# Patient Record
Sex: MALE | Race: WHITE | NOT HISPANIC OR LATINO | Employment: FULL TIME | URBAN - METROPOLITAN AREA
[De-identification: names, ages, dates, MRNs, and addresses within clinical notes are randomized per-mention and may not be internally consistent; named-entity substitution may affect disease eponyms.]

---

## 2018-04-10 ENCOUNTER — OFFICE VISIT (OUTPATIENT)
Dept: FAMILY MEDICINE CLINIC | Facility: CLINIC | Age: 28
End: 2018-04-10
Payer: COMMERCIAL

## 2018-04-10 VITALS
BODY MASS INDEX: 42.66 KG/M2 | WEIGHT: 315 LBS | TEMPERATURE: 98.4 F | HEART RATE: 72 BPM | HEIGHT: 72 IN | SYSTOLIC BLOOD PRESSURE: 112 MMHG | DIASTOLIC BLOOD PRESSURE: 50 MMHG

## 2018-04-10 DIAGNOSIS — M54.42 ACUTE BILATERAL LOW BACK PAIN WITH BILATERAL SCIATICA: Primary | ICD-10-CM

## 2018-04-10 DIAGNOSIS — M54.41 ACUTE BILATERAL LOW BACK PAIN WITH BILATERAL SCIATICA: Primary | ICD-10-CM

## 2018-04-10 PROCEDURE — 99202 OFFICE O/P NEW SF 15 MIN: CPT | Performed by: FAMILY MEDICINE

## 2018-04-10 PROCEDURE — 3008F BODY MASS INDEX DOCD: CPT | Performed by: FAMILY MEDICINE

## 2018-04-10 RX ORDER — NAPROXEN SODIUM 220 MG
440 TABLET ORAL 2 TIMES DAILY WITH MEALS
Qty: 120 TABLET | Refills: 0
Start: 2018-04-10 | End: 2020-09-02 | Stop reason: ALTCHOICE

## 2018-04-10 NOTE — PATIENT INSTRUCTIONS
Problem List Items Addressed This Visit     None      Visit Diagnoses     Acute bilateral low back pain with bilateral sciatica    -  Primary    Aleve 2 twice a day, ice, heat,  range-of-motion exercises      Relevant Medications    naproxen sodium (ALEVE) 220 MG tablet

## 2018-04-10 NOTE — PROGRESS NOTES
Assessment/Plan:    No problem-specific Assessment & Plan notes found for this encounter  Diagnoses and all orders for this visit:    Acute bilateral low back pain with bilateral sciatica  Comments:  Aleve 2 twice a day, ice, heat,  range-of-motion exercises  Orders:  -     naproxen sodium (ALEVE) 220 MG tablet; Take 2 tablets (440 mg total) by mouth 2 (two) times a day with meals for 30 days          Subjective:      Patient ID: Therese Aguilar is a 24 y o  male  CC:  Low back pain, onset over the past weekend  Denies radiation at present but states he did have some tingling sensation in (r) arm and both legs yesterday  Used Advil and Lidocaine cream for short, temporary relief  Patient had significant onset of back pain  He was trying to rest through this, use some Advil  That gave him minimal amount of relief  Overall, however, it continues to be a problem  Patient is moving in the near future, so he was packing boxes, as well as moving furniture around  After that, he did happen to sleep on the couch as well  Denies any specific trauma or injury that he can recall  He has been doing stretching for this since the beginning of the pain  Patient was having significant amount of radiating pain from the back into the hips  This was after he had been sitting for a little bit  When he went to walk around he was noticing that increased amount of pain  The following portions of the patient's history were reviewed and updated as appropriate: allergies, current medications, past family history, past medical history, past social history, past surgical history and problem list     Review of Systems   Constitutional: Negative  HENT: Negative  Eyes: Negative  Respiratory: Negative  Cardiovascular: Negative  Gastrointestinal: Negative  Endocrine: Negative  Genitourinary: Negative  Musculoskeletal:        Per HPI   Skin: Negative  Allergic/Immunologic: Negative  Neurological: Negative  Hematological: Negative  Psychiatric/Behavioral: Negative  Objective:  Vitals:    04/10/18 0850   BP: 112/50   BP Location: Left arm   Patient Position: Sitting   Cuff Size: Large   Pulse: 72   Temp: 98 4 °F (36 9 °C)   TempSrc: Tympanic   Weight: (!) 151 kg (333 lb 12 8 oz)   Height: 6' (1 829 m)       /50 (BP Location: Left arm, Patient Position: Sitting, Cuff Size: Large)   Pulse 72   Temp 98 4 °F (36 9 °C) (Tympanic)   Ht 6' (1 829 m)   Wt (!) 151 kg (333 lb 12 8 oz)   BMI 45 27 kg/m²          Physical Exam   Constitutional: He appears well-developed and well-nourished  HENT:   Head: Normocephalic and atraumatic  Neck: Normal range of motion  Neck supple  Cardiovascular: Normal rate, regular rhythm, normal heart sounds and intact distal pulses  Exam reveals no gallop and no friction rub  No murmur heard  Pulmonary/Chest: Effort normal and breath sounds normal  He has no wheezes  He has no rales  Musculoskeletal:        Lumbar back: He exhibits decreased range of motion (Decreased flexion, extension is normal, side bending normal )  He exhibits no tenderness, no bony tenderness, no swelling, no edema and no deformity

## 2019-04-02 ENCOUNTER — OFFICE VISIT (OUTPATIENT)
Dept: URGENT CARE | Age: 29
End: 2019-04-02
Payer: COMMERCIAL

## 2019-04-02 VITALS
TEMPERATURE: 98.1 F | RESPIRATION RATE: 17 BRPM | BODY MASS INDEX: 41.99 KG/M2 | WEIGHT: 310 LBS | HEIGHT: 72 IN | HEART RATE: 74 BPM | SYSTOLIC BLOOD PRESSURE: 140 MMHG | DIASTOLIC BLOOD PRESSURE: 65 MMHG | OXYGEN SATURATION: 98 %

## 2019-04-02 DIAGNOSIS — T78.40XA ALLERGIC REACTION, INITIAL ENCOUNTER: Primary | ICD-10-CM

## 2019-04-02 PROCEDURE — 99213 OFFICE O/P EST LOW 20 MIN: CPT | Performed by: FAMILY MEDICINE

## 2019-04-02 RX ORDER — METHYLPREDNISOLONE 4 MG/1
TABLET ORAL
Qty: 21 TABLET | Refills: 0 | Status: SHIPPED | OUTPATIENT
Start: 2019-04-02 | End: 2020-09-02 | Stop reason: ALTCHOICE

## 2020-09-02 ENCOUNTER — OFFICE VISIT (OUTPATIENT)
Dept: INTERNAL MEDICINE CLINIC | Age: 30
End: 2020-09-02
Payer: COMMERCIAL

## 2020-09-02 VITALS
HEIGHT: 71 IN | BODY MASS INDEX: 44.1 KG/M2 | WEIGHT: 315 LBS | SYSTOLIC BLOOD PRESSURE: 114 MMHG | DIASTOLIC BLOOD PRESSURE: 60 MMHG | HEART RATE: 63 BPM | OXYGEN SATURATION: 97 % | TEMPERATURE: 98.3 F

## 2020-09-02 DIAGNOSIS — E66.01 MORBID OBESITY (HCC): ICD-10-CM

## 2020-09-02 DIAGNOSIS — G89.29 CHRONIC MIDLINE LOW BACK PAIN WITH BILATERAL SCIATICA: ICD-10-CM

## 2020-09-02 DIAGNOSIS — M54.42 CHRONIC MIDLINE LOW BACK PAIN WITH BILATERAL SCIATICA: ICD-10-CM

## 2020-09-02 DIAGNOSIS — F41.1 GENERALIZED ANXIETY DISORDER: ICD-10-CM

## 2020-09-02 DIAGNOSIS — F17.290 OTHER TOBACCO PRODUCT NICOTINE DEPENDENCE, UNCOMPLICATED: ICD-10-CM

## 2020-09-02 DIAGNOSIS — Z76.89 ENCOUNTER TO ESTABLISH CARE: Primary | ICD-10-CM

## 2020-09-02 DIAGNOSIS — M54.41 CHRONIC MIDLINE LOW BACK PAIN WITH BILATERAL SCIATICA: ICD-10-CM

## 2020-09-02 DIAGNOSIS — Z00.00 ANNUAL PHYSICAL EXAM: ICD-10-CM

## 2020-09-02 DIAGNOSIS — F41.0 PANIC ATTACKS: ICD-10-CM

## 2020-09-02 DIAGNOSIS — Z82.69 FAMILY HISTORY OF ANKYLOSING SPONDYLITIS: ICD-10-CM

## 2020-09-02 DIAGNOSIS — M62.830 MUSCLE SPASM OF BACK: ICD-10-CM

## 2020-09-02 PROBLEM — F17.200 NICOTINE DEPENDENCE: Status: ACTIVE | Noted: 2020-09-02

## 2020-09-02 PROBLEM — Z82.49 FAMILY HISTORY OF PREMATURE CORONARY ARTERY DISEASE: Status: ACTIVE | Noted: 2020-09-02

## 2020-09-02 PROCEDURE — 3725F SCREEN DEPRESSION PERFORMED: CPT | Performed by: INTERNAL MEDICINE

## 2020-09-02 PROCEDURE — 99385 PREV VISIT NEW AGE 18-39: CPT | Performed by: INTERNAL MEDICINE

## 2020-09-02 PROCEDURE — 99406 BEHAV CHNG SMOKING 3-10 MIN: CPT | Performed by: INTERNAL MEDICINE

## 2020-09-02 PROCEDURE — 99203 OFFICE O/P NEW LOW 30 MIN: CPT | Performed by: INTERNAL MEDICINE

## 2020-09-02 RX ORDER — CYCLOBENZAPRINE HCL 5 MG
5 TABLET ORAL
Qty: 20 TABLET | Refills: 0 | Status: SHIPPED | OUTPATIENT
Start: 2020-09-02 | End: 2020-09-23 | Stop reason: SDUPTHER

## 2020-09-02 RX ORDER — NAPROXEN 500 MG/1
500 TABLET ORAL 2 TIMES DAILY WITH MEALS
Qty: 30 TABLET | Refills: 1 | Status: SHIPPED | OUTPATIENT
Start: 2020-09-02 | End: 2020-09-23 | Stop reason: SDUPTHER
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
## 2020-09-02 NOTE — PATIENT INSTRUCTIONS
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
## 2020-09-05 ENCOUNTER — APPOINTMENT (OUTPATIENT)
Dept: RADIOLOGY | Age: 30
End: 2020-09-05
Payer: COMMERCIAL

## 2020-09-05 DIAGNOSIS — G89.29 CHRONIC MIDLINE LOW BACK PAIN WITH BILATERAL SCIATICA: ICD-10-CM

## 2020-09-05 DIAGNOSIS — Z82.69 FAMILY HISTORY OF ANKYLOSING SPONDYLITIS: ICD-10-CM

## 2020-09-05 DIAGNOSIS — M54.41 CHRONIC MIDLINE LOW BACK PAIN WITH BILATERAL SCIATICA: ICD-10-CM

## 2020-09-05 DIAGNOSIS — M54.42 CHRONIC MIDLINE LOW BACK PAIN WITH BILATERAL SCIATICA: ICD-10-CM

## 2020-09-05 PROCEDURE — 72110 X-RAY EXAM L-2 SPINE 4/>VWS: CPT

## 2020-09-05 PROCEDURE — 72220 X-RAY EXAM SACRUM TAILBONE: CPT

## 2020-09-16 ENCOUNTER — APPOINTMENT (OUTPATIENT)
Dept: LAB | Age: 30
End: 2020-09-16
Payer: COMMERCIAL

## 2020-09-16 DIAGNOSIS — M54.42 CHRONIC MIDLINE LOW BACK PAIN WITH BILATERAL SCIATICA: ICD-10-CM

## 2020-09-16 DIAGNOSIS — G89.29 CHRONIC MIDLINE LOW BACK PAIN WITH BILATERAL SCIATICA: ICD-10-CM

## 2020-09-16 DIAGNOSIS — Z82.69 FAMILY HISTORY OF ANKYLOSING SPONDYLITIS: ICD-10-CM

## 2020-09-16 DIAGNOSIS — Z00.00 ANNUAL PHYSICAL EXAM: ICD-10-CM

## 2020-09-16 DIAGNOSIS — M54.41 CHRONIC MIDLINE LOW BACK PAIN WITH BILATERAL SCIATICA: ICD-10-CM

## 2020-09-16 LAB
ALBUMIN SERPL BCP-MCNC: 4.3 G/DL (ref 3.5–5)
ALP SERPL-CCNC: 57 U/L (ref 46–116)
ALT SERPL W P-5'-P-CCNC: 38 U/L (ref 12–78)
ANION GAP SERPL CALCULATED.3IONS-SCNC: 6 MMOL/L (ref 4–13)
AST SERPL W P-5'-P-CCNC: 23 U/L (ref 5–45)
BASOPHILS # BLD AUTO: 0.02 THOUSANDS/ΜL (ref 0–0.1)
BASOPHILS NFR BLD AUTO: 0 % (ref 0–1)
BILIRUB SERPL-MCNC: 0.95 MG/DL (ref 0.2–1)
BUN SERPL-MCNC: 18 MG/DL (ref 5–25)
CALCIUM SERPL-MCNC: 9 MG/DL (ref 8.3–10.1)
CHLORIDE SERPL-SCNC: 108 MMOL/L (ref 100–108)
CHOLEST SERPL-MCNC: 137 MG/DL (ref 50–200)
CO2 SERPL-SCNC: 26 MMOL/L (ref 21–32)
CREAT SERPL-MCNC: 0.82 MG/DL (ref 0.6–1.3)
EOSINOPHIL # BLD AUTO: 0.09 THOUSAND/ΜL (ref 0–0.61)
EOSINOPHIL NFR BLD AUTO: 1 % (ref 0–6)
ERYTHROCYTE [DISTWIDTH] IN BLOOD BY AUTOMATED COUNT: 12.5 % (ref 11.6–15.1)
GFR SERPL CREATININE-BSD FRML MDRD: 121 ML/MIN/1.73SQ M
GLUCOSE P FAST SERPL-MCNC: 95 MG/DL (ref 65–99)
HCT VFR BLD AUTO: 45.8 % (ref 36.5–49.3)
HDLC SERPL-MCNC: 29 MG/DL
HGB BLD-MCNC: 15.3 G/DL (ref 12–17)
IMM GRANULOCYTES # BLD AUTO: 0.02 THOUSAND/UL (ref 0–0.2)
IMM GRANULOCYTES NFR BLD AUTO: 0 % (ref 0–2)
LDLC SERPL CALC-MCNC: 72 MG/DL (ref 0–100)
LYMPHOCYTES # BLD AUTO: 2.21 THOUSANDS/ΜL (ref 0.6–4.47)
LYMPHOCYTES NFR BLD AUTO: 25 % (ref 14–44)
MCH RBC QN AUTO: 30.1 PG (ref 26.8–34.3)
MCHC RBC AUTO-ENTMCNC: 33.4 G/DL (ref 31.4–37.4)
MCV RBC AUTO: 90 FL (ref 82–98)
MONOCYTES # BLD AUTO: 0.7 THOUSAND/ΜL (ref 0.17–1.22)
MONOCYTES NFR BLD AUTO: 8 % (ref 4–12)
NEUTROPHILS # BLD AUTO: 5.7 THOUSANDS/ΜL (ref 1.85–7.62)
NEUTS SEG NFR BLD AUTO: 66 % (ref 43–75)
NONHDLC SERPL-MCNC: 108 MG/DL
NRBC BLD AUTO-RTO: 0 /100 WBCS
PLATELET # BLD AUTO: 248 THOUSANDS/UL (ref 149–390)
PMV BLD AUTO: 11.4 FL (ref 8.9–12.7)
POTASSIUM SERPL-SCNC: 4.2 MMOL/L (ref 3.5–5.3)
PROT SERPL-MCNC: 7.3 G/DL (ref 6.4–8.2)
RBC # BLD AUTO: 5.08 MILLION/UL (ref 3.88–5.62)
SODIUM SERPL-SCNC: 140 MMOL/L (ref 136–145)
TRIGL SERPL-MCNC: 181 MG/DL
TSH SERPL DL<=0.05 MIU/L-ACNC: 1.42 UIU/ML (ref 0.36–3.74)
WBC # BLD AUTO: 8.74 THOUSAND/UL (ref 4.31–10.16)

## 2020-09-16 PROCEDURE — 81374 HLA I TYPING 1 ANTIGEN LR: CPT

## 2020-09-16 PROCEDURE — 36415 COLL VENOUS BLD VENIPUNCTURE: CPT

## 2020-09-16 PROCEDURE — 84443 ASSAY THYROID STIM HORMONE: CPT

## 2020-09-16 PROCEDURE — 85025 COMPLETE CBC W/AUTO DIFF WBC: CPT

## 2020-09-16 PROCEDURE — 80053 COMPREHEN METABOLIC PANEL: CPT

## 2020-09-16 PROCEDURE — 80061 LIPID PANEL: CPT

## 2020-09-22 LAB — HLA-B27 QL NAA+PROBE: POSITIVE

## 2020-09-23 ENCOUNTER — OFFICE VISIT (OUTPATIENT)
Dept: INTERNAL MEDICINE CLINIC | Age: 30
End: 2020-09-23
Payer: COMMERCIAL

## 2020-09-23 VITALS
DIASTOLIC BLOOD PRESSURE: 70 MMHG | BODY MASS INDEX: 43.2 KG/M2 | OXYGEN SATURATION: 98 % | HEIGHT: 71 IN | SYSTOLIC BLOOD PRESSURE: 118 MMHG | HEART RATE: 74 BPM | TEMPERATURE: 98.5 F | WEIGHT: 308.6 LBS

## 2020-09-23 DIAGNOSIS — F41.1 GENERALIZED ANXIETY DISORDER: ICD-10-CM

## 2020-09-23 DIAGNOSIS — F17.290 OTHER TOBACCO PRODUCT NICOTINE DEPENDENCE, UNCOMPLICATED: ICD-10-CM

## 2020-09-23 DIAGNOSIS — Z23 NEED FOR IMMUNIZATION AGAINST INFLUENZA: ICD-10-CM

## 2020-09-23 DIAGNOSIS — M45.9 ANKYLOSING SPONDYLITIS, UNSPECIFIED SITE OF SPINE (HCC): Primary | ICD-10-CM

## 2020-09-23 DIAGNOSIS — E78.5 DYSLIPIDEMIA: ICD-10-CM

## 2020-09-23 DIAGNOSIS — M54.42 CHRONIC MIDLINE LOW BACK PAIN WITH BILATERAL SCIATICA: ICD-10-CM

## 2020-09-23 DIAGNOSIS — G89.29 CHRONIC MIDLINE LOW BACK PAIN WITH BILATERAL SCIATICA: ICD-10-CM

## 2020-09-23 DIAGNOSIS — M62.830 MUSCLE SPASM OF BACK: ICD-10-CM

## 2020-09-23 DIAGNOSIS — M54.41 CHRONIC MIDLINE LOW BACK PAIN WITH BILATERAL SCIATICA: ICD-10-CM

## 2020-09-23 PROBLEM — Z76.89 ENCOUNTER TO ESTABLISH CARE: Status: RESOLVED | Noted: 2020-09-02 | Resolved: 2020-09-23

## 2020-09-23 PROBLEM — Z00.00 ANNUAL PHYSICAL EXAM: Status: RESOLVED | Noted: 2020-09-02 | Resolved: 2020-09-23

## 2020-09-23 PROCEDURE — 4004F PT TOBACCO SCREEN RCVD TLK: CPT | Performed by: INTERNAL MEDICINE

## 2020-09-23 PROCEDURE — 99214 OFFICE O/P EST MOD 30 MIN: CPT | Performed by: INTERNAL MEDICINE

## 2020-09-23 PROCEDURE — 90686 IIV4 VACC NO PRSV 0.5 ML IM: CPT

## 2020-09-23 PROCEDURE — 90471 IMMUNIZATION ADMIN: CPT

## 2020-09-23 RX ORDER — NAPROXEN 500 MG/1
500 TABLET ORAL 2 TIMES DAILY WITH MEALS
Qty: 60 TABLET | Refills: 1 | Status: SHIPPED | OUTPATIENT
Start: 2020-09-23 | End: 2020-11-23 | Stop reason: SDUPTHER

## 2020-09-23 RX ORDER — CYCLOBENZAPRINE HCL 5 MG
5 TABLET ORAL 3 TIMES DAILY
Qty: 90 TABLET | Refills: 1 | Status: SHIPPED | OUTPATIENT
Start: 2020-09-23 | End: 2020-11-23 | Stop reason: SDUPTHER

## 2020-09-23 NOTE — PROGRESS NOTES
Assessment/Plan:    Ankylosing spondylitis, none radiographic  -patient has positive HLA B27 antigen, positive family history for ankylosing spondylitis, good response to NSAIDs, asymmetric arthritis and chronic low back pain  -x-ray does not show sacroiliitis  -we will order C-reactive protein and ESR  -we will refer him to Rheumatology    Chronic low back pain  -improved on naproxen and Flexeril with positive gelling phenomenon  -will refill naproxen and Flexeril  -patient has been counseled to take the Flexeril 3 times a day since it does not cause him to have dizziness  He was counseled to start at 5 mg 3 times a day and then increase to 10 mg 3 times a day as tolerated  Nicotine dependence  -I have counseled patient on the importance of smoking cessation  -he said that he has tried to cut down but that it will be difficult to quit because his job entails him trying out cigars  Dyslipidemia  -recent labs done on September 16th, 2020 show low HDL of 29 and elevated triglycerides at 181 with normal total cholesterol of 137 and normal LDL of 72  -patient has been counseled to cut down on his carbohydrates and fats and to avoid eating late at night  -he was counseled to increase his exercise regimen    Generalized anxiety disorder  -patient is still not interested in pharmacotherapy at this time  -our office will try and call the psychology office to get him an earlier appointment  -he was counseled to call the office if his symptoms become overwhelming  -follow-up in 4 months or sooner as needed    All lab results were reviewed with patient  Patient wanted advice on the possibility of using medical marijuana for his pain  I explained to him that with regards to medical marijuana, there have not been enough studies to delineate all the long-term side effects especially in a young person    I acknowledged that medical marijuana does help with pain but we do not know much about interactions with other medications and we do not know about save doses also  However, if he wants a referral to a palliative care specialist or someone else with a license to prescribe marijuana, I will give him the referral but he declined at this time  He wants to think more about it  Diagnoses and all orders for this visit:    Ankylosing spondylitis, unspecified site of spine [facility])  -     Ambulatory referral to Rheumatology; Future  -     Sedimentation rate, automated; Future  -     C-reactive protein; Future    Other tobacco product nicotine dependence, uncomplicated    Dyslipidemia    Generalized anxiety disorder    Chronic midline low back pain with bilateral sciatica  -     cyclobenzaprine (FLEXERIL) 5 mg tablet; Take 1 tablet (5 mg total) by mouth 3 (three) times a day  -     naproxen (NAPROSYN) 500 mg tablet; Take 1 tablet (500 mg total) by mouth 2 (two) times a day with meals    Muscle spasm of back  -     cyclobenzaprine (FLEXERIL) 5 mg tablet; Take 1 tablet (5 mg total) by mouth 3 (three) times a day          Subjective:      Patient ID: Vikki Almanza is a 34 y o  male  HPI  Patient presents for follow-up visit regarding his chronic low back pain  He was started on naproxen and Flexeril at his last visit and states that the low back pain is well controlled but he now has thoracic back pain radiating to the back of his neck with muscle spasms  He states that his symptoms are much improved when he wakes up in the morning and of note he was taking the Flexeril at nighttime  He denies any dizziness with the Flexeril, even with 10 mg  He states that his symptoms are much worse when he sits at work for about 9 hours and gets up  He denies any more sciatica and denies heel pain  He denies any eye pain or change in vision but states that he recently saw his ophthalmologist who told him that he his astigmatism shifted a little bit  He still smokes cigars but states that he has cut down since his last visit    He currently smokes twice a week  He states that it is difficult for him to quit because he works in a [employer]  He admits that he eats late at night and does not exercise much but states that he has started exercising more and admits that he feels much better when he exercises  He has lost a few lb since the last visit  With regards to his anxiety, he states that he has not been able to get an appointment with Psychology at all  He has been calling for 2 weeks and nobody has returned his call  He admits to anxiety but denies panic attacks, feelings of depression, fever, chills, night sweats, chest pain, shortness of breath, palpitations, cough, nausea, vomiting abdominal pain, diarrhea, constipation, dark tarry stools, heartburn, rectal bleeding, dysuria, urinary frequency  The following portions of the patient's history were reviewed and updated as appropriate:   He  has a past medical history of Acute torn meniscus of knee, unspecified laterality, initial encounter and Panic attack  He   Patient Active Problem List    Diagnosis Date Noted    Ankylosing spondylitis ([place] [address] ) 09/23/2020    Dyslipidemia 09/23/2020    Morbid obesity ([place] [address] ) 09/02/2020    Family history of ankylosing spondylitis 09/02/2020    Chronic low back pain with bilateral sciatica 09/02/2020    Generalized anxiety disorder 09/02/2020    Panic attacks 09/02/2020    Nicotine dependence-cigars 09/02/2020    Family history of premature coronary artery disease 09/02/2020     He  has a past surgical history that includes Adenoidectomy  His family history includes Alcohol abuse in his father; Ankylosing spondylitis in his father;  Anxiety disorder in his mother; Bipolar disorder in his father; Cancer in his paternal grandfather; Coronary artery disease in his maternal grandfather and maternal grandmother; Depression in his father, mother, and sister; Diabetes in his maternal grandmother; Drug abuse in his father; Heart attack in his maternal grandfather and maternal grandmother; Hypertension in his maternal grandfather and maternal grandmother; Substance Abuse in his father and mother  He  reports that he has been smoking cigars  He has smoked for the past 7 00 years  He has never used smokeless tobacco  He reports current alcohol use  He reports current drug use  Drug: Marijuana  Current Outpatient Medications   Medication Sig Dispense Refill    cyclobenzaprine (FLEXERIL) 5 mg tablet Take 1 tablet (5 mg total) by mouth 3 (three) times a day 90 tablet 1    naproxen (NAPROSYN) 500 mg tablet Take 1 tablet (500 mg total) by mouth 2 (two) times a day with meals 60 tablet 1     No current facility-administered medications for this visit  Current Outpatient Medications on File Prior to Visit   Medication Sig    [DISCONTINUED] cyclobenzaprine (FLEXERIL) 5 mg tablet Take 1 tablet (5 mg total) by mouth daily at bedtime    [DISCONTINUED] naproxen (NAPROSYN) 500 mg tablet Take 1 tablet (500 mg total) by mouth 2 (two) times a day with meals     No current facility-administered medications on file prior to visit  He is allergic to codeine and penicillins       Review of Systems   Constitutional: Negative for activity change, chills, fatigue, fever and unexpected weight change  HENT: Negative for ear pain, postnasal drip, rhinorrhea, sinus pressure and sore throat  Eyes: Negative for pain and visual disturbance  Respiratory: Negative for cough, choking, chest tightness, shortness of breath and wheezing  Cardiovascular: Negative for chest pain, palpitations and leg swelling  Gastrointestinal: Negative for abdominal pain, constipation, diarrhea, nausea and vomiting  Genitourinary: Negative for dysuria and hematuria  Musculoskeletal: Positive for back pain (better when he wakes up in the morning - he takes the flexeril at night time ), neck pain and neck stiffness   Negative for arthralgias, gait problem, joint swelling and myalgias  Skin: Negative for pallor and rash  Neurological: Negative for dizziness, tremors, seizures, syncope, light-headedness and headaches  Hematological: Negative for adenopathy  Psychiatric/Behavioral: Negative for behavioral problems  The patient is nervous/anxious  Objective:      /70 (BP Location: Left arm, Patient Position: Sitting, Cuff Size: Standard)   Pulse 74   Temp 98 5 °F (36 9 °C) (Temporal)   Ht 5' 11 46" (1 815 m)   Wt (!) 140 kg (308 lb 9 6 oz)   SpO2 98%   BMI 42 49 kg/m²          Physical Exam  Constitutional:       General: He is not in acute distress  Appearance: He is well-developed  He is not diaphoretic  HENT:      Head: Normocephalic and atraumatic  Right Ear: External ear normal       Left Ear: External ear normal       Nose: Nose normal       Mouth/Throat:      Mouth: Mucous membranes are dry  Pharynx: No oropharyngeal exudate  Comments: Dry mucous membranes  Eyes:      General: No scleral icterus  Right eye: No discharge  Left eye: No discharge  Conjunctiva/sclera: Conjunctivae normal       Pupils: Pupils are equal, round, and reactive to light  Neck:      Musculoskeletal: Normal range of motion and neck supple  Muscular tenderness (Posterior neck tenderness on deep palpation with mild paraspinal muscle hypertonicity) present  Thyroid: No thyromegaly  Vascular: No JVD  Trachea: No tracheal deviation  Cardiovascular:      Rate and Rhythm: Normal rate and regular rhythm  Heart sounds: Normal heart sounds  No murmur  No friction rub  No gallop  Pulmonary:      Effort: Pulmonary effort is normal  No respiratory distress  Breath sounds: Normal breath sounds  No wheezing or rales  Chest:      Chest wall: No tenderness  Abdominal:      General: Bowel sounds are normal  There is no distension  Palpations: Abdomen is soft  There is no mass  Tenderness:  There is no abdominal tenderness  There is no guarding or rebound  Musculoskeletal: Normal range of motion  General: No tenderness or deformity  Lumbar back: He exhibits no tenderness (No tenderness and lumbar and sacral region palpation)  Lymphadenopathy:      Cervical: No cervical adenopathy  Skin:     General: Skin is warm and dry  Coloration: Skin is not pale  Findings: No erythema or rash  Neurological:      Mental Status: He is alert and oriented to person, place, and time  Cranial Nerves: No cranial nerve deficit  Motor: No abnormal muscle tone  Coordination: Coordination normal       Deep Tendon Reflexes: Reflexes are normal and symmetric  Psychiatric:         Behavior: Behavior normal            Appointment on 09/16/2020   Component Date Value Ref Range Status    HLA B27 09/16/2020 Positive   Final    HLA-B*27 Positive  This patient is positive for HLA-B*27  This procedure rules  out the B*27:06 and 27:09 alleles, which the literature  suggests are not associated with spondyloarthropathies  B27 allele interpretation for all loci based on IMGT/HLA  database version 3 38  This test was developed and its performance characteristics  determined by [facility]   It has not been cleared or approved  by the Food and Drug Administration  [facility] CLIA ID Number [id number]  This test was performed using PCR (Polymerase Chain Reaction)/SSOP  (Sequence Specific Oligonucleotide Probes) technique  SBT (Sequence  Based Typing) and/or SSP (Sequence Specific Primers) may be used as  supplemental methods when necessary  Please contact [facility] Customer  Service at [phone] if you have any questions     Director of [facility]   Dr Marito Tariq, PhD    WBC 09/16/2020 8 74  4 31 - 10 16 Thousand/uL Final    RBC 09/16/2020 5 08  3 88 - 5 62 Million/uL Final    Hemoglobin 09/16/2020 15 3  12 0 - 17 0 g/dL Final    Hematocrit 09/16/2020 45 8  36 5 - 49 3 % Final    MCV 09/16/2020 90 82 - 98 fL Final    MCH 09/16/2020 30 1  26 8 - 34 3 pg Final    MCHC 09/16/2020 33 4  31 4 - 37 4 g/dL Final    RDW 09/16/2020 12 5  11 6 - 15 1 % Final    MPV 09/16/2020 11 4  8 9 - 12 7 fL Final    Platelets 44/21/0588 248  149 - 390 Thousands/uL Final    nRBC 09/16/2020 0  /100 WBCs Final    Neutrophils Relative 09/16/2020 66  43 - 75 % Final    Immat GRANS % 09/16/2020 0  0 - 2 % Final    Lymphocytes Relative 09/16/2020 25  14 - 44 % Final    Monocytes Relative 09/16/2020 8  4 - 12 % Final    Eosinophils Relative 09/16/2020 1  0 - 6 % Final    Basophils Relative 09/16/2020 0  0 - 1 % Final    Neutrophils Absolute 09/16/2020 5 70  1 85 - 7 62 Thousands/µL Final    Immature Grans Absolute 09/16/2020 0 02  0 00 - 0 20 Thousand/uL Final    Lymphocytes Absolute 09/16/2020 2 21  0 60 - 4 47 Thousands/µL Final    Monocytes Absolute 09/16/2020 0 70  0 17 - 1 22 Thousand/µL Final    Eosinophils Absolute 09/16/2020 0 09  0 00 - 0 61 Thousand/µL Final    Basophils Absolute 09/16/2020 0 02  0 00 - 0 10 Thousands/µL Final    Sodium 09/16/2020 140  136 - 145 mmol/L Final    Potassium 09/16/2020 4 2  3 5 - 5 3 mmol/L Final    Chloride 09/16/2020 108  100 - 108 mmol/L Final    CO2 09/16/2020 26  21 - 32 mmol/L Final    ANION GAP 09/16/2020 6  4 - 13 mmol/L Final    BUN 09/16/2020 18  5 - 25 mg/dL Final    Creatinine 09/16/2020 0 82  0 60 - 1 30 mg/dL Final    Standardized to IDMS reference method    Glucose, Fasting 09/16/2020 95  65 - 99 mg/dL Final    Specimen collection should occur prior to Sulfasalazine administration due to the potential for falsely depressed results  Specimen collection should occur prior to Sulfapyridine administration due to the potential for falsely elevated results      Calcium 09/16/2020 9 0  8 3 - 10 1 mg/dL Final    AST 09/16/2020 23  5 - 45 U/L Final    Specimen collection should occur prior to Sulfasalazine administration due to the potential for falsely depressed results   ALT 09/16/2020 38  12 - 78 U/L Final    Specimen collection should occur prior to Sulfasalazine and/or Sulfapyridine administration due to the potential for falsely depressed results   Alkaline Phosphatase 09/16/2020 57  46 - 116 U/L Final    Total Protein 09/16/2020 7 3  6 4 - 8 2 g/dL Final    Albumin 09/16/2020 4 3  3 5 - 5 0 g/dL Final    Total Bilirubin 09/16/2020 0 95  0 20 - 1 00 mg/dL Final    Use of this assay is not recommended for patients undergoing treatment with eltrombopag due to the potential for falsely elevated results   eGFR 09/16/2020 121  ml/min/1 73sq m Final    Cholesterol 09/16/2020 137  50 - 200 mg/dL Final    Cholesterol:       Desirable         <200 mg/dl       Borderline         200-239 mg/dl       High              >239           Triglycerides 09/16/2020 181* <=150 mg/dL Final    Triglyceride:     Normal          <150 mg/dl     Borderline High 150-199 mg/dl     High            200-499 mg/dl        Very High       >499 mg/dl    Specimen collection should occur prior to N-Acetylcysteine or Metamizole administration due to the potential for falsely depressed results   HDL, Direct 09/16/2020 29* >=40 mg/dL Final    HDL Cholesterol:       Low     <41 mg/dL  Specimen collection should occur prior to Metamizole administration due to the potential for falsley depressed results   LDL Calculated 09/16/2020 72  0 - 100 mg/dL Final    LDL Cholesterol:     Optimal           <100 mg/dl     Near Optimal      100-129 mg/dl     Above Optimal       Borderline High 130-159 mg/dl       High            160-189 mg/dl       Very High       >189 mg/dl         This screening LDL is a calculated result  It does not have the accuracy of the Direct Measured LDL in the monitoring of patients with hyperlipidemia and/or statin therapy  Direct Measure LDL (AZW935) must be ordered separately in these patients      Non-HDL-Chol (CHOL-HDL) 09/16/2020 108  mg/dl Final    TSH 3RD FLEX 09/16/2020 1 420  0 358 - 3 740 uIU/mL Final    Using supplements with high doses of biotin 20 to more than 300 times greater than the adequate daily intake for adults of 30 mcg/day as established by the [place] [employer], can cause falsely depress results

## 2020-10-22 ENCOUNTER — TELEPHONE (OUTPATIENT)
Dept: PSYCHIATRY | Facility: CLINIC | Age: 30
End: 2020-10-22

## 2020-11-20 DIAGNOSIS — M54.42 CHRONIC MIDLINE LOW BACK PAIN WITH BILATERAL SCIATICA: ICD-10-CM

## 2020-11-20 DIAGNOSIS — M54.41 CHRONIC MIDLINE LOW BACK PAIN WITH BILATERAL SCIATICA: ICD-10-CM

## 2020-11-20 DIAGNOSIS — M62.830 MUSCLE SPASM OF BACK: ICD-10-CM

## 2020-11-20 DIAGNOSIS — G89.29 CHRONIC MIDLINE LOW BACK PAIN WITH BILATERAL SCIATICA: ICD-10-CM

## 2020-11-23 RX ORDER — NAPROXEN 500 MG/1
500 TABLET ORAL 2 TIMES DAILY WITH MEALS
Qty: 60 TABLET | Refills: 1 | Status: SHIPPED | OUTPATIENT
Start: 2020-11-23 | End: 2021-01-04 | Stop reason: SDUPTHER

## 2020-11-23 RX ORDER — CYCLOBENZAPRINE HCL 5 MG
5 TABLET ORAL 3 TIMES DAILY
Qty: 90 TABLET | Refills: 1 | Status: SHIPPED | OUTPATIENT
Start: 2020-11-23 | End: 2021-01-04 | Stop reason: SDUPTHER

## 2021-01-04 DIAGNOSIS — M54.41 CHRONIC MIDLINE LOW BACK PAIN WITH BILATERAL SCIATICA: ICD-10-CM

## 2021-01-04 DIAGNOSIS — G89.29 CHRONIC MIDLINE LOW BACK PAIN WITH BILATERAL SCIATICA: ICD-10-CM

## 2021-01-04 DIAGNOSIS — M62.830 MUSCLE SPASM OF BACK: ICD-10-CM

## 2021-01-04 DIAGNOSIS — M54.42 CHRONIC MIDLINE LOW BACK PAIN WITH BILATERAL SCIATICA: ICD-10-CM

## 2021-01-04 RX ORDER — NAPROXEN 500 MG/1
500 TABLET ORAL 2 TIMES DAILY WITH MEALS
Qty: 60 TABLET | Refills: 1 | Status: SHIPPED | OUTPATIENT
Start: 2021-01-04 | End: 2021-05-05 | Stop reason: SDUPTHER

## 2021-01-04 RX ORDER — CYCLOBENZAPRINE HCL 5 MG
5 TABLET ORAL 3 TIMES DAILY
Qty: 90 TABLET | Refills: 1 | Status: SHIPPED | OUTPATIENT
Start: 2021-01-04 | End: 2021-03-24 | Stop reason: ALTCHOICE

## 2021-01-12 ENCOUNTER — TELEPHONE (OUTPATIENT)
Dept: OBGYN CLINIC | Facility: HOSPITAL | Age: 31
End: 2021-01-12

## 2021-01-12 NOTE — TELEPHONE ENCOUNTER
Patient is calling to find out if Dr Matheus Zheng would like him to complete and labs prior to his appointment on 01-26-21  Please advise

## 2021-01-24 NOTE — PROGRESS NOTES
Assessment and Plan:   Mr Loretta Kocher is a 33-year-old male with no significant past medical history, who presents for further evaluation of chronic low back pain and a positive HLA B27 antigen  He is referred by Dr Eleanor Rodgers for a rheumatology consult  Elizabeth Few presents today for further evaluation of chronic low back pain which appears representative of inflammatory back pain and responsive to NSAID use with additional work up showing a positive HLA B27 antigen  His diagnosis is likely consistent with ankylosing spondylitis and I advised him that NSAIDs may be the first-line option for treatment so he should continue with the naproxen at 500 mg twice a day for now  He can minimize the muscle relaxant use  I would also like to obtain an MRI of his sacroiliac joints to assess for any inflammatory changes  - In view of the thoracic spinal discomfort/stiffness this may be representative of an inflammatory process as well and to further evaluate I will obtain an x-ray of his thoracic spine  Of note this region has not responded to NSAID use  - I would like to see him back in the office once the labs, x-rays and MRI are complete  At that time we will discuss if he continues with NSAID use or if we should consider stepping up to TNF inhibitor therapy  Plan:  Diagnoses and all orders for this visit:    Chronic low back pain, unspecified back pain laterality, unspecified whether sciatica present    Ankylosing spondylitis, unspecified site of spine ([address] )  -     MRI pelvis sacrum,coccyx, si jts wo contrast; Future  -     Chronic Hepatitis Panel; Future  -     C-reactive protein; Future  -     Sedimentation rate, automated; Future  -     RF Screen w/ Reflex to Titer; Future  -     Cyclic citrul peptide antibody, IgG;  Future  -     Ambulatory referral to Rheumatology  -     XR spine thoracic 3 vw; Future    HLA B27 (HLA B27 positive)  -     MRI pelvis sacrum,coccyx, si jts wo contrast; Future    Family history of ankylosing spondylitis    Screening-pulmonary TB  -     Quantiferon TB Gold Plus; Future      I have personally reviewed pertinent films in PACS of the SI joint XR which is normal        Activities as tolerated  Exercise: try to maintain a low impact exercise regimen as much as possible  Walk for 30 minutes a day for at least 3 days a week  Continue other medications as prescribed by PCP and other specialists  RTC in 8 weeks  HPI  Mr Hailey Oscar is a 82-year-old male with no significant past medical history, who presents for further evaluation of chronic low back pain and a positive HLA B27 antigen  He is referred by Dr Ciaar Brantley for a rheumatology consult  Patient reports he has experienced intermittent low back pain for a few years, but noticed progressive worsening of his symptoms in August 2020  He was seen by his primary care physician and due to a family history of ankylosing spondylitis in his father had workup done which showed the positive HLA B27 antigen  A CBC, CMP and TSH were normal   An x-ray of his lumbar spine and sacroiliac joints was essentially unrevealing except for minimal lumbar degenerative changes noted  He was started on naproxen 500 mg twice a day and Flexeril 5 mg 3 times a day in September 2020 which has significantly helped with the low back pain and he has not been experiencing it since then  He then started to experience a stiffness/tightening (as opposed to pain) in his midback region which has been constant and does not improve with the naproxen or Flexeril  He does perform daily stretching exercises which relieves his symptoms to some degree  He reports occasional pain on the outer aspect of his right hip just below his belt line  He denies any joint pains of his hands, wrists, elbows, shoulders, neck, currently of his low back/buttocks region, left hip, knees, ankles or feet  He denies swollen joints    He does experience morning stiffness which affects his entire back and takes about 1 hour to improve but states before starting the NSAIDs the morning stiffness was longer  The low back pain does not wake him up from sleep at night and he reports symptoms improving with activities and worsening with rest   Prior to starting the naproxen and Flexeril he was not taking any over-the-counter pain medications and has not been on any other NSAIDs  He denies fevers, unintentional weight loss, dry eyes, dry mouth, inflammatory eye disease, skin rash, psoriasis or inflammatory bowel disease  Other than the ankylosing spondylitis in his father he denies a family history of autoimmune conditions  The following portions of the patient's history were reviewed and updated as appropriate: allergies, current medications, past family history, past medical history, past social history, past surgical history and problem list       Review of Systems  Constitutional: Negative for weight change, fevers, chills, night sweats, fatigue  ENT/Mouth: Negative for hearing changes, ear pain, nasal congestion, sinus pain, hoarseness, sore throat, rhinorrhea, swallowing difficulty  Eyes: Negative for pain, redness, discharge, vision changes  Cardiovascular: Negative for chest pain, SOB, palpitations  Respiratory: Negative for cough, sputum, wheezing, dyspnea  Gastrointestinal: Negative for nausea, vomiting, diarrhea, constipation, pain, heartburn  Genitourinary: Negative for dysuria, urinary frequency, hematuria  Musculoskeletal: As per HPI  Skin: Negative for skin rash, color changes  Neuro: Negative for weakness, numbness, tingling, loss of consciousness  Psych: Negative for anxiety, depression  Heme/Lymph: Negative for easy bruising, bleeding, lymphadenopathy          Past Medical History:   Diagnosis Date    Acute torn meniscus of knee, unspecified laterality, initial encounter     Panic attack        Past Surgical History:   Procedure Laterality Date    ADENOIDECTOMY Social History     Socioeconomic History    Marital status: Single     Spouse name: Not on file    Number of children: Not on file    Years of education: Not on file    Highest education level: Not on file   Occupational History    Not on file   Social Needs    Financial resource strain: Not on file    Food insecurity     Worry: Not on file     Inability: Not on file    Transportation needs     Medical: Not on file     Non-medical: Not on file   Tobacco Use    Smoking status: Current Some Day Smoker     Years: 7 00     Types: Cigars    Smokeless tobacco: Never Used    Tobacco comment: smokes cigars--about 5 per week/Never smokere   Substance and Sexual Activity    Alcohol use: Yes     Frequency: 2-3 times a week     Drinks per session: 1 or 2     Binge frequency: Never     Comment: 2-3 drinks per week    Drug use: Yes     Types: Marijuana    Sexual activity: Not on file   Lifestyle    Physical activity     Days per week: Not on file     Minutes per session: Not on file    Stress: Not on file   Relationships    Social connections     Talks on phone: Not on file     Gets together: Not on file     Attends Hinduism service: Not on file     Active member of club or organization: Not on file     Attends meetings of clubs or organizations: Not on file     Relationship status: Not on file    Intimate partner violence     Fear of current or ex partner: Not on file     Emotionally abused: Not on file     Physically abused: Not on file     Forced sexual activity: Not on file   Other Topics Concern    Not on file   Social History Narrative    Not on file       Family History   Problem Relation Age of Onset    Substance Abuse Mother     Depression Mother     Anxiety disorder Mother     Substance Abuse Father     Ankylosing spondylitis Father     Bipolar disorder Father     Alcohol abuse Father     Depression Father     Drug abuse Father     Cancer Paternal Grandfather     Depression Sister  Heart attack Maternal Grandmother     Coronary artery disease Maternal Grandmother     Diabetes Maternal Grandmother     Hypertension Maternal Grandmother     Heart attack Maternal Grandfather     Coronary artery disease Maternal Grandfather     Hypertension Maternal Grandfather        Allergies   Allergen Reactions    Codeine Other (See Comments)     Unknown    Penicillins Other (See Comments)     Unknown       Current Outpatient Medications:     cyclobenzaprine (FLEXERIL) 5 mg tablet, Take 1 tablet (5 mg total) by mouth 3 (three) times a day, Disp: 90 tablet, Rfl: 1    naproxen (NAPROSYN) 500 mg tablet, Take 1 tablet (500 mg total) by mouth 2 (two) times a day with meals, Disp: 60 tablet, Rfl: 1      Objective:    Vitals:    01/26/21 0907   BP: 142/88   Pulse: 87   Weight: (!) 140 kg (308 lb)       Physical Exam  General: Well appearing, well nourished, in no distress  Oriented x 3, normal mood and affect  Ambulating without difficulty  Overweight  Skin: Good turgor, no rash, unusual bruising or prominent lesions  Hair: Normal texture and distribution  Nails: Normal color, no deformities  He does appear to have a few digital pits noted at the right hand 2nd digit and left hand 4th digit  HEENT:  Head: Normocephalic, atraumatic  Eyes: Conjunctiva clear, sclera non-icteric, EOM intact  Extremities: No amputations or deformities, cyanosis, edema  Musculoskeletal:   There is no peripheral joint soft tissue swelling or tenderness noted today  There is no enthesitis or dactylitis  There is no specific spinal tenderness or sacroiliac joint tenderness noted  Normal Schober test   Neurologic: Alert and oriented  No focal neurological deficits appreciated  Psychiatric: Normal mood and affect  NBA Massey    Rheumatology

## 2021-01-26 ENCOUNTER — CONSULT (OUTPATIENT)
Dept: RHEUMATOLOGY | Facility: CLINIC | Age: 31
End: 2021-01-26
Payer: COMMERCIAL

## 2021-01-26 VITALS
DIASTOLIC BLOOD PRESSURE: 88 MMHG | WEIGHT: 308 LBS | SYSTOLIC BLOOD PRESSURE: 142 MMHG | HEART RATE: 87 BPM | BODY MASS INDEX: 42.41 KG/M2

## 2021-01-26 DIAGNOSIS — Z82.69 FAMILY HISTORY OF ANKYLOSING SPONDYLITIS: ICD-10-CM

## 2021-01-26 DIAGNOSIS — G89.29 CHRONIC LOW BACK PAIN, UNSPECIFIED BACK PAIN LATERALITY, UNSPECIFIED WHETHER SCIATICA PRESENT: Primary | ICD-10-CM

## 2021-01-26 DIAGNOSIS — M45.9 ANKYLOSING SPONDYLITIS, UNSPECIFIED SITE OF SPINE (HCC): ICD-10-CM

## 2021-01-26 DIAGNOSIS — Z15.89 HLA B27 (HLA B27 POSITIVE): ICD-10-CM

## 2021-01-26 DIAGNOSIS — M54.50 CHRONIC LOW BACK PAIN, UNSPECIFIED BACK PAIN LATERALITY, UNSPECIFIED WHETHER SCIATICA PRESENT: Primary | ICD-10-CM

## 2021-01-26 DIAGNOSIS — Z11.1 SCREENING-PULMONARY TB: ICD-10-CM

## 2021-01-26 PROCEDURE — 4004F PT TOBACCO SCREEN RCVD TLK: CPT | Performed by: INTERNAL MEDICINE

## 2021-01-26 PROCEDURE — 99205 OFFICE O/P NEW HI 60 MIN: CPT | Performed by: INTERNAL MEDICINE

## 2021-01-27 ENCOUNTER — TELEPHONE (OUTPATIENT)
Dept: OBGYN CLINIC | Facility: HOSPITAL | Age: 31
End: 2021-01-27

## 2021-01-27 NOTE — TELEPHONE ENCOUNTER
Please keep the scheduled appointment for now and once I receive all the results we will contact him, thanks

## 2021-02-03 ENCOUNTER — TELEPHONE (OUTPATIENT)
Dept: INTERNAL MEDICINE CLINIC | Age: 31
End: 2021-02-03

## 2021-02-03 NOTE — TELEPHONE ENCOUNTER
Patient sees Dr Micheline Carrasquillo    Patient called to schedule his 8 week follow up  Patient was able to schedule his xray for this upcoming Friday, 1/29  Patient would like to know if he should be seen sooner or if the doctor would still prefer to wait the full 8 weeks  Patient is currently schedule for GARTH Reveles's soonest appointment, 5/5      Call back # [phone] [facility]: I discussed with the patient the risks of risankizumab-rzaa including but not limited to immunosuppression, and serious infections.  The patient understands that monitoring is required including a PPD at baseline and must alert us or the primary physician if symptoms of infection or other concerning signs are noted.

## 2021-02-05 ENCOUNTER — HOSPITAL ENCOUNTER (OUTPATIENT)
Dept: RADIOLOGY | Age: 31
Discharge: HOME | End: 2021-02-05
Payer: COMMERCIAL

## 2021-02-05 DIAGNOSIS — M45.9 ANKYLOSING SPONDYLITIS, UNSPECIFIED SITE OF SPINE (HCC): ICD-10-CM

## 2021-02-05 DIAGNOSIS — Z15.89 HLA B27 (HLA B27 POSITIVE): ICD-10-CM

## 2021-02-05 PROCEDURE — G1004 CDSM NDSC: HCPCS

## 2021-02-05 PROCEDURE — 72195 MRI PELVIS W/O DYE: CPT

## 2021-02-15 ENCOUNTER — APPOINTMENT (OUTPATIENT)
Dept: RADIOLOGY | Age: 31
End: 2021-02-15
Payer: COMMERCIAL

## 2021-02-15 ENCOUNTER — LAB (OUTPATIENT)
Dept: LAB | Age: 31
End: 2021-02-15
Payer: COMMERCIAL

## 2021-02-15 DIAGNOSIS — Z11.1 SCREENING-PULMONARY TB: ICD-10-CM

## 2021-02-15 DIAGNOSIS — M45.9 ANKYLOSING SPONDYLITIS, UNSPECIFIED SITE OF SPINE (HCC): ICD-10-CM

## 2021-02-15 LAB
CRP SERPL QL: <3 MG/L
ERYTHROCYTE [SEDIMENTATION RATE] IN BLOOD: 10 MM/HOUR (ref 0–14)

## 2021-02-15 PROCEDURE — 86140 C-REACTIVE PROTEIN: CPT

## 2021-02-15 PROCEDURE — 86704 HEP B CORE ANTIBODY TOTAL: CPT

## 2021-02-15 PROCEDURE — 86480 TB TEST CELL IMMUN MEASURE: CPT

## 2021-02-15 PROCEDURE — 85652 RBC SED RATE AUTOMATED: CPT

## 2021-02-15 PROCEDURE — 72072 X-RAY EXAM THORAC SPINE 3VWS: CPT

## 2021-02-15 PROCEDURE — 86803 HEPATITIS C AB TEST: CPT

## 2021-02-15 PROCEDURE — 87340 HEPATITIS B SURFACE AG IA: CPT

## 2021-02-15 PROCEDURE — 36415 COLL VENOUS BLD VENIPUNCTURE: CPT

## 2021-02-15 PROCEDURE — 86430 RHEUMATOID FACTOR TEST QUAL: CPT

## 2021-02-15 PROCEDURE — 86200 CCP ANTIBODY: CPT

## 2021-02-15 PROCEDURE — 86705 HEP B CORE ANTIBODY IGM: CPT

## 2021-02-16 LAB
HBV CORE AB SER QL: NORMAL
HBV CORE IGM SER QL: NORMAL
HBV SURFACE AG SER QL: NORMAL
HCV AB SER QL: NORMAL
RHEUMATOID FACT SER QL LA: NEGATIVE

## 2021-02-16 NOTE — PROGRESS NOTES
Assessment and Plan:   Mr Stacey Whelan is a 29-year-old male with no significant past medical history, who presents for follow up of chronic low back pain and a positive HLA B27 antigen  # Ankylosing spondylitis  - Tucker Matias presents today for follow up of inflammatory back pain with significant response to NSAIDs and a positive HLA B27 antigen which overall appears to be consistent with a diagnosis of ankylosing spondylitis  It is reassuring to note his inflammatory markers were normal and there are no inflammatory changes seen yet on the MRI of his sacroiliac joints  As the low back pain has improved with NSAID use he will continue with the naproxen 500 mg twice daily as needed, but I discussed with him the concerns related to long term NSAID use and to try and minimize the naproxen dose and use if possible  He is aware if his symptoms do not respond to NSAIDs the next step in treatment would be consideration of a TNF inhibitor  - In terms of the upper and mid back pain I suspect this is more so related to a muscular etiology and not secondary to the ankylosing spondylitis diagnosis as he is not responding to NSAIDs  I recommend a conservative approach for now with physical therapy, chiropractic manipulation or acupuncture  He would like to start off with PT at this time  If this is ineffective we can consider an alternate NSAID  Plan:  Diagnoses and all orders for this visit:    Ankylosing spondylitis, unspecified site of spine ([facility] [address] )    Chronic midline thoracic back pain  -     Ambulatory referral to Physical Therapy; Future    HLA B27 (HLA B27 positive)    NSAID long-term use      Activities as tolerated  Exercise: try to maintain a low impact exercise regimen as much as possible  Walk for 30 minutes a day for at least 3 days a week  Continue other medications as prescribed by PCP and other specialists  RTC in 6 months          HPI    INITIAL VISIT NOTE (1/2021):  Mr Stacey Whelan is a 29-year-old male with no significant past medical history, who presents for further evaluation of chronic low back pain and a positive HLA B27 antigen  He is referred by Dr Sandor David for a rheumatology consult      Patient reports he has experienced intermittent low back pain for a few years, but noticed progressive worsening of his symptoms in August 2020  He was seen by his primary care physician and due to a family history of ankylosing spondylitis in his father had workup done which showed the positive HLA B27 antigen  A CBC, CMP and TSH were normal   An x-ray of his lumbar spine and sacroiliac joints was essentially unrevealing except for minimal lumbar degenerative changes noted  He was started on naproxen 500 mg twice a day and Flexeril 5 mg 3 times a day in September 2020 which has significantly helped with the low back pain and he has not been experiencing it since then  He then started to experience a stiffness/tightening (as opposed to pain) in his midback region which has been constant and does not improve with the naproxen or Flexeril  He does perform daily stretching exercises which relieves his symptoms to some degree  He reports occasional pain on the outer aspect of his right hip just below his belt line  He denies any joint pains of his hands, wrists, elbows, shoulders, neck, currently of his low back/buttocks region, left hip, knees, ankles or feet  He denies swollen joints  He does experience morning stiffness which affects his entire back and takes about 1 hour to improve but states before starting the NSAIDs the morning stiffness was longer    The low back pain does not wake him up from sleep at night and he reports symptoms improving with activities and worsening with rest   Prior to starting the naproxen and Flexeril he was not taking any over-the-counter pain medications and has not been on any other NSAIDs      He denies fevers, unintentional weight loss, dry eyes, dry mouth, inflammatory eye disease, skin rash, psoriasis or inflammatory bowel disease  Other than the ankylosing spondylitis in his father he denies a family history of autoimmune conditions        2/17/2021:  Patient presents for a follow-up today  We reviewed the x-ray of his thoracic spine and MRI of the sacroiliac joints which were both normal   An ESR, CRP, RF and hepatitis panel were normal   A CCP antibody and Quantiferon are still pending  Patient reports the upper and mid back pain is still present and quite severe  He is not getting any relief with the naproxen 500 mg twice daily for Flexeril as needed  The low back pain has not recurred  He does not report any other new complaints today  The following portions of the patient's history were reviewed and updated as appropriate: allergies, current medications, past family history, past medical history, past social history, past surgical history and problem list       Review of Systems  Constitutional: Negative for weight change, fevers, chills, night sweats, fatigue  ENT/Mouth: Negative for hearing changes, ear pain, nasal congestion, sinus pain, hoarseness, sore throat, rhinorrhea, swallowing difficulty  Eyes: Negative for pain, redness, discharge, vision changes  Cardiovascular: Negative for chest pain, SOB, palpitations  Respiratory: Negative for cough, sputum, wheezing, dyspnea  Gastrointestinal: Negative for nausea, vomiting, diarrhea, constipation, pain, heartburn  Genitourinary: Negative for dysuria, urinary frequency, hematuria  Musculoskeletal: As per HPI  Skin: Negative for skin rash, color changes  Neuro: Negative for weakness, numbness, tingling, loss of consciousness  Psych: Negative for anxiety, depression  Heme/Lymph: Negative for easy bruising, bleeding, lymphadenopathy          Past Medical History:   Diagnosis Date    Acute torn meniscus of knee, unspecified laterality, initial encounter     Panic attack        Past Surgical History: Procedure Laterality Date    ADENOIDECTOMY         Social History     Socioeconomic History    Marital status: Single     Spouse name: Not on file    Number of children: Not on file    Years of education: Not on file    Highest education level: Not on file   Occupational History    Not on file   Social Needs    Financial resource strain: Not on file    Food insecurity     Worry: Not on file     Inability: Not on file    Transportation needs     Medical: Not on file     Non-medical: Not on file   Tobacco Use    Smoking status: Current Some Day Smoker     Years: 7 00     Types: Cigars    Smokeless tobacco: Never Used    Tobacco comment: smokes cigars--about 5 per week/Never smokere   Substance and Sexual Activity    Alcohol use: Yes     Frequency: 2-3 times a week     Drinks per session: 1 or 2     Binge frequency: Never     Comment: 2-3 drinks per week    Drug use: Yes     Types: Marijuana    Sexual activity: Not on file   Lifestyle    Physical activity     Days per week: Not on file     Minutes per session: Not on file    Stress: Not on file   Relationships    Social connections     Talks on phone: Not on file     Gets together: Not on file     Attends Confucianism service: Not on file     Active member of club or organization: Not on file     Attends meetings of clubs or organizations: Not on file     Relationship status: Not on file    Intimate partner violence     Fear of current or ex partner: Not on file     Emotionally abused: Not on file     Physically abused: Not on file     Forced sexual activity: Not on file   Other Topics Concern    Not on file   Social History Narrative    Not on file       Family History   Problem Relation Age of Onset    Substance Abuse Mother     Depression Mother     Anxiety disorder Mother     Substance Abuse Father     Ankylosing spondylitis Father     Bipolar disorder Father     Alcohol abuse Father     Depression Father     Drug abuse Father    Lujan Cancer Paternal Grandfather     Depression Sister     Heart attack Maternal Grandmother     Coronary artery disease Maternal Grandmother     Diabetes Maternal Grandmother     Hypertension Maternal Grandmother     Heart attack Maternal Grandfather     Coronary artery disease Maternal Grandfather     Hypertension Maternal Grandfather        Allergies   Allergen Reactions    Codeine Other (See Comments)     Unknown    Penicillins Other (See Comments)     Unknown       Current Outpatient Medications:     cyclobenzaprine (FLEXERIL) 5 mg tablet, Take 1 tablet (5 mg total) by mouth 3 (three) times a day, Disp: 90 tablet, Rfl: 1    naproxen (NAPROSYN) 500 mg tablet, Take 1 tablet (500 mg total) by mouth 2 (two) times a day with meals, Disp: 60 tablet, Rfl: 1      Objective:    Vitals:    02/17/21 0837   BP: 142/84   Pulse: (!) 109       Physical Exam  General: Well appearing, well nourished, in no distress  Oriented x 3, normal mood and affect  Ambulating without difficulty  Skin: Good turgor, no rash, unusual bruising or prominent lesions  Hair: Normal texture and distribution  Nails: Normal color, no deformities  HEENT:  Head: Normocephalic, atraumatic  Eyes: Conjunctiva clear, sclera non-icteric, EOM intact  Extremities: No amputations or deformities, cyanosis, edema  Neurologic: Alert and oriented  No focal neurological deficits appreciated  Psychiatric: Normal mood and affect  Romayne Dunker, M D    Rheumatology

## 2021-02-17 ENCOUNTER — OFFICE VISIT (OUTPATIENT)
Dept: RHEUMATOLOGY | Facility: CLINIC | Age: 31
End: 2021-02-17
Payer: COMMERCIAL

## 2021-02-17 VITALS — HEART RATE: 109 BPM | SYSTOLIC BLOOD PRESSURE: 142 MMHG | DIASTOLIC BLOOD PRESSURE: 84 MMHG

## 2021-02-17 DIAGNOSIS — Z15.89 HLA B27 (HLA B27 POSITIVE): ICD-10-CM

## 2021-02-17 DIAGNOSIS — M54.6 CHRONIC MIDLINE THORACIC BACK PAIN: ICD-10-CM

## 2021-02-17 DIAGNOSIS — M45.9 ANKYLOSING SPONDYLITIS, UNSPECIFIED SITE OF SPINE (HCC): Primary | ICD-10-CM

## 2021-02-17 DIAGNOSIS — G89.29 CHRONIC MIDLINE THORACIC BACK PAIN: ICD-10-CM

## 2021-02-17 DIAGNOSIS — Z79.1 NSAID LONG-TERM USE: ICD-10-CM

## 2021-02-17 LAB
CCP IGA+IGG SERPL IA-ACNC: 6 UNITS (ref 0–19)
GAMMA INTERFERON BACKGROUND BLD IA-ACNC: 0.02 IU/ML
M TB IFN-G BLD-IMP: NEGATIVE
M TB IFN-G CD4+ BCKGRND COR BLD-ACNC: -0.01 IU/ML
M TB IFN-G CD4+ BCKGRND COR BLD-ACNC: 0 IU/ML
MITOGEN IGNF BCKGRD COR BLD-ACNC: >10 IU/ML

## 2021-02-17 PROCEDURE — 99214 OFFICE O/P EST MOD 30 MIN: CPT | Performed by: INTERNAL MEDICINE

## 2021-02-23 ENCOUNTER — EVALUATION (OUTPATIENT)
Dept: PHYSICAL THERAPY | Age: 31
End: 2021-02-23
Payer: COMMERCIAL

## 2021-02-23 DIAGNOSIS — M45.9 ANKYLOSING SPONDYLITIS, UNSPECIFIED SITE OF SPINE (HCC): Primary | ICD-10-CM

## 2021-02-23 DIAGNOSIS — G89.29 CHRONIC MIDLINE THORACIC BACK PAIN: ICD-10-CM

## 2021-02-23 DIAGNOSIS — M54.6 CHRONIC MIDLINE THORACIC BACK PAIN: ICD-10-CM

## 2021-02-23 PROCEDURE — 97163 PT EVAL HIGH COMPLEX 45 MIN: CPT | Performed by: PHYSICAL THERAPIST

## 2021-02-23 NOTE — PROGRESS NOTES
PT Evaluation     Today's date: 2021  Patient name: Julian Maharaj IV  : 1990  MRN: [id number]  Referring provider: Mary Zapien MD  Dx:   Encounter Diagnosis     ICD-10-CM    1  Ankylosing spondylitis, unspecified site of spine ([facility] [address] )  M45 9    2  Chronic midline thoracic back pain  M54 6 Ambulatory referral to Physical Therapy    G89 29                   Assessment  Assessment details: Patient seen for PT evaluation for AS  Patient presents with shoulder pain at rest and previous low back pain which has improved since IE  Patient presents with decreased LE strength, decreased UE strength, tightness and pain in cervical spine and lumbar spine  Patient presents with moderate postural deficits, constantly re-adjusting, using arms with elbows locked out to support upper body  Feels less shoulder and back pain with corrective upright posture, finds himself constantly shifting at 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 to address his pain  Patient is active, concerned for recent dx of progressive disorder  PT established HEP for gentle stretches, initiated light strengthening program and will progress during therapy sessions  Impairments: abnormal or restricted ROM, activity intolerance, impaired physical strength, lacks appropriate home exercise program, pain with function, scapular dyskinesis, poor posture  and poor body mechanics  Functional limitations: Patient reports shoulder and back pain with - prolonged sitting, with work duties, with IADLs, with lifting, with reaching and with recreational activities  Understanding of Dx/Px/POC: good   Prognosis: good    Goals  Impairment Goals to be met within 4 weeks    - Decrease pain to 0/10 at rest and 2/10 at worst  - Improve cervical ROM by 5-10 degrees lateral flexion  - Improve lumbar ROM by 5-10 degrees lateral flexion  - Increase strength to 4/5 throughout  - Improve core strength and stability  - Patient to be able to sustain seated posture without support x 1 min     Functional Goals to be met within 4-6 weeks  - Return to Prior Level of Function  - Increase Functional Status Measure to: expected  - Patient will be independent with HEP  - Patient to be able to tolerate work duties without increase in shoulder pain         Plan  Patient would benefit from: skilled physical therapy  Planned modality interventions: cryotherapy and thermotherapy: hydrocollator packs  Planned therapy interventions: abdominal trunk stabilization, manual therapy, neuromuscular re-education, patient education, postural training, strengthening, stretching, therapeutic activities, therapeutic exercise, home exercise program, gait training, body mechanics training and balance  Frequency: 2x week  Duration in weeks: 6  Treatment plan discussed with: patient        Subjective Evaluation    History of Present Illness  Mechanism of injury: Patient recently diagnosed with ankylosing spondylitis  Patient's father has ankylosing spondylitis  Patient has always had back and leg pain off and on for years  Patient reports that his pain started to worsen and started to have tingling in his hands and feet  Patient followed up with his PCP and was recommended to Rheumatology  Patient started taking OTC NSAIDs for his pain  Patient has since followed up with rheumatology, MRIs taken revealed AS, but well controlled right now  Patient's primary pain is the R scapula area, and feels his pain radiates outward when its severe  Patient reports that his low back is feeling better, is taking the muscle relaxor regularly and the Naproxen  Reports the Naproxen is helping with his inflammation with the AS       Pain  Current pain rating: 3  At best pain ratin  At worst pain ratin  Quality: tight, sharp and knife-like  Aggravating factors: sitting, standing, walking, stair climbing and lifting  Progression: no change    Social Support  Steps to enter house: yes  Stairs in house: yes   Lives in: multiple-level home    Employment status: working        Objective     Concurrent Complaints  Positive for disturbed sleep   Negative for night pain, dizziness, faints, headaches, nausea/motion sickness, tinnitus, trouble swallowing, difficulty breathing and shortness of breath    Postural Observations  Seated posture: fair  Standing posture: fair  Correction of posture: has no consistent effect        Neurological Testing     Sensation   Cervical/Thoracic   Left   Intact: light touch    Right   Intact: light touch    Active Range of Motion   Cervical/Thoracic Spine       Cervical    Flexion: 50 degrees   Extension: 48 degrees      Left lateral flexion: 35 degrees     with pain  Right lateral flexion: 30 degrees      Left rotation: 60 degrees  Right rotation: 64 degrees         Left Shoulder   Normal active range of motion    Right Shoulder   Normal active range of motion    Lumbar   Flexion: 75 degrees   Extension: 38 degrees   Left lateral flexion: 40 degrees       Right lateral flexion: 28 degrees     Strength/Myotome Testing     Left Shoulder     Planes of Motion   Flexion: 3+   Extension: 3+   Abduction: 3+   External rotation at 0°: 3+   Internal rotation at 0°: 3+     Right Shoulder     Planes of Motion   Flexion: 3+   Extension: 3+   Abduction: 3+   External rotation at 0°: 3+   Internal rotation at 0°: 3+     Left Hip   Planes of Motion   Flexion: 3+  Abduction: 3+  Adduction: 3+    Right Hip   Planes of Motion   Flexion: 3+  Abduction: 3+  Adduction: 3+    Left Knee   Flexion: 3+  Extension: 3+    Right Knee   Flexion: 3+  Extension: 3+    Left Ankle/Foot   Dorsiflexion: 4  Plantar flexion: 4    Right Ankle/Foot   Dorsiflexion: 4  Plantar flexion: 4    Ambulation     Observational Gait   Gait: within functional limits   Neuro Exam:     Headaches   Patient reports headaches: No        Flowsheet Rows      Most Recent Value   PT/OT G-Codes   Current Score  57   Projected Score  72             Precautions: Ankylosing spondylitis      Manuals                                        Neuro Re-Ed                Side step with TB around ankles                                                Ther Ex                UBE                 Corner pec stretch        Wall push ups                TB rows and extension        TB IR/ER                bridges        SLR flex        SLR abd        SAQ                Leg press        knee extension        Hip abd        Ham curls                TA ball press                kristi core strengthening                                                                        Ther Activity                        Gait Training                        Modalities                        Progress as able

## 2021-02-25 ENCOUNTER — OFFICE VISIT (OUTPATIENT)
Dept: PHYSICAL THERAPY | Age: 31
End: 2021-02-25
Payer: COMMERCIAL

## 2021-02-25 DIAGNOSIS — M45.9 ANKYLOSING SPONDYLITIS, UNSPECIFIED SITE OF SPINE (HCC): ICD-10-CM

## 2021-02-25 DIAGNOSIS — M54.6 CHRONIC MIDLINE THORACIC BACK PAIN: Primary | ICD-10-CM

## 2021-02-25 DIAGNOSIS — G89.29 CHRONIC MIDLINE THORACIC BACK PAIN: Primary | ICD-10-CM

## 2021-02-25 PROCEDURE — 97110 THERAPEUTIC EXERCISES: CPT

## 2021-02-25 NOTE — PROGRESS NOTES
Daily Note     Today's date: 2021  Patient name: Jason Noriega IV  : 1990  MRN: [id number]  Referring provider: Anoop Ha MD  Dx:   Encounter Diagnosis     ICD-10-CM    1  Chronic midline thoracic back pain  M54 6     G89 29    2  Ankylosing spondylitis, unspecified site of spine ([facility] [address] )  M45 9                   Subjective: Pt reports he has been doing his home exercises  Notes main problem areas are L upper back and L shoulder      Objective: See treatment diary below      Assessment: Tolerated treatment well  Spent session implementing home ex with handouts issued  Noted pt did have fatigue especially with proximal hip strengthening  Pt also feels that he wants to focus more ex on upper back so prone progression may be added at some point  Plan: Cont PT     Precautions:  Ankylosing spondylitis      Manuals                                        Neuro Re-Ed                Side step with TB around ankles Red x2 10ft                                               Ther Ex                UBE  8 m 4/4               Corner pec stretch [id number]       Wall push ups 2x10               TB rows and extension Green 2x10       TB IR/ER Green 2x10               bridges 2x10       SLR flex 1 5# 2x10       SLR abd 1 5# 2x10       SAQ 3# 3x10               Leg press # 80 3x10       knee extension        Hip abd        Ham curls # 60 2x10               TA ball press 5sec x20               kristi core strengthening # 10 x10 reps ea side                                                                       Ther Activity                        Gait Training                        Modalities                        Progress as able

## 2021-02-26 DIAGNOSIS — M62.830 MUSCLE SPASM OF BACK: ICD-10-CM

## 2021-02-26 DIAGNOSIS — M54.42 CHRONIC MIDLINE LOW BACK PAIN WITH BILATERAL SCIATICA: ICD-10-CM

## 2021-02-26 DIAGNOSIS — G89.29 CHRONIC MIDLINE LOW BACK PAIN WITH BILATERAL SCIATICA: ICD-10-CM

## 2021-02-26 DIAGNOSIS — M54.41 CHRONIC MIDLINE LOW BACK PAIN WITH BILATERAL SCIATICA: ICD-10-CM

## 2021-02-26 RX ORDER — CYCLOBENZAPRINE HCL 5 MG
5 TABLET ORAL 3 TIMES DAILY
Qty: 90 TABLET | Refills: 0 | Status: CANCELLED | OUTPATIENT
Start: 2021-02-26

## 2021-02-26 NOTE — TELEPHONE ENCOUNTER
LMOM for pt  To call back to schedule follow up  Once appt   Scheduled will send to provider pool to sign off on

## 2021-03-01 ENCOUNTER — OFFICE VISIT (OUTPATIENT)
Dept: PHYSICAL THERAPY | Age: 31
End: 2021-03-01
Payer: COMMERCIAL

## 2021-03-01 DIAGNOSIS — G89.29 CHRONIC MIDLINE THORACIC BACK PAIN: Primary | ICD-10-CM

## 2021-03-01 DIAGNOSIS — M54.6 CHRONIC MIDLINE THORACIC BACK PAIN: Primary | ICD-10-CM

## 2021-03-01 DIAGNOSIS — M45.9 ANKYLOSING SPONDYLITIS, UNSPECIFIED SITE OF SPINE (HCC): ICD-10-CM

## 2021-03-01 PROCEDURE — 97110 THERAPEUTIC EXERCISES: CPT

## 2021-03-01 NOTE — PROGRESS NOTES
Daily Note     Today's date: 3/1/2021  Patient name: Humphrey Shirley IV  : 1990  MRN: [id number]  Referring provider: Yenifer Redd MD  Dx:   Encounter Diagnosis     ICD-10-CM    1  Chronic midline thoracic back pain  M54 6     G89 29    2  Ankylosing spondylitis, unspecified site of spine ([address] )  M45 9                   Subjective:  Notes main problem areas are L upper back and L shoulder and pt not feeling much change thus far      Objective: See treatment diary below      Assessment: Tolerated treatment well  Noted pt did have fatigue especially with proximal hip strengthening and with advances of bridges  Challenged with current  Ex regimen  Pt also feels that he wants to focus more ex on upper back so prone progression may be added at some point  Plan: Cont PT     Precautions:  Ankylosing spondylitis      Manuals 2/25 3/1                                      Neuro Re-Ed                Side step with TB around ankles Red x2 10ft Red x2 10ft x2                                              Ther Ex                UBE  8 m 4/4 8m 4/4              Corner pec stretch [id number]      Wall push ups 2x10 2x10              TB rows and extension Green 2x10 Green 2x10      TB IR/ER Green 2x10 Green 2x10      Bridges with marches  x10      bridges 2x10 2x10      SLR flex 1 5# 2x10 1 5# 2x10      SLR abd 1 5# 2x10 1 5# 2x10      SAQ 3# 3x10 4# 3x10              Leg press # 80 3x10 85# 3x10      knee extension        Hip abd  # 40 3x10      Ham curls # 60 2x10 # 85 3x10              TA ball press 5sec x20 5sec x30              kristi core strengthening # 10 x10 reps ea side # 10 x10 reps ea side                                                                      Ther Activity                        Gait Training                        Modalities                        Progress as able

## 2021-03-04 ENCOUNTER — OFFICE VISIT (OUTPATIENT)
Dept: PHYSICAL THERAPY | Age: 31
End: 2021-03-04
Payer: COMMERCIAL

## 2021-03-04 DIAGNOSIS — M54.6 CHRONIC MIDLINE THORACIC BACK PAIN: Primary | ICD-10-CM

## 2021-03-04 DIAGNOSIS — M45.9 ANKYLOSING SPONDYLITIS, UNSPECIFIED SITE OF SPINE (HCC): ICD-10-CM

## 2021-03-04 DIAGNOSIS — G89.29 CHRONIC MIDLINE THORACIC BACK PAIN: Primary | ICD-10-CM

## 2021-03-04 PROCEDURE — 97110 THERAPEUTIC EXERCISES: CPT

## 2021-03-04 NOTE — PROGRESS NOTES
Daily Note     Today's date: 3/4/2021  Patient name: Mekhi Johnson IV  : 1990  MRN: [id number]  Referring provider: Maury Gosselin, MD  Dx:   Encounter Diagnosis     ICD-10-CM    1  Chronic midline thoracic back pain  M54 6     G89 29    2  Ankylosing spondylitis, unspecified site of spine ([facility] [address] )  M45 9                   Subjective:  Notes main problem areas are L upper back and L shoulder   Pt does note doing some moving and he did not hurt and that suprised him  Objective: See treatment diary below      Assessment: Tolerated treatment well  Noted pt did have fatigue especially with proximal hip strengthening and with advances of bridges  Challenged with new mid back additions as well  Challenged with current  Ex regimen  Pt also feels that he wants to focus more ex on upper back so prone ex was given at home  Plan: Cont PT     Precautions:  Ankylosing spondylitis      Manuals 2/25 3/1 3/4                                     Neuro Re-Ed                Side step with TB around ankles Red x2 10ft Red x2 10ft x2 Red 10ft x8                                             Ther Ex                UBE  8 m  8m  10 m 5/5             Corner pec stretch [id number] [id number] [id number]     Wall push ups 2x10 2x10 2x10             TB rows and extension Green 2x10 Green 2x10 Green 3x10     TB IR/ER Green 2x10 Green 2x10 Green 3x10     Bridges with marches  x10 2x10     bridges 2x10 2x10 2x10     SLR flex 1 5# 2x10 1 5# 2x10 1 5# 3x10     SLR abd 1 5# 2x10 1 5# 2x10 1 5# 2x10     SAQ 3# 3x10 4# 3x10              Leg press # 80 3x10 85# 3x10 100# 3x10     knee extension   # 35 3x10     Hip abd  # 40 3x10 45# 3x10     Ham curls # 60 2x10 # 85 3x10 85# 3x10             TA ball press 5sec x20 5sec x30 5sec x30             kristi core strengthening # 10 x10 reps ea side # 10 x10 reps ea side # 12 x10 reps ea side     Prone @ 45,90,120   1#2x10     Prone rows   2# 2x10 Ther Activity                        Gait Training                        Modalities                        Progress as able

## 2021-03-04 NOTE — TELEPHONE ENCOUNTER
Last O/V: 9/23/20  Next O/V: NONE- lm to call back and make appointment     Medication requested:   cyclobenzaprine (FLEXERIL) 5 mg tablet    Pharmacy:    [facility] [address]da Conn, [address] [facility]

## 2021-03-08 ENCOUNTER — OFFICE VISIT (OUTPATIENT)
Dept: PHYSICAL THERAPY | Age: 31
End: 2021-03-08
Payer: COMMERCIAL

## 2021-03-08 DIAGNOSIS — G89.29 CHRONIC MIDLINE THORACIC BACK PAIN: Primary | ICD-10-CM

## 2021-03-08 DIAGNOSIS — M45.9 ANKYLOSING SPONDYLITIS, UNSPECIFIED SITE OF SPINE (HCC): ICD-10-CM

## 2021-03-08 DIAGNOSIS — M54.6 CHRONIC MIDLINE THORACIC BACK PAIN: Primary | ICD-10-CM

## 2021-03-08 PROCEDURE — 97110 THERAPEUTIC EXERCISES: CPT

## 2021-03-08 NOTE — PROGRESS NOTES
Daily Note     Today's date: 3/8/2021  Patient name: Isrrael King IV  : 1990  MRN: [id number]  Referring provider: Shana Bennett MD  Dx:   Encounter Diagnosis     ICD-10-CM    1  Chronic midline thoracic back pain  M54 6     G89 29    2  Ankylosing spondylitis, unspecified site of spine ([address] )  M45 9                   Subjective:  Pt spent whole weekend working on moving  Pt feels he feels suprisingly good in his back considering all the lifting he did  Pt was pleased  Pt did request a lighter session due to lack of sleep and fatigue      Objective: See treatment diary below      Assessment: Tolerated treatment well  Challenged with current  Ex regimen  Decreased some of his ex intensity today due to moving  Pt with no pain, just fatigue after session  Will resume normal ex intensity as able  Plan: Cont PT     Precautions:  Ankylosing spondylitis      Manuals 2/25 3/1 3/4 3/8                                    Neuro Re-Ed                Side step with TB around ankles Red x2 10ft Red x2 10ft x2 Red 10ft x8 NT                                            Ther Ex                UBE  8 m /4 8m /4 10 m 5/5 10m 5/5            Corner pec stretch [id number] 73imvi2 24sxer0 74csja8    Wall push ups 2x10 2x10 2x10 2x10            TB rows and extension Green 2x10 Green 2x10 Green 3x10 Green 3x10    TB IR/ER Green 2x10 Green 2x10 Green 3x10 Green 3x10    Bridges with marches  x10 2x10 2x10    bridges 2x10 2x10 2x10 2x10    SLR flex 1 5# 2x10 1 5# 2x10 1 5# 3x10 NT    SLR abd 1 5# 2x10 1 5# 2x10 1 5# 2x10 NT    SAQ 3# 3x10 4# 3x10              Leg press # 80 3x10 85# 3x10 100# 3x10 100# 3x10    knee extension   # 35 3x10 35# 3x10    Hip abd  # 40 3x10 45# 3x10 50# 3x10    Ham curls # 60 2x10 # 85 3x10 85# 3x10 85# 3x10            TA ball press 5sec x20 5sec x30 5sec x30 NT            kristi core strengthening # 10 x10 reps ea side # 10 x10 reps ea side # 12 x10 reps ea side 12# x10 ea side    Prone @ 05,19,836 1#2x10 1# 2x10    Prone rows   2# 2x10 2# 2x10                                                    Ther Activity                        Gait Training                        Modalities                        Progress as able

## 2021-03-15 ENCOUNTER — OFFICE VISIT (OUTPATIENT)
Dept: PHYSICAL THERAPY | Age: 31
End: 2021-03-15
Payer: COMMERCIAL

## 2021-03-15 DIAGNOSIS — M45.9 ANKYLOSING SPONDYLITIS, UNSPECIFIED SITE OF SPINE (HCC): ICD-10-CM

## 2021-03-15 DIAGNOSIS — M54.6 CHRONIC MIDLINE THORACIC BACK PAIN: Primary | ICD-10-CM

## 2021-03-15 DIAGNOSIS — G89.29 CHRONIC MIDLINE THORACIC BACK PAIN: Primary | ICD-10-CM

## 2021-03-15 PROCEDURE — 97110 THERAPEUTIC EXERCISES: CPT

## 2021-03-15 NOTE — PROGRESS NOTES
Daily Note     Today's date: 3/15/2021  Patient name: Yolanda Carbajal IV  : 1990  MRN: [id number]  Referring provider: Sherri Garber MD  Dx:   Encounter Diagnosis     ICD-10-CM    1  Chronic midline thoracic back pain  M54 6     G89 29    2  Ankylosing spondylitis, unspecified site of spine ([address] )  M45 9                   Subjective:  Pt reports pain has become less diffuse  " I don't have LBP anymore"  Pt reports pain is concentrated more in R UT and R lat areas      Objective: See treatment diary below  FOTO taken 3/15      Assessment: Tolerated treatment well  Challenged with current  Ex regimen  Pt with no pain, just fatigue after session  Will add UT and possibly lat stretch next visit if pain persists in R side  Plan: Cont PT     Precautions:  Ankylosing spondylitis      Manuals 2/25 3/1 3/4 3/8 3/15                                   Neuro Re-Ed                Side step with TB around ankles Red x2 10ft Red x2 10ft x2 Red 10ft x8 NT Green x5                                           Ther Ex                UBE  8 m 4/4 8m /4 10 m 5/5 10m 5/5 10m 5/5           Corner pec stretch [id number] 43eimk9 23noyt4 31aoxx5 12himo0   Wall push ups 2x10 2x10 2x10 2x10 2x10           TB rows and extension Green 2x10 Green 2x10 Green 3x10 Green 3x10 Green 3x10   TB IR/ER Green 2x10 Green 2x10 Green 3x10 Green 3x10 Green 3x10   Bridges with marches & leg ext  x10 2x10 2x10 ea    bridges 2x10 2x10 2x10 2x10 2x10   SLR flex 1 5# 2x10 1 5# 2x10 1 5# 3x10 NT 2# 3x10   SLR abd 1 5# 2x10 1 5# 2x10 1 5# 2x10 NT 2# 3x10   SAQ 3# 3x10 4# 3x10              Leg press # 80 3x10 85# 3x10 100# 3x10 100# 3x10 110# 30   knee extension   # 35 3x10 35# 3x10 35# 3x10   Hip abd  # 40 3x10 45# 3x10 50# 3x10 55 # 3x10   Ham curls # 60 2x10 # 85 3x10 85# 3x10 85# 3x10 85# 3x10           TA ball press 5sec x20 5sec x30 5sec x30 NT [id number]           kristi core strengthening # 10 x10 reps ea side # 10 x10 reps ea side # 12 x10 reps ea side 12# x10 ea side 12# x10 ea side   Prone @ 48,50,046   1#2x10 1# 2x10 1# 2x10   Prone rows   2# 2x10 2# 2x10 2# 2x10                                                   Ther Activity                        Gait Training                        Modalities                        Progress as able

## 2021-03-18 ENCOUNTER — OFFICE VISIT (OUTPATIENT)
Dept: PHYSICAL THERAPY | Age: 31
End: 2021-03-18
Payer: COMMERCIAL

## 2021-03-18 DIAGNOSIS — G89.29 CHRONIC MIDLINE THORACIC BACK PAIN: Primary | ICD-10-CM

## 2021-03-18 DIAGNOSIS — M45.9 ANKYLOSING SPONDYLITIS, UNSPECIFIED SITE OF SPINE (HCC): ICD-10-CM

## 2021-03-18 DIAGNOSIS — M54.6 CHRONIC MIDLINE THORACIC BACK PAIN: Primary | ICD-10-CM

## 2021-03-18 PROCEDURE — 97110 THERAPEUTIC EXERCISES: CPT

## 2021-03-18 NOTE — PROGRESS NOTES
Daily Note     Today's date: 3/18/2021  Patient name: Nahun Reno IV  : 1990  MRN: [id number]  Referring provider: Zachery Gray MD  Dx:   Encounter Diagnosis     ICD-10-CM    1  Chronic midline thoracic back pain  M54 6     G89 29    2  Ankylosing spondylitis, unspecified site of spine ([address] )  M45 9                   Subjective:  Pt reports pain has become less diffuse  " I don't have LBP anymore"  Pt reports pain is concentrated more in R UT and R lat areas and pt would like to work on those area to help pain      Objective: See treatment diary below  FOTO taken 3/15      Assessment: Tolerated treatment well  Challenged with current  Ex regimen  Pt with no pain, just fatigue after session  Added R UT , scalene and lat stretches to aid with pain c/o's  Pt was told to do stretches twice a day and report back after weekend if pain has improved      Plan: Cont PT     Precautions:  Ankylosing spondylitis      Manuals 3/18 3/1 3/4 3/8 3/15                                   Neuro Re-Ed                Side step with TB around ankles green x2 10ft Red x2 10ft x2 Red 10ft x8 NT Green x5                                           Ther Ex                UBE  10 m 4/4 8m 4/4 10 m 5/5 10m 5/5 10m 5/5           Corner pec stretch [id number] 50uvfp6 76scck5 48yhjz2 92xtls6   Wall push ups 2x10 2x10 2x10 2x10 2x10           TB rows and extension Green 3x10 Green 2x10 Green 3x10 Green 3x10 Green 3x10   TB IR/ER Green 3x10 Green 2x10 Green 3x10 Green 3x10 Green 3x10   Bridges with marches & leg ext  x10 2x10 2x10 ea    bridges 2x10 2x10 2x10 2x10 2x10   SLR flex 2# 3x10 1 5# 2x10 1 5# 3x10 NT 2# 3x10   SLR abd 2# 3x10 1 5# 2x10 1 5# 2x10 NT 2# 3x10   SAQ  4# 3x10              Leg press # 110 3x10 85# 3x10 100# 3x10 100# 3x10 110# 30   knee extension #35 3x10  # 35 3x10 35# 3x10 35# 3x10   Hip abd #55 3x10 # 40 3x10 45# 3x10 50# 3x10 55 # 3x10   Ham curls # 85 2x10 # 85 3x10 85# 3x10 85# 3x10 85# 3x10           TA ball press 5sec x30 5sec x30 5sec x30 NT [id number]           kristi core strengthening # 15 x10 reps ea side # 10 x10 reps ea side # 12 x10 reps ea side 12# x10 ea side 12# x10 ea side   Prone @ 69,57,907 # 2 2x10  1#2x10 1# 2x10 1# 2x10   Prone rows #3 3x10  2# 2x10 2# 2x10 2# 2x10   R UT str   37rgwa4       R scalene stretch 81smuo6       L SB [id number]                               Ther Activity                        Gait Training                        Modalities                        Progress as able
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
## 2021-03-22 ENCOUNTER — OFFICE VISIT (OUTPATIENT)
Dept: PHYSICAL THERAPY | Age: 31
End: 2021-03-22
Payer: COMMERCIAL

## 2021-03-22 DIAGNOSIS — G89.29 CHRONIC MIDLINE THORACIC BACK PAIN: Primary | ICD-10-CM

## 2021-03-22 DIAGNOSIS — M45.9 ANKYLOSING SPONDYLITIS, UNSPECIFIED SITE OF SPINE (HCC): ICD-10-CM

## 2021-03-22 DIAGNOSIS — M54.6 CHRONIC MIDLINE THORACIC BACK PAIN: Primary | ICD-10-CM

## 2021-03-22 PROCEDURE — 97110 THERAPEUTIC EXERCISES: CPT

## 2021-03-22 NOTE — PROGRESS NOTES
Daily Note     Today's date: 3/22/2021  Patient name: Susan Hooper IV  : 1990  MRN: [id number]  Referring provider: Flory Delcid MD  Dx:   Encounter Diagnosis     ICD-10-CM    1  Chronic midline thoracic back pain  M54 6     G89 29    2  Ankylosing spondylitis, unspecified site of spine ([facility] [address] )  M45 9                   Subjective: Reports bad weekend with flare up of pain in mid back  Pt reports he has numbness and tingling in B UE and LE  Pain was a constant 4/10 over weekend and is a constant 2/10 this AM wesley in mid back      Objective: See treatment diary below      Assessment: Tolerated treatment fair  Modified ex to limit weighted ex and worked toward general fatigue to help with associated muscle spasm  Pt was fatigued and felt ex was suprisingly hard despite reduction but did not feel ex aggravated pain at session  Will monitor pt for any delayed pain response  Pt discussed plan with LPT to schedule follow up with MD to address ongoing evolution of pain in back and symptoms in his extremities       Plan: Cont PT per LPT plan     Precautions:  Ankylosing spondylitis      Manuals 3/22 3/1 3/4 3/8 3/15                                   Neuro Re-Ed                Side step with TB around ankles Red x2 10ft-NT Red x2 10ft x2 Red 10ft x8 NT Green x5                                           Ther Ex                UBE  8 m 4/4 8m 4/4 10 m 5/5 10m 5/5 10m 5/5           Corner pec stretch [id number]   Wall push ups 2x10 2x10 2x10 2x10 2x10           TB rows and extension Red 2x10 Green 2x10 Green 3x10 Green 3x10 Green 3x10   TB IR/ER red 2x10 Green 2x10 Green 3x10 Green 3x10 Green 3x10   Bridges with marches & leg ext NT x10 2x10 2x10 ea    bridges 2x10 2x10 2x10 2x10 2x10   SLR flex 1 5# 2x10- NT 1 5# 2x10 1 5# 3x10 NT 2# 3x10   SLR abd 1 5# 2x10- NT 1 5# 2x10 1 5# 2x10 NT 2# 3x10   SAQ 3# 3x10- NT 4# 3x10              Leg press # 100 3x10 85# 3x10 100# 3x10 100# 3x10 110# 30 knee extension NT  # 35 3x10 35# 3x10 35# 3x10   Hip abd # 50  2x10 # 40 3x10 45# 3x10 50# 3x10 55 # 3x10   Ham curls # 852x10 # 85 3x10 85# 3x10 85# 3x10 85# 3x10           TA ball press 5sec x20 5sec x30 5sec x30 NT [id number]           kristi core strengthening # 10 x10 reps ea side- NT # 10 x10 reps ea side # 12 x10 reps ea side 12# x10 ea side 12# x10 ea side   Prone @ 96,70,423 45,90 x15  1#2x10 1# 2x10 1# 2x10   Prone rows x15  2# 2x10 2# 2x10 2# 2x10                                                   Ther Activity                        Gait Training                        Modalities                        Progress as able

## 2021-03-24 ENCOUNTER — OFFICE VISIT (OUTPATIENT)
Dept: INTERNAL MEDICINE CLINIC | Age: 31
End: 2021-03-24
Payer: COMMERCIAL

## 2021-03-24 VITALS
HEART RATE: 76 BPM | BODY MASS INDEX: 44.1 KG/M2 | DIASTOLIC BLOOD PRESSURE: 70 MMHG | SYSTOLIC BLOOD PRESSURE: 102 MMHG | OXYGEN SATURATION: 97 % | TEMPERATURE: 98.4 F | HEIGHT: 71 IN | WEIGHT: 315 LBS

## 2021-03-24 DIAGNOSIS — M45.9 ANKYLOSING SPONDYLITIS, UNSPECIFIED SITE OF SPINE (HCC): ICD-10-CM

## 2021-03-24 DIAGNOSIS — M62.838 MUSCLE SPASM: ICD-10-CM

## 2021-03-24 DIAGNOSIS — M54.9 UPPER BACK PAIN, CHRONIC: ICD-10-CM

## 2021-03-24 DIAGNOSIS — G89.29 CHRONIC MIDLINE LOW BACK PAIN WITH BILATERAL SCIATICA: ICD-10-CM

## 2021-03-24 DIAGNOSIS — M54.42 CHRONIC MIDLINE LOW BACK PAIN WITH BILATERAL SCIATICA: ICD-10-CM

## 2021-03-24 DIAGNOSIS — G89.29 UPPER BACK PAIN, CHRONIC: ICD-10-CM

## 2021-03-24 DIAGNOSIS — M54.41 CHRONIC MIDLINE LOW BACK PAIN WITH BILATERAL SCIATICA: ICD-10-CM

## 2021-03-24 DIAGNOSIS — M54.2 NECK PAIN: Primary | ICD-10-CM

## 2021-03-24 DIAGNOSIS — M54.12 CERVICAL RADICULOPATHY: ICD-10-CM

## 2021-03-24 PROBLEM — G62.9 NEUROPATHY: Status: ACTIVE | Noted: 2021-03-24

## 2021-03-24 PROCEDURE — 4004F PT TOBACCO SCREEN RCVD TLK: CPT | Performed by: INTERNAL MEDICINE

## 2021-03-24 PROCEDURE — 99214 OFFICE O/P EST MOD 30 MIN: CPT | Performed by: INTERNAL MEDICINE

## 2021-03-24 PROCEDURE — 3008F BODY MASS INDEX DOCD: CPT | Performed by: INTERNAL MEDICINE

## 2021-03-24 RX ORDER — CYCLOBENZAPRINE HCL 10 MG
10 TABLET ORAL
Qty: 20 TABLET | Refills: 0 | Status: SHIPPED | OUTPATIENT
Start: 2021-03-24 | End: 2021-04-07 | Stop reason: SDUPTHER

## 2021-03-24 NOTE — PROGRESS NOTES
Assessment/Plan:    Neck pain with muscle spasm and cervical radiculopathy  - will order an x-ray of the neck  -will start patient on cyclobenzaprine at 10 mg at nighttime  - he may also use heat compress  -if cervical x-ray is negative, we will order an MRI of the neck  -follow-up in 2 weeks    Upper back pain  - patient was encouraged to take naproxen as needed as well as Flexeril   - he was counseled to avoid overdoing things    Ankylosing spondylitis  -continue to follow with rheumatology     Chronic low back pain with bilateral sciatica  -Currently resolved     Diagnoses and all orders for this visit:    Neck pain  -     XR spine cervical complete 4 or 5 vw non injury; Future  -     cyclobenzaprine (FLEXERIL) 10 mg tablet; Take 1 tablet (10 mg total) by mouth daily at bedtime    Upper back pain, chronic  -     XR spine cervical complete 4 or 5 vw non injury; Future    Ankylosing spondylitis, unspecified site of spine (HCC)    Cervical radiculopathy  -     XR spine cervical complete 4 or 5 vw non injury; Future  -     cyclobenzaprine (FLEXERIL) 10 mg tablet; Take 1 tablet (10 mg total) by mouth daily at bedtime    Muscle spasm  -     cyclobenzaprine (FLEXERIL) 10 mg tablet; Take 1 tablet (10 mg total) by mouth daily at bedtime    Chronic midline low back pain with bilateral sciatica    BMI 40 0-44 9, adult (HCC)          BMI Counseling: Body mass index is 43 78 kg/m²  The BMI is above normal  Nutrition recommendations include encouraging healthy choices of fruits and vegetables, reducing intake of saturated and trans fat and reducing intake of cholesterol  Exercise recommendations include moderate physical activity 150 minutes/week  No pharmacotherapy was ordered  Patient referred to PCP due to patient being overweight  Subjective:      Patient ID: Ofe Dotson is a 31 y o  male  HPI   Patient presents for a follow-up visit regarding his back pain    He was seen in the office many months ago with lower back pain and was referred to Rheumatology with a suspicion of ankylosing spondylitis and states that he was actually diagnosed with ankylosing spondylitis by the rheumatologist   Fortunately, he states that his lower back pain and hip pain has resolved when he started eating better but unfortunately, patient now has upper back and neck pain  He states that an MRI of the pelvis and sacrum with SI joints was done which did not show any features of ankylosing spondylitis and an x-ray of the thoracic spine was also done which was normal   He was referred to physical therapy and has had PT for 5 weeks without much improvement  He complains that he has pain in his thoracic back which he describes as pressure-like sometimes and also complains of neck pain with numbness radiating down his bilateral hands  He has not yet had an x-ray of the neck done  He states that the Flexeril and naproxen did not help much so he stopped taking them  He also states that the physical therapist suggested an MRI of the neck and thoracic spine  He is not very happy because he believes that providers believe that he has chronic pain and he is concerned because all the tests have not yet been done to discover what could be responsible for his pain  He had initially asked me about medical marijuana but does not want to proceed with that right now  The following portions of the patient's history were reviewed and updated as appropriate:   He  has a past medical history of Acute torn meniscus of knee, unspecified laterality, initial encounter and Panic attack    He   Patient Active Problem List    Diagnosis Date Noted    Upper back pain, chronic 03/24/2021    Neck pain 03/24/2021    Neuropathy 03/24/2021    BMI 40 0-44 9, adult ([facility] ) 03/24/2021    Ankylosing spondylitis ([facility] ) 09/23/2020    Dyslipidemia 09/23/2020    Morbid obesity ([facility] ) 09/02/2020    Family history of ankylosing spondylitis 09/02/2020    Chronic low back pain with bilateral sciatica 09/02/2020    Generalized anxiety disorder 09/02/2020    Panic attacks 09/02/2020    Nicotine dependence-cigars 09/02/2020    Family history of premature coronary artery disease 09/02/2020     He  has a past surgical history that includes Adenoidectomy  His family history includes Alcohol abuse in his father; Ankylosing spondylitis in his father; Anxiety disorder in his mother; Bipolar disorder in his father; Cancer in his paternal grandfather; Coronary artery disease in his maternal grandfather and maternal grandmother; Depression in his father, mother, and sister; Diabetes in his maternal grandmother; Drug abuse in his father; Heart attack in his maternal grandfather and maternal grandmother; Hypertension in his maternal grandfather and maternal grandmother; Substance Abuse in his father and mother  He  reports that he has been smoking cigars  He has smoked for the past 7 00 years  He has never used smokeless tobacco  He reports current alcohol use  He reports current drug use  Drug: Marijuana  Current Outpatient Medications   Medication Sig Dispense Refill    cyclobenzaprine (FLEXERIL) 10 mg tablet Take 1 tablet (10 mg total) by mouth daily at bedtime 20 tablet 0    naproxen (NAPROSYN) 500 mg tablet Take 1 tablet (500 mg total) by mouth 2 (two) times a day with meals (Patient not taking: Reported on 3/24/2021) 60 tablet 1     No current facility-administered medications for this visit  Current Outpatient Medications on File Prior to Visit   Medication Sig    naproxen (NAPROSYN) 500 mg tablet Take 1 tablet (500 mg total) by mouth 2 (two) times a day with meals (Patient not taking: Reported on 3/24/2021)    [DISCONTINUED] cyclobenzaprine (FLEXERIL) 5 mg tablet Take 1 tablet (5 mg total) by mouth 3 (three) times a day (Patient not taking: Reported on 3/24/2021)     No current facility-administered medications on file prior to visit        He is allergic to codeine and penicillins       Review of Systems   Constitutional: Negative for activity change, chills, fatigue, fever and unexpected weight change  HENT: Negative for ear pain, postnasal drip, rhinorrhea, sinus pressure and sore throat  Eyes: Negative for pain  Respiratory: Negative for cough, choking, chest tightness, shortness of breath and wheezing  Cardiovascular: Negative for chest pain, palpitations and leg swelling  Gastrointestinal: Negative for abdominal pain, constipation, diarrhea, nausea and vomiting  Genitourinary: Negative for dysuria and hematuria  Musculoskeletal: Positive for back pain (upper back pain- has been going for PT for the past 5 weeks but it did not help much, has been having pressure and pain in the thoracic back with numbness of the hands b/l) and neck pain  Negative for arthralgias, gait problem, joint swelling, myalgias and neck stiffness  Skin: Negative for pallor and rash  Neurological: Negative for dizziness, tremors, seizures, syncope, light-headedness and headaches  Hematological: Negative for adenopathy  Psychiatric/Behavioral: Negative for behavioral problems  Objective:      /70 (BP Location: Left arm, Patient Position: Sitting, Cuff Size: Large)   Pulse 76   Temp 98 4 °F (36 9 °C) (Temporal)   Ht 5' 11 46" (1 815 m)   Wt (!) 144 kg (318 lb)   SpO2 97%   BMI 43 78 kg/m²          Physical Exam  Constitutional:       General: He is not in acute distress  Appearance: He is well-developed  He is not diaphoretic  HENT:      Head: Normocephalic and atraumatic  Right Ear: External ear normal       Left Ear: External ear normal       Nose: Nose normal       Mouth/Throat:      Mouth: Mucous membranes are dry  Pharynx: No oropharyngeal exudate  Comments:  Dry mucous membranes    Eyes:      General: No scleral icterus  Right eye: No discharge  Left eye: No discharge        Conjunctiva/sclera: Conjunctivae normal  Pupils: Pupils are equal, round, and reactive to light  Neck:      Musculoskeletal: Neck supple  Decreased range of motion  Pain with movement ( pain on movement of the neck especially with rotation to the left, side bending, extension) present  Thyroid: No thyromegaly  Vascular: No JVD  Trachea: No tracheal deviation  Cardiovascular:      Rate and Rhythm: Normal rate and regular rhythm  Heart sounds: Normal heart sounds  No murmur  No friction rub  No gallop  Pulmonary:      Effort: Pulmonary effort is normal  No respiratory distress  Breath sounds: Normal breath sounds  No wheezing or rales  Chest:      Chest wall: No tenderness  Abdominal:      General: Bowel sounds are normal  There is no distension  Palpations: Abdomen is soft  There is no mass  Tenderness: There is no abdominal tenderness  There is no guarding or rebound  Musculoskeletal:         General: No deformity  Thoracic back: He exhibits tenderness ( tenderness in the  lower bilateral thoracic back, close to the lower ribs)  Lymphadenopathy:      Cervical: No cervical adenopathy  Skin:     General: Skin is warm and dry  Coloration: Skin is not pale  Findings: No erythema or rash  Neurological:      Mental Status: He is alert and oriented to person, place, and time  Cranial Nerves: No cranial nerve deficit  Motor: No abnormal muscle tone  Coordination: Coordination normal       Deep Tendon Reflexes: Reflexes are normal and symmetric     Psychiatric:         Behavior: Behavior normal            Lab on 02/15/2021   Component Date Value Ref Range Status    Hepatitis B Surface Ag 02/15/2021 Non-reactive  Non-reactive, NonReactive - Confirmed Final    Hepatitis C Ab 02/15/2021 Non-reactive  Non-reactive Final    Hep B C IgM 02/15/2021 Non-reactive  Non-reactive Final    Hep B Core Total Ab 02/15/2021 Non-reactive  Non-reactive Final    QFT Nil 02/15/2021 0 02  0 - 8 0 IU/ml Final    QFT TB1-NIL 02/15/2021 0 00  IU/ml Final    QFT TB2-NIL 02/15/2021 -0 01  IU/ml Final    QFT Mitogen-NIL 02/15/2021 >10 00  IU/ml Final    QFT Final Interpretation 02/15/2021 Negative  Negative Final    No Interferon-gamma response to M  tuberculosis antigens detected  Infection with M  tuberculosis is unlikely  A single negative result does not exclude infection with M  tuberculosis  In patients at high risk for M  tuberculosis infection, a second test should be considered in accordance with the 2017 ATS/IDSA/CDC Clinical Practice Guidelines for Diagnosis of Tuberculosis in Adults and Children  False negative results can be a result of incorrect blood sample collection or handling of the specimen affecting lymphocyte function   CRP 02/15/2021 <3 0  <3 0 mg/L Final    Sed Rate 02/15/2021 10  0 - 14 mm/hour Final    Rheumatoid Factor 02/15/2021 Negative  Negative Final    Cyclic Citrullin Peptide Ab 02/15/2021 6  0 - 19 units Final                              Negative               <20                            Weak positive      20 - 39                            Moderate positive  40 - 59                            Strong positive        >59   Appointment on 09/16/2020   Component Date Value Ref Range Status    HLA B27 09/16/2020 Positive   Final    HLA-B*27 Positive  This patient is positive for HLA-B*27  This procedure rules  out the B*27:06 and 27:09 alleles, which the literature  suggests are not associated with spondyloarthropathies  B27 allele interpretation for all loci based on IMGT/HLA  database version 3 38  This test was developed and its performance characteristics  determined by [facility]   It has not been cleared or approved  by the Food and Drug Administration  [facility] CLIA ID Number [id number]  This test was performed using PCR (Polymerase Chain Reaction)/SSOP  (Sequence Specific Oligonucleotide Probes) technique    SBT (Sequence  Based Typing) and/or SSP (Sequence Specific Primers) may be used as  supplemental methods when necessary  Please contact [facility] Customer  Service at [phone] if you have any questions     Director of [facility]   Dr Viv Tavarez, PhD    WBC 09/16/2020 8 74  4 31 - 10 16 Thousand/uL Final    RBC 09/16/2020 5 08  3 88 - 5 62 Million/uL Final    Hemoglobin 09/16/2020 15 3  12 0 - 17 0 g/dL Final    Hematocrit 09/16/2020 45 8  36 5 - 49 3 % Final    MCV 09/16/2020 90  82 - 98 fL Final    MCH 09/16/2020 30 1  26 8 - 34 3 pg Final    MCHC 09/16/2020 33 4  31 4 - 37 4 g/dL Final    RDW 09/16/2020 12 5  11 6 - 15 1 % Final    MPV 09/16/2020 11 4  8 9 - 12 7 fL Final    Platelets 53/26/9322 248  149 - 390 Thousands/uL Final    nRBC 09/16/2020 0  /100 WBCs Final    Neutrophils Relative 09/16/2020 66  43 - 75 % Final    Immat GRANS % 09/16/2020 0  0 - 2 % Final    Lymphocytes Relative 09/16/2020 25  14 - 44 % Final    Monocytes Relative 09/16/2020 8  4 - 12 % Final    Eosinophils Relative 09/16/2020 1  0 - 6 % Final    Basophils Relative 09/16/2020 0  0 - 1 % Final    Neutrophils Absolute 09/16/2020 5 70  1 85 - 7 62 Thousands/µL Final    Immature Grans Absolute 09/16/2020 0 02  0 00 - 0 20 Thousand/uL Final    Lymphocytes Absolute 09/16/2020 2 21  0 60 - 4 47 Thousands/µL Final    Monocytes Absolute 09/16/2020 0 70  0 17 - 1 22 Thousand/µL Final    Eosinophils Absolute 09/16/2020 0 09  0 00 - 0 61 Thousand/µL Final    Basophils Absolute 09/16/2020 0 02  0 00 - 0 10 Thousands/µL Final    Sodium 09/16/2020 140  136 - 145 mmol/L Final    Potassium 09/16/2020 4 2  3 5 - 5 3 mmol/L Final    Chloride 09/16/2020 108  100 - 108 mmol/L Final    CO2 09/16/2020 26  21 - 32 mmol/L Final    ANION GAP 09/16/2020 6  4 - 13 mmol/L Final    BUN 09/16/2020 18  5 - 25 mg/dL Final    Creatinine 09/16/2020 0 82  0 60 - 1 30 mg/dL Final    Standardized to IDMS reference method    Glucose, Fasting 09/16/2020 95  65 - 99 mg/dL Final    Specimen collection should occur prior to Sulfasalazine administration due to the potential for falsely depressed results  Specimen collection should occur prior to Sulfapyridine administration due to the potential for falsely elevated results   Calcium 09/16/2020 9 0  8 3 - 10 1 mg/dL Final    AST 09/16/2020 23  5 - 45 U/L Final    Specimen collection should occur prior to Sulfasalazine administration due to the potential for falsely depressed results   ALT 09/16/2020 38  12 - 78 U/L Final    Specimen collection should occur prior to Sulfasalazine and/or Sulfapyridine administration due to the potential for falsely depressed results   Alkaline Phosphatase 09/16/2020 57  46 - 116 U/L Final    Total Protein 09/16/2020 7 3  6 4 - 8 2 g/dL Final    Albumin 09/16/2020 4 3  3 5 - 5 0 g/dL Final    Total Bilirubin 09/16/2020 0 95  0 20 - 1 00 mg/dL Final    Use of this assay is not recommended for patients undergoing treatment with eltrombopag due to the potential for falsely elevated results   eGFR 09/16/2020 121  ml/min/1 73sq m Final    Cholesterol 09/16/2020 137  50 - 200 mg/dL Final    Cholesterol:       Desirable         <200 mg/dl       Borderline         200-239 mg/dl       High              >239           Triglycerides 09/16/2020 181* <=150 mg/dL Final    Triglyceride:     Normal          <150 mg/dl     Borderline High 150-199 mg/dl     High            200-499 mg/dl        Very High       >499 mg/dl    Specimen collection should occur prior to N-Acetylcysteine or Metamizole administration due to the potential for falsely depressed results   HDL, Direct 09/16/2020 29* >=40 mg/dL Final    HDL Cholesterol:       Low     <41 mg/dL  Specimen collection should occur prior to Metamizole administration due to the potential for falsley depressed results      LDL Calculated 09/16/2020 72  0 - 100 mg/dL Final    LDL Cholesterol:     Optimal           <100 mg/dl     Near Optimal      100-129 mg/dl     Above Optimal       Borderline High 130-159 mg/dl       High            160-189 mg/dl       Very High       >189 mg/dl         This screening LDL is a calculated result  It does not have the accuracy of the Direct Measured LDL in the monitoring of patients with hyperlipidemia and/or statin therapy  Direct Measure LDL ([facility]) must be ordered separately in these patients   Non-HDL-Chol (CHOL-HDL) 09/16/2020 108  mg/dl Final    TSH 3RD GENERATON 09/16/2020 1 420  0 358 - 3 740 uIU/mL Final    Using supplements with high doses of biotin 20 to more than 300 times greater than the adequate daily intake for adults of 30 mcg/day as established by the [employer], can cause falsely depress results

## 2021-03-25 ENCOUNTER — APPOINTMENT (OUTPATIENT)
Dept: PHYSICAL THERAPY | Age: 31
End: 2021-03-25
Payer: COMMERCIAL

## 2021-03-29 ENCOUNTER — APPOINTMENT (OUTPATIENT)
Dept: PHYSICAL THERAPY | Age: 31
End: 2021-03-29
Payer: COMMERCIAL

## 2021-04-06 ENCOUNTER — APPOINTMENT (OUTPATIENT)
Dept: RADIOLOGY | Age: 31
End: 2021-04-06
Payer: COMMERCIAL

## 2021-04-06 DIAGNOSIS — M54.2 NECK PAIN: ICD-10-CM

## 2021-04-06 DIAGNOSIS — G89.29 UPPER BACK PAIN, CHRONIC: ICD-10-CM

## 2021-04-06 DIAGNOSIS — M54.9 UPPER BACK PAIN, CHRONIC: ICD-10-CM

## 2021-04-06 DIAGNOSIS — M54.12 CERVICAL RADICULOPATHY: ICD-10-CM

## 2021-04-06 PROCEDURE — 72050 X-RAY EXAM NECK SPINE 4/5VWS: CPT

## 2021-04-07 ENCOUNTER — OFFICE VISIT (OUTPATIENT)
Dept: INTERNAL MEDICINE CLINIC | Age: 31
End: 2021-04-07
Payer: COMMERCIAL

## 2021-04-07 VITALS
BODY MASS INDEX: 44.1 KG/M2 | HEART RATE: 82 BPM | DIASTOLIC BLOOD PRESSURE: 80 MMHG | TEMPERATURE: 98.1 F | HEIGHT: 71 IN | WEIGHT: 315 LBS | SYSTOLIC BLOOD PRESSURE: 130 MMHG | OXYGEN SATURATION: 96 %

## 2021-04-07 DIAGNOSIS — M62.838 MUSCLE SPASM: ICD-10-CM

## 2021-04-07 DIAGNOSIS — M54.2 NECK PAIN: Primary | ICD-10-CM

## 2021-04-07 DIAGNOSIS — M54.12 CERVICAL RADICULOPATHY: ICD-10-CM

## 2021-04-07 DIAGNOSIS — G62.9 NEUROPATHY: ICD-10-CM

## 2021-04-07 DIAGNOSIS — M54.6 CHRONIC MIDLINE THORACIC BACK PAIN: ICD-10-CM

## 2021-04-07 DIAGNOSIS — G89.29 CHRONIC MIDLINE THORACIC BACK PAIN: ICD-10-CM

## 2021-04-07 PROCEDURE — 99214 OFFICE O/P EST MOD 30 MIN: CPT | Performed by: INTERNAL MEDICINE

## 2021-04-07 PROCEDURE — 4004F PT TOBACCO SCREEN RCVD TLK: CPT | Performed by: INTERNAL MEDICINE

## 2021-04-07 PROCEDURE — 3008F BODY MASS INDEX DOCD: CPT | Performed by: INTERNAL MEDICINE

## 2021-04-07 RX ORDER — CYCLOBENZAPRINE HCL 10 MG
10 TABLET ORAL
Qty: 30 TABLET | Refills: 0 | Status: SHIPPED | OUTPATIENT
Start: 2021-04-07 | End: 2021-05-05 | Stop reason: SDUPTHER

## 2021-04-07 NOTE — PROGRESS NOTES
Assessment/Plan:      Neck pain with muscle spasm and radiculopathy   -much improved but not completely resolved  - patient was counseled to continue with Flexeril at nighttime and will give him a refill today  - I suspect that patient's pain might be related to his posture while working and with muscle spasm  -will refer patient to physical therapy  - will order an MRI of the cervical spine without contrast but I have counseled patient not to get the test done unless his insurance company completely approves it  - patient likely benefit from a standing table to work on so we will prescribe this  Chronic midline thoracic back pain  - improving   - continue with  Flexeril and with p r n  naproxen   - will prescribe a standing  table for patient to work  With  -follow-up in 4 weeks     Diagnoses and all orders for this visit:    Neck pain  -     MRI cervical spine wo contrast; Future  -     Ambulatory referral to Physical Therapy; Future  -     cyclobenzaprine (FLEXERIL) 10 mg tablet; Take 1 tablet (10 mg total) by mouth daily at bedtime as needed for muscle spasms  -     Durable Medical Equipment    Chronic midline thoracic back pain  -     Durable Medical Equipment    Neuropathy  -     Ambulatory referral to Physical Therapy; Future    Cervical radiculopathy  -     MRI cervical spine wo contrast; Future  -     Ambulatory referral to Physical Therapy; Future  -     cyclobenzaprine (FLEXERIL) 10 mg tablet; Take 1 tablet (10 mg total) by mouth daily at bedtime as needed for muscle spasms  -     Durable Medical Equipment    Muscle spasm  -     cyclobenzaprine (FLEXERIL) 10 mg tablet; Take 1 tablet (10 mg total) by mouth daily at bedtime as needed for muscle spasms  -     Durable Medical Equipment             Subjective:      Patient ID: Lex Slaughter is a 31 y o  male  HPI  Patient presents for a follow-up visit regarding his neck and upper back pain    He states that the pain is down to a 2/10 right now and at the worst it is currently a 4/10  He states that the pain used to be a 7 to 8/10 but has improved with the muscle relaxant at night and p r n  naproxen but he states that he has not needed  The naproxen much  He is concerned because the pain has not resolved completely  He describes the pain as a pressure at the back of his neck and states that it radiates to his bilateral shoulders and he has numbness in his bilateral hands on and off  He denies any history of fall, motor vehicle accident or trauma prior to onset of pain  Of note, he states that when he sits down to work he tends to bend his neck and head and he works at home all day long with the [employer] pandemic  Denies any fever, chills, night sweats, chest pain, shortness of breath, palpitations, nausea, vomiting abdominal pain, diarrhea, constipation, myalgias, arthralgias  Of note, his lower back pain remains resolved  The following portions of the patient's history were reviewed and updated as appropriate:   He  has a past medical history of Acute torn meniscus of knee, unspecified laterality, initial encounter and Panic attack  He   Patient Active Problem List    Diagnosis Date Noted    Cervical radiculopathy 04/07/2021    Upper back pain, chronic 03/24/2021    Neck pain 03/24/2021    Neuropathy 03/24/2021    BMI 40 0-44 9, adult ([facility] ) 03/24/2021    Ankylosing spondylitis ([place] [address] ) 09/23/2020    Dyslipidemia 09/23/2020    Morbid obesity ([facility] ) 09/02/2020    Family history of ankylosing spondylitis 09/02/2020    Chronic low back pain with bilateral sciatica 09/02/2020    Generalized anxiety disorder 09/02/2020    Panic attacks 09/02/2020    Nicotine dependence-cigars 09/02/2020    Family history of premature coronary artery disease 09/02/2020     He  has a past surgical history that includes Adenoidectomy  His family history includes Alcohol abuse in his father; Ankylosing spondylitis in his father;  Anxiety disorder in his mother; Bipolar disorder in his father; Cancer in his paternal grandfather; Coronary artery disease in his maternal grandfather and maternal grandmother; Depression in his father, mother, and sister; Diabetes in his maternal grandmother; Drug abuse in his father; Heart attack in his maternal grandfather and maternal grandmother; Hypertension in his maternal grandfather and maternal grandmother; Substance Abuse in his father and mother  He  reports that he has been smoking cigars  He has smoked for the past 7 00 years  He has never used smokeless tobacco  He reports current alcohol use  He reports current drug use  Drug: Marijuana  Current Outpatient Medications   Medication Sig Dispense Refill    cyclobenzaprine (FLEXERIL) 10 mg tablet Take 1 tablet (10 mg total) by mouth daily at bedtime as needed for muscle spasms 30 tablet 0    naproxen (NAPROSYN) 500 mg tablet Take 1 tablet (500 mg total) by mouth 2 (two) times a day with meals (Patient not taking: Reported on 3/24/2021) 60 tablet 1     No current facility-administered medications for this visit  Current Outpatient Medications on File Prior to Visit   Medication Sig    [DISCONTINUED] cyclobenzaprine (FLEXERIL) 10 mg tablet Take 1 tablet (10 mg total) by mouth daily at bedtime    naproxen (NAPROSYN) 500 mg tablet Take 1 tablet (500 mg total) by mouth 2 (two) times a day with meals (Patient not taking: Reported on 3/24/2021)     No current facility-administered medications on file prior to visit  He is allergic to codeine and penicillins       Review of Systems   Constitutional: Negative for activity change, chills, fatigue, fever and unexpected weight change  HENT: Negative for ear pain, postnasal drip, rhinorrhea, sinus pressure and sore throat  Eyes: Negative for pain  Respiratory: Negative for cough, choking, chest tightness, shortness of breath and wheezing  Cardiovascular: Negative for chest pain, palpitations and leg swelling  Gastrointestinal: Negative for abdominal pain, constipation, diarrhea, nausea and vomiting  Genitourinary: Negative for dysuria and hematuria  Musculoskeletal: Positive for back pain (upper back pain on and off) and neck pain (mildly improved sinced last visit, pressure like and a 2/10 today and radiating of pressure to the shoulders and  numbness in the fingers of both hands b/l)  Negative for arthralgias, gait problem, joint swelling, myalgias and neck stiffness  Skin: Negative for pallor and rash  Neurological: Negative for dizziness, tremors, seizures, syncope, light-headedness and headaches  Hematological: Negative for adenopathy  Psychiatric/Behavioral: Negative for behavioral problems  Objective:      /80 (BP Location: Left arm, Patient Position: Sitting, Cuff Size: Large)   Pulse 82   Temp 98 1 °F (36 7 °C) (Temporal)   Ht 5' 11 46" (1 815 m)   Wt (!) 145 kg (319 lb 14 4 oz)   SpO2 96%   BMI 44 04 kg/m²          Physical Exam  Constitutional:       General: He is not in acute distress  Appearance: He is well-developed  He is not diaphoretic  HENT:      Head: Normocephalic and atraumatic  Right Ear: External ear normal       Left Ear: External ear normal       Nose: Nose normal       Mouth/Throat:      Pharynx: No oropharyngeal exudate  Eyes:      General: No scleral icterus  Right eye: No discharge  Left eye: No discharge  Conjunctiva/sclera: Conjunctivae normal       Pupils: Pupils are equal, round, and reactive to light  Neck:      Musculoskeletal: Normal range of motion and neck supple  Muscular tenderness (Tenderness in the posterior neck with paraspinal muscle hypertonicity  Tenderness appears to be in the trapezius muscle) present  Thyroid: No thyromegaly  Vascular: No JVD  Trachea: No tracheal deviation  Cardiovascular:      Rate and Rhythm: Normal rate and regular rhythm  Heart sounds: Normal heart sounds   No murmur  No friction rub  No gallop  Pulmonary:      Effort: Pulmonary effort is normal  No respiratory distress  Breath sounds: Normal breath sounds  No wheezing or rales  Chest:      Chest wall: No tenderness  Abdominal:      General: Bowel sounds are normal  There is no distension  Palpations: Abdomen is soft  There is no mass  Tenderness: There is no abdominal tenderness  There is no guarding or rebound  Musculoskeletal: Normal range of motion  General: No deformity  Cervical back: He exhibits tenderness (Tenderness in the cervical and upper thoracic spine with paraspinal muscle hypertonicity but with normal range of motion)  He exhibits normal range of motion  Thoracic back: He exhibits tenderness (Upper thoracic spine tenderness with paraspinal muscle hypertonicity)  Lymphadenopathy:      Cervical: No cervical adenopathy  Skin:     General: Skin is warm and dry  Coloration: Skin is not pale  Findings: No erythema or rash  Neurological:      Mental Status: He is alert and oriented to person, place, and time  Cranial Nerves: No cranial nerve deficit  Motor: No abnormal muscle tone  Coordination: Coordination normal       Deep Tendon Reflexes: Reflexes are normal and symmetric        Comments:  Cranial nerves 2-12 are intact bilaterally  Muscle strength is 5/5 in all extremities  Sensation is intact in bilateral face and extremities  Rapid alternating movement and finger-to-nose pointing test intact   Deep tendon reflexes are 2+ bilaterally  Gait is intact       Psychiatric:         Behavior: Behavior normal            Lab on 02/15/2021   Component Date Value Ref Range Status    Hepatitis B Surface Ag 02/15/2021 Non-reactive  Non-reactive, NonReactive - Confirmed Final    Hepatitis C Ab 02/15/2021 Non-reactive  Non-reactive Final    Hep B C IgM 02/15/2021 Non-reactive  Non-reactive Final    Hep B Core Total Ab 02/15/2021 Non-reactive Non-reactive Final    QFT Nil 02/15/2021 0 02  0 - 8 0 IU/ml Final    QFT TB1-NIL 02/15/2021 0 00  IU/ml Final    QFT TB2-NIL 02/15/2021 -0 01  IU/ml Final    QFT Mitogen-NIL 02/15/2021 >10 00  IU/ml Final    QFT Final Interpretation 02/15/2021 Negative  Negative Final    No Interferon-gamma response to M  tuberculosis antigens detected  Infection with M  tuberculosis is unlikely  A single negative result does not exclude infection with M  tuberculosis  In patients at high risk for M  tuberculosis infection, a second test should be considered in accordance with the 2017 ATS/IDSA/CDC Clinical Practice Guidelines for Diagnosis of Tuberculosis in Adults and Children  False negative results can be a result of incorrect blood sample collection or handling of the specimen affecting lymphocyte function   CRP 02/15/2021 <3 0  <3 0 mg/L Final    Sed Rate 02/15/2021 10  0 - 14 mm/hour Final    Rheumatoid Factor 02/15/2021 Negative  Negative Final    Cyclic Citrullin Peptide Ab 02/15/2021 6  0 - 19 units Final                              Negative               <20                            Weak positive      20 - 39                            Moderate positive  40 - 59                            Strong positive        >59   Appointment on 09/16/2020   Component Date Value Ref Range Status    HLA B27 09/16/2020 Positive   Final    HLA-B*27 Positive  This patient is positive for HLA-B*27  This procedure rules  out the B*27:06 and 27:09 alleles, which the literature  suggests are not associated with spondyloarthropathies  B27 allele interpretation for all loci based on IMGT/HLA  database version 3 38  This test was developed and its performance characteristics  determined by [facility]   It has not been cleared or approved  by the Food and Drug Administration  [facility] CLIA ID Number [id number]  This test was performed using PCR (Polymerase Chain Reaction)/SSOP  (Sequence Specific Oligonucleotide Probes) technique  SBT (Sequence  Based Typing) and/or SSP (Sequence Specific Primers) may be used as  supplemental methods when necessary  Please contact [employer] Customer  Service at [phone] if you have any questions     Director of [facility]   Dr Marito Tariq, PhD    WBC 09/16/2020 8 74  4 31 - 10 16 Thousand/uL Final    RBC 09/16/2020 5 08  3 88 - 5 62 Million/uL Final    Hemoglobin 09/16/2020 15 3  12 0 - 17 0 g/dL Final    Hematocrit 09/16/2020 45 8  36 5 - 49 3 % Final    MCV 09/16/2020 90  82 - 98 fL Final    MCH 09/16/2020 30 1  26 8 - 34 3 pg Final    MCHC 09/16/2020 33 4  31 4 - 37 4 g/dL Final    RDW 09/16/2020 12 5  11 6 - 15 1 % Final    MPV 09/16/2020 11 4  8 9 - 12 7 fL Final    Platelets 17/21/2009 248  149 - 390 Thousands/uL Final    nRBC 09/16/2020 0  /100 WBCs Final    Neutrophils Relative 09/16/2020 66  43 - 75 % Final    Immat GRANS % 09/16/2020 0  0 - 2 % Final    Lymphocytes Relative 09/16/2020 25  14 - 44 % Final    Monocytes Relative 09/16/2020 8  4 - 12 % Final    Eosinophils Relative 09/16/2020 1  0 - 6 % Final    Basophils Relative 09/16/2020 0  0 - 1 % Final    Neutrophils Absolute 09/16/2020 5 70  1 85 - 7 62 Thousands/µL Final    Immature Grans Absolute 09/16/2020 0 02  0 00 - 0 20 Thousand/uL Final    Lymphocytes Absolute 09/16/2020 2 21  0 60 - 4 47 Thousands/µL Final    Monocytes Absolute 09/16/2020 0 70  0 17 - 1 22 Thousand/µL Final    Eosinophils Absolute 09/16/2020 0 09  0 00 - 0 61 Thousand/µL Final    Basophils Absolute 09/16/2020 0 02  0 00 - 0 10 Thousands/µL Final    Sodium 09/16/2020 140  136 - 145 mmol/L Final    Potassium 09/16/2020 4 2  3 5 - 5 3 mmol/L Final    Chloride 09/16/2020 108  100 - 108 mmol/L Final    CO2 09/16/2020 26  21 - 32 mmol/L Final    ANION GAP 09/16/2020 6  4 - 13 mmol/L Final    BUN 09/16/2020 18  5 - 25 mg/dL Final    Creatinine 09/16/2020 0 82  0 60 - 1 30 mg/dL Final    Standardized to IDMS reference method    Glucose, Fasting 09/16/2020 95  65 - 99 mg/dL Final    Specimen collection should occur prior to Sulfasalazine administration due to the potential for falsely depressed results  Specimen collection should occur prior to Sulfapyridine administration due to the potential for falsely elevated results   Calcium 09/16/2020 9 0  8 3 - 10 1 mg/dL Final    AST 09/16/2020 23  5 - 45 U/L Final    Specimen collection should occur prior to Sulfasalazine administration due to the potential for falsely depressed results   ALT 09/16/2020 38  12 - 78 U/L Final    Specimen collection should occur prior to Sulfasalazine and/or Sulfapyridine administration due to the potential for falsely depressed results   Alkaline Phosphatase 09/16/2020 57  46 - 116 U/L Final    Total Protein 09/16/2020 7 3  6 4 - 8 2 g/dL Final    Albumin 09/16/2020 4 3  3 5 - 5 0 g/dL Final    Total Bilirubin 09/16/2020 0 95  0 20 - 1 00 mg/dL Final    Use of this assay is not recommended for patients undergoing treatment with eltrombopag due to the potential for falsely elevated results   eGFR 09/16/2020 121  ml/min/1 73sq m Final    Cholesterol 09/16/2020 137  50 - 200 mg/dL Final    Cholesterol:       Desirable         <200 mg/dl       Borderline         200-239 mg/dl       High              >239           Triglycerides 09/16/2020 181* <=150 mg/dL Final    Triglyceride:     Normal          <150 mg/dl     Borderline High 150-199 mg/dl     High            200-499 mg/dl        Very High       >499 mg/dl    Specimen collection should occur prior to N-Acetylcysteine or Metamizole administration due to the potential for falsely depressed results   HDL, Direct 09/16/2020 29* >=40 mg/dL Final    HDL Cholesterol:       Low     <41 mg/dL  Specimen collection should occur prior to Metamizole administration due to the potential for falsley depressed results      LDL Calculated 09/16/2020 72  0 - 100 mg/dL Final    LDL Cholesterol:     Optimal           <100 mg/dl     Near Optimal      100-129 mg/dl     Above Optimal       Borderline High 130-159 mg/dl       High            160-189 mg/dl       Very High       >189 mg/dl         This screening LDL is a calculated result  It does not have the accuracy of the Direct Measured LDL in the monitoring of patients with hyperlipidemia and/or statin therapy  Direct Measure LDL (XHA210) must be ordered separately in these patients   Non-HDL-Chol (CHOL-HDL) 09/16/2020 108  mg/dl Final    TSH 3RD GENERATON 09/16/2020 1 420  0 358 - 3 740 uIU/mL Final    Using supplements with high doses of biotin 20 to more than 300 times greater than the adequate daily intake for adults of 30 mcg/day as established by the [employer], can cause falsely depress results

## 2021-04-21 ENCOUNTER — TELEPHONE (OUTPATIENT)
Dept: INTERNAL MEDICINE CLINIC | Age: 31
End: 2021-04-21

## 2021-04-21 NOTE — TELEPHONE ENCOUNTER
Patient scheduled for an MRI cervical spine wo contrast for this Sunday 4/25 at [facility]  He was just calling to verify that it is covered through insurance  If this could please be reviewed and notify patient of answer  Thank you!

## 2021-04-25 ENCOUNTER — HOSPITAL ENCOUNTER (OUTPATIENT)
Dept: RADIOLOGY | Age: 31
Discharge: HOME | End: 2021-04-25
Payer: COMMERCIAL

## 2021-04-25 DIAGNOSIS — M54.2 NECK PAIN: ICD-10-CM

## 2021-04-25 DIAGNOSIS — M54.12 CERVICAL RADICULOPATHY: ICD-10-CM

## 2021-04-25 PROCEDURE — 72141 MRI NECK SPINE W/O DYE: CPT

## 2021-04-25 PROCEDURE — G1004 CDSM NDSC: HCPCS

## 2021-05-05 ENCOUNTER — OFFICE VISIT (OUTPATIENT)
Dept: INTERNAL MEDICINE CLINIC | Age: 31
End: 2021-05-05
Payer: COMMERCIAL

## 2021-05-05 VITALS
DIASTOLIC BLOOD PRESSURE: 76 MMHG | HEIGHT: 71 IN | TEMPERATURE: 98.4 F | BODY MASS INDEX: 43.85 KG/M2 | OXYGEN SATURATION: 98 % | WEIGHT: 313.2 LBS | SYSTOLIC BLOOD PRESSURE: 130 MMHG | HEART RATE: 72 BPM

## 2021-05-05 DIAGNOSIS — G89.29 CHRONIC MIDLINE LOW BACK PAIN WITH BILATERAL SCIATICA: ICD-10-CM

## 2021-05-05 DIAGNOSIS — M54.42 CHRONIC MIDLINE LOW BACK PAIN WITH BILATERAL SCIATICA: ICD-10-CM

## 2021-05-05 DIAGNOSIS — M54.9 UPPER BACK PAIN, CHRONIC: ICD-10-CM

## 2021-05-05 DIAGNOSIS — M54.2 NECK PAIN: Primary | ICD-10-CM

## 2021-05-05 DIAGNOSIS — M62.838 MUSCLE SPASM: ICD-10-CM

## 2021-05-05 DIAGNOSIS — M54.41 CHRONIC MIDLINE LOW BACK PAIN WITH BILATERAL SCIATICA: ICD-10-CM

## 2021-05-05 DIAGNOSIS — G89.29 UPPER BACK PAIN, CHRONIC: ICD-10-CM

## 2021-05-05 PROCEDURE — 3008F BODY MASS INDEX DOCD: CPT | Performed by: INTERNAL MEDICINE

## 2021-05-05 PROCEDURE — 99214 OFFICE O/P EST MOD 30 MIN: CPT | Performed by: INTERNAL MEDICINE

## 2021-05-05 PROCEDURE — 4004F PT TOBACCO SCREEN RCVD TLK: CPT | Performed by: INTERNAL MEDICINE

## 2021-05-05 PROCEDURE — 3725F SCREEN DEPRESSION PERFORMED: CPT | Performed by: INTERNAL MEDICINE

## 2021-05-05 RX ORDER — CYCLOBENZAPRINE HCL 10 MG
10 TABLET ORAL
Qty: 30 TABLET | Refills: 2 | Status: SHIPPED | OUTPATIENT
Start: 2021-05-05 | End: 2021-11-19 | Stop reason: HOSPADM

## 2021-05-05 RX ORDER — CYCLOBENZAPRINE HCL 10 MG
10 TABLET ORAL
Qty: 30 TABLET | Refills: 2 | Status: SHIPPED | OUTPATIENT
Start: 2021-05-05 | End: 2021-05-05

## 2021-05-05 RX ORDER — NAPROXEN 500 MG/1
500 TABLET ORAL 2 TIMES DAILY PRN
Qty: 60 TABLET | Refills: 1 | Status: SHIPPED | OUTPATIENT
Start: 2021-05-05 | End: 2021-05-05

## 2021-05-05 RX ORDER — NAPROXEN 500 MG/1
500 TABLET ORAL 2 TIMES DAILY PRN
Qty: 60 TABLET | Refills: 1 | Status: SHIPPED | OUTPATIENT
Start: 2021-05-05 | End: 2021-11-19 | Stop reason: HOSPADM

## 2021-05-05 NOTE — PROGRESS NOTES
Assessment/Plan:    Neck pain with muscle spasm  -MRI cervical spine done on April 25th, 2021 showed a tiny central annular fissure and minor bulge without stenosis and without compression at C3-C4 only  -I suspect that patient's symptoms are more secondary to his muscle strain and muscle spasm  - improving with Flexeril  -will refill Flexeril and naproxen  -he was counseled to take the naproxen as needed and always with meals  -patient will start physical therapy very soon and will likely benefit from it and will also benefit from using his standing work desk  -counseling given on proper posture even after physical therapy  -follow-up in 3 months    Upper back pain with muscle spasm  -improving with Flexeril  -continue with Flexeril and naproxen as needed  -start and continue with physical therapy    Low back pain  -resolved  -will continue to monitor patient clinically since he does have ankylosing spondylitis  -follow-up in 3 months     Diagnoses and all orders for this visit:    Neck pain  -     Discontinue: cyclobenzaprine (FLEXERIL) 10 mg tablet; Take 1 tablet (10 mg total) by mouth daily at bedtime as needed for muscle spasms  -     cyclobenzaprine (FLEXERIL) 10 mg tablet; Take 1 tablet (10 mg total) by mouth daily at bedtime as needed for muscle spasms  -     naproxen (NAPROSYN) 500 mg tablet; Take 1 tablet (500 mg total) by mouth 2 (two) times a day as needed for mild pain or moderate pain Always with meals    Upper back pain, chronic  -     cyclobenzaprine (FLEXERIL) 10 mg tablet; Take 1 tablet (10 mg total) by mouth daily at bedtime as needed for muscle spasms  -     naproxen (NAPROSYN) 500 mg tablet; Take 1 tablet (500 mg total) by mouth 2 (two) times a day as needed for mild pain or moderate pain Always with meals    Chronic midline low back pain with bilateral sciatica  -     Discontinue: naproxen (NAPROSYN) 500 mg tablet;  Take 1 tablet (500 mg total) by mouth 2 (two) times a day as needed for mild pain or moderate pain Always with meals    Muscle spasm  -     Discontinue: cyclobenzaprine (FLEXERIL) 10 mg tablet; Take 1 tablet (10 mg total) by mouth daily at bedtime as needed for muscle spasms  -     cyclobenzaprine (FLEXERIL) 10 mg tablet; Take 1 tablet (10 mg total) by mouth daily at bedtime as needed for muscle spasms  -     naproxen (NAPROSYN) 500 mg tablet; Take 1 tablet (500 mg total) by mouth 2 (two) times a day as needed for mild pain or moderate pain Always with meals          Subjective:      Patient ID: Theron Hurst is a 31 y o  male  HPI  Patient presents for a follow-up visit regarding his neck pain and upper thoracic back pain  He states that the pain is located in the back of his neck, especially in the lower part of the neck and also the upper part of his thoracic back  He grades the pain as a 2 to 3/10 and states that it is nonradiating  Pain is worse as the day goes on and he did state that he normally sits at his computer all day long to work  He recently bought himself a standing work table and is due to go to start physical therapy soon  He states that he has not been for physical therapy for about 2 months  He has been taking the Flexeril which he believes helps but states that has not needed to take the naproxen in about 2 months  He did an MRI of the cervical spine recently and wants the results reviewed  He denies fever, chills, night sweats, headaches, dizziness, nausea, vomiting, abdominal pain, diarrhea, constipation, cough, chest pain, shortness of breath, palpitations  The following portions of the patient's history were reviewed and updated as appropriate:   He  has a past medical history of Acute torn meniscus of knee, unspecified laterality, initial encounter and Panic attack    He   Patient Active Problem List    Diagnosis Date Noted    Muscle spasm 05/05/2021    Cervical radiculopathy 04/07/2021    Upper back pain, chronic 03/24/2021    Neck pain 03/24/2021    Neuropathy 03/24/2021    BMI 40 0-44 9, adult ([facility] ) 03/24/2021    Ankylosing spondylitis ([place] [address] ) 09/23/2020    Dyslipidemia 09/23/2020    Morbid obesity ([place] [address] ) 09/02/2020    Family history of ankylosing spondylitis 09/02/2020    Chronic low back pain with bilateral sciatica 09/02/2020    Generalized anxiety disorder 09/02/2020    Panic attacks 09/02/2020    Nicotine dependence-cigars 09/02/2020    Family history of premature coronary artery disease 09/02/2020     He  has a past surgical history that includes Adenoidectomy  His family history includes Alcohol abuse in his father; Ankylosing spondylitis in his father; Anxiety disorder in his mother; Bipolar disorder in his father; Cancer in his paternal grandfather; Coronary artery disease in his maternal grandfather and maternal grandmother; Depression in his father, mother, and sister; Diabetes in his maternal grandmother; Drug abuse in his father; Heart attack in his maternal grandfather and maternal grandmother; Hypertension in his maternal grandfather and maternal grandmother; Substance Abuse in his father and mother  He  reports that he has been smoking cigars  He has smoked for the past 7 00 years  He has never used smokeless tobacco  He reports current alcohol use  He reports current drug use  Drug: Marijuana  Current Outpatient Medications   Medication Sig Dispense Refill    cyclobenzaprine (FLEXERIL) 10 mg tablet Take 1 tablet (10 mg total) by mouth daily at bedtime as needed for muscle spasms 30 tablet 2    naproxen (NAPROSYN) 500 mg tablet Take 1 tablet (500 mg total) by mouth 2 (two) times a day as needed for mild pain or moderate pain Always with meals 60 tablet 1     No current facility-administered medications for this visit        Current Outpatient Medications on File Prior to Visit   Medication Sig    [DISCONTINUED] cyclobenzaprine (FLEXERIL) 10 mg tablet Take 1 tablet (10 mg total) by mouth daily at bedtime as needed for muscle spasms    [DISCONTINUED] naproxen (NAPROSYN) 500 mg tablet Take 1 tablet (500 mg total) by mouth 2 (two) times a day with meals (Patient not taking: Reported on 3/24/2021)     No current facility-administered medications on file prior to visit  He is allergic to codeine and penicillins       Review of Systems   Constitutional: Negative for activity change, chills, fatigue, fever and unexpected weight change  HENT: Negative for ear pain, postnasal drip, rhinorrhea, sinus pressure and sore throat  Eyes: Negative for pain  Respiratory: Negative for cough, choking, chest tightness, shortness of breath and wheezing  Cardiovascular: Negative for chest pain, palpitations and leg swelling  Gastrointestinal: Negative for abdominal pain, constipation, diarrhea, nausea and vomiting  Genitourinary: Negative for dysuria and hematuria  Musculoskeletal: Positive for back pain (upper back pain 2/10 ) and neck pain (2/10 in the back of the neck and non radiating, worse on flexion)  Negative for arthralgias, gait problem, joint swelling, myalgias and neck stiffness  Skin: Negative for pallor and rash  Neurological: Negative for dizziness, tremors, seizures, syncope, light-headedness and headaches  Hematological: Negative for adenopathy  Psychiatric/Behavioral: Negative for behavioral problems  Objective:      /76 (BP Location: Left arm, Patient Position: Sitting, Cuff Size: Large)   Pulse 72   Temp 98 4 °F (36 9 °C) (Temporal)   Ht 5' 11 46" (1 815 m)   Wt (!) 142 kg (313 lb 3 2 oz)   SpO2 98%   BMI 43 12 kg/m²          Physical Exam  Constitutional:       General: He is not in acute distress  Appearance: He is well-developed  He is not diaphoretic  HENT:      Head: Normocephalic and atraumatic  Right Ear: External ear normal       Left Ear: External ear normal       Nose: Nose normal       Mouth/Throat:      Mouth: Mucous membranes are dry        Pharynx: Posterior oropharyngeal erythema (Dry mucous membranes with mild oropharyngeal erythema) present  No oropharyngeal exudate  Eyes:      General: No scleral icterus  Right eye: No discharge  Left eye: No discharge  Conjunctiva/sclera: Conjunctivae normal       Pupils: Pupils are equal, round, and reactive to light  Neck:      Musculoskeletal: Normal range of motion and neck supple  Muscular tenderness ( mild muscular tenderness in the lower posterior cervical neck with paraspinal muscle hypertonicity, worse on flexion, rotation to the left, and side bending to the right) present  Thyroid: No thyromegaly  Vascular: No JVD  Trachea: No tracheal deviation  Cardiovascular:      Rate and Rhythm: Normal rate and regular rhythm  Heart sounds: Normal heart sounds  No murmur  No friction rub  No gallop  Pulmonary:      Effort: Pulmonary effort is normal  No respiratory distress  Breath sounds: No decreased air movement  Decreased breath sounds ( decreased breath sounds in all lung fields likely secondary to body habitus) present  No wheezing or rales  Chest:      Chest wall: No tenderness  Abdominal:      General: Bowel sounds are normal  There is no distension  Palpations: Abdomen is soft  There is no mass  Tenderness: There is no abdominal tenderness  There is no guarding or rebound  Musculoskeletal: Normal range of motion  General: No deformity  Cervical back: He exhibits tenderness (Mild tenderness and paraspinal muscle hypertonicity especially in the lower cervical and upper thoracic region) and spasm  Lymphadenopathy:      Cervical: No cervical adenopathy  Skin:     General: Skin is warm and dry  Coloration: Skin is not pale  Findings: No erythema or rash  Neurological:      Mental Status: He is alert and oriented to person, place, and time  Cranial Nerves: No cranial nerve deficit  Motor: No abnormal muscle tone  Coordination: Coordination normal       Deep Tendon Reflexes: Reflexes are normal and symmetric  Psychiatric:         Behavior: Behavior normal            Lab on 02/15/2021   Component Date Value Ref Range Status    Hepatitis B Surface Ag 02/15/2021 Non-reactive  Non-reactive, NonReactive - Confirmed Final    Hepatitis C Ab 02/15/2021 Non-reactive  Non-reactive Final    Hep B C IgM 02/15/2021 Non-reactive  Non-reactive Final    Hep B Core Total Ab 02/15/2021 Non-reactive  Non-reactive Final    QFT Nil 02/15/2021 0 02  0 - 8 0 IU/ml Final    QFT TB1-NIL 02/15/2021 0 00  IU/ml Final    QFT TB2-NIL 02/15/2021 -0 01  IU/ml Final    QFT Mitogen-NIL 02/15/2021 >10 00  IU/ml Final    QFT Final Interpretation 02/15/2021 Negative  Negative Final    No Interferon-gamma response to M  tuberculosis antigens detected  Infection with M  tuberculosis is unlikely  A single negative result does not exclude infection with M  tuberculosis  In patients at high risk for M  tuberculosis infection, a second test should be considered in accordance with the 2017 ATS/IDSA/CDC Clinical Practice Guidelines for Diagnosis of Tuberculosis in Adults and Children  False negative results can be a result of incorrect blood sample collection or handling of the specimen affecting lymphocyte function   CRP 02/15/2021 <3 0  <3 0 mg/L Final    Sed Rate 02/15/2021 10  0 - 14 mm/hour Final    Rheumatoid Factor 02/15/2021 Negative  Negative Final    Cyclic Citrullin Peptide Ab 02/15/2021 6  0 - 19 units Final                              Negative               <20                            Weak positive      20 - 39                            Moderate positive  40 - 59                            Strong positive        >59   Appointment on 09/16/2020   Component Date Value Ref Range Status    HLA B27 09/16/2020 Positive   Final    HLA-B*27 Positive  This patient is positive for HLA-B*27   This procedure rules  out the B*27:06 and 27:09 alleles, which the literature  suggests are not associated with spondyloarthropathies  B27 allele interpretation for all loci based on IMGT/HLA  database version 3 38  This test was developed and its performance characteristics  determined by [facility]   It has not been cleared or approved  by the Food and Drug Administration  [facility] CLIA ID Number [id number]  This test was performed using PCR (Polymerase Chain Reaction)/SSOP  (Sequence Specific Oligonucleotide Probes) technique  SBT (Sequence  Based Typing) and/or SSP (Sequence Specific Primers) may be used as  supplemental methods when necessary  Please contact [facility] Customer  Service at [phone] if you have any questions     Director of [facility]   Dr Talia Plummer, PhD    WBC 09/16/2020 8 74  4 31 - 10 16 Thousand/uL Final    RBC 09/16/2020 5 08  3 88 - 5 62 Million/uL Final    Hemoglobin 09/16/2020 15 3  12 0 - 17 0 g/dL Final    Hematocrit 09/16/2020 45 8  36 5 - 49 3 % Final    MCV 09/16/2020 90  82 - 98 fL Final    MCH 09/16/2020 30 1  26 8 - 34 3 pg Final    MCHC 09/16/2020 33 4  31 4 - 37 4 g/dL Final    RDW 09/16/2020 12 5  11 6 - 15 1 % Final    MPV 09/16/2020 11 4  8 9 - 12 7 fL Final    Platelets 59/75/0905 248  149 - 390 Thousands/uL Final    nRBC 09/16/2020 0  /100 WBCs Final    Neutrophils Relative 09/16/2020 66  43 - 75 % Final    Immat GRANS % 09/16/2020 0  0 - 2 % Final    Lymphocytes Relative 09/16/2020 25  14 - 44 % Final    Monocytes Relative 09/16/2020 8  4 - 12 % Final    Eosinophils Relative 09/16/2020 1  0 - 6 % Final    Basophils Relative 09/16/2020 0  0 - 1 % Final    Neutrophils Absolute 09/16/2020 5 70  1 85 - 7 62 Thousands/µL Final    Immature Grans Absolute 09/16/2020 0 02  0 00 - 0 20 Thousand/uL Final    Lymphocytes Absolute 09/16/2020 2 21  0 60 - 4 47 Thousands/µL Final    Monocytes Absolute 09/16/2020 0 70  0 17 - 1 22 Thousand/µL Final    Eosinophils Absolute 09/16/2020 0 09  0 00 - 0 61 Thousand/µL Final    Basophils Absolute 09/16/2020 0 02  0 00 - 0 10 Thousands/µL Final    Sodium 09/16/2020 140  136 - 145 mmol/L Final    Potassium 09/16/2020 4 2  3 5 - 5 3 mmol/L Final    Chloride 09/16/2020 108  100 - 108 mmol/L Final    CO2 09/16/2020 26  21 - 32 mmol/L Final    ANION GAP 09/16/2020 6  4 - 13 mmol/L Final    BUN 09/16/2020 18  5 - 25 mg/dL Final    Creatinine 09/16/2020 0 82  0 60 - 1 30 mg/dL Final    Standardized to IDMS reference method    Glucose, Fasting 09/16/2020 95  65 - 99 mg/dL Final    Specimen collection should occur prior to Sulfasalazine administration due to the potential for falsely depressed results  Specimen collection should occur prior to Sulfapyridine administration due to the potential for falsely elevated results   Calcium 09/16/2020 9 0  8 3 - 10 1 mg/dL Final    AST 09/16/2020 23  5 - 45 U/L Final    Specimen collection should occur prior to Sulfasalazine administration due to the potential for falsely depressed results   ALT 09/16/2020 38  12 - 78 U/L Final    Specimen collection should occur prior to Sulfasalazine and/or Sulfapyridine administration due to the potential for falsely depressed results   Alkaline Phosphatase 09/16/2020 57  46 - 116 U/L Final    Total Protein 09/16/2020 7 3  6 4 - 8 2 g/dL Final    Albumin 09/16/2020 4 3  3 5 - 5 0 g/dL Final    Total Bilirubin 09/16/2020 0 95  0 20 - 1 00 mg/dL Final    Use of this assay is not recommended for patients undergoing treatment with eltrombopag due to the potential for falsely elevated results      eGFR 09/16/2020 121  ml/min/1 73sq m Final    Cholesterol 09/16/2020 137  50 - 200 mg/dL Final    Cholesterol:       Desirable         <200 mg/dl       Borderline         200-239 mg/dl       High              >239           Triglycerides 09/16/2020 181* <=150 mg/dL Final    Triglyceride:     Normal          <150 mg/dl     Borderline High 150-199 mg/dl     High            200-499 mg/dl Very High       >499 mg/dl    Specimen collection should occur prior to N-Acetylcysteine or Metamizole administration due to the potential for falsely depressed results   HDL, Direct 09/16/2020 29* >=40 mg/dL Final    HDL Cholesterol:       Low     <41 mg/dL  Specimen collection should occur prior to Metamizole administration due to the potential for falsley depressed results   LDL Calculated 09/16/2020 72  0 - 100 mg/dL Final    LDL Cholesterol:     Optimal           <100 mg/dl     Near Optimal      100-129 mg/dl     Above Optimal       Borderline High 130-159 mg/dl       High            160-189 mg/dl       Very High       >189 mg/dl         This screening LDL is a calculated result  It does not have the accuracy of the Direct Measured LDL in the monitoring of patients with hyperlipidemia and/or statin therapy  Direct Measure LDL (NTC945) must be ordered separately in these patients   Non-HDL-Chol (CHOL-HDL) 09/16/2020 108  mg/dl Final    TSH 3RD GENERATON 09/16/2020 1 420  0 358 - 3 740 uIU/mL Final    Using supplements with high doses of biotin 20 to more than 300 times greater than the adequate daily intake for adults of 30 mcg/day as established by the [employer], can cause falsely depress results

## 2021-05-06 ENCOUNTER — EVALUATION (OUTPATIENT)
Dept: PHYSICAL THERAPY | Facility: REHABILITATION | Age: 31
End: 2021-05-06
Payer: COMMERCIAL

## 2021-05-06 DIAGNOSIS — M54.6 THORACIC SPINE PAIN: ICD-10-CM

## 2021-05-06 DIAGNOSIS — M54.2 NECK PAIN: Primary | ICD-10-CM

## 2021-05-06 DIAGNOSIS — G62.9 NEUROPATHY: ICD-10-CM

## 2021-05-06 DIAGNOSIS — M54.12 CERVICAL RADICULOPATHY: ICD-10-CM

## 2021-05-06 PROCEDURE — 97161 PT EVAL LOW COMPLEX 20 MIN: CPT | Performed by: PHYSICAL THERAPIST

## 2021-05-06 PROCEDURE — 97140 MANUAL THERAPY 1/> REGIONS: CPT | Performed by: PHYSICAL THERAPIST

## 2021-05-06 PROCEDURE — 97112 NEUROMUSCULAR REEDUCATION: CPT | Performed by: PHYSICAL THERAPIST

## 2021-05-06 NOTE — PROGRESS NOTES
PT Evaluation     Today's date: 2021  Patient name: Jet Henson IV  : 1990  MRN: [id number]  Referring provider: Joana Damon DO  Dx:   Encounter Diagnosis     ICD-10-CM    1  Thoracic spine pain  M54 6    2  Neck pain  M54 2 Ambulatory referral to Physical Therapy   3  Neuropathy  G62 9 Ambulatory referral to Physical Therapy   4  Cervical radiculopathy  M54 12 Ambulatory referral to Physical Therapy                  Assessment  Assessment details: Babatunde Acevedo is a pleasant 31 y o  male who presents with chronic thoracic and cervical pain  The patient's greatest concerns are the pain he is experiencing, worry over not knowing what's wrong and fear of not being able to keep active  No further referral appears necessary at this time based upon examination results  Primary movement impairment diagnosis of postural control deficit, cervical and thoracic spine hypomobility, periscapular neuromuscular control deficit resulting in pathoanatomical symptoms of chronic cervical and thoracic spine pain which is limiting his ability to exercise or recreation, socialize with friends and turn to look over shoulder  Physical exam is consistent with neck pain with postural control deficit with some positive testing regarding cervical radiculopathy  Patient demonstrates signs of increased neural testing, but low irritability level as his radicular symptoms self resolve within seconds of being reproduced with provocation testing  Patient demonstrates poor postural control and severe limitation into cervical retraction with poor ability to activate his DNF musculature in supine  Pt  will benefit from skilled PT services that includes manual therapy techniques to enhance tissue extensibility, neuromuscular re-education to facilitate motor control, therapeutic exercise to increase functional mobility, and modalities prn to reduce pain and inflammation        Primary Impairments:  1) postural control deficit   2) cervical and thoracic spine hypomobility   3) periscapular neuromuscular control deficit       Impairments: abnormal coordination, abnormal muscle firing, abnormal muscle tone, abnormal or restricted ROM, abnormal movement, activity intolerance, difficulty understanding, impaired physical strength, lacks appropriate home exercise program, pain with function, poor posture  and poor body mechanics    Symptom irritability: lowUnderstanding of Dx/Px/POC: good   Prognosis: good    Goals  STG  Patient will be independent with home exercise program in 1-2 visits  Patient will report 50% improvement in neck pain at the end of the day in 3 weeks  Patient will achieve full cervical AROM in 3 weeks  LTG  Patient will be independent in comprehensive HEP upon discharge  Patient will achieve 20s DNF endurance test upon discharge  Patient will report no neck pain with 8 hour work day upon discharge  Patient will achieve goal FOTO score upon discharge  Plan  Patient would benefit from: skilled physical therapy  Planned therapy interventions: activity modification, joint mobilization, manual therapy, motor coordination training, neuromuscular re-education, patient education, self care, therapeutic activities, therapeutic exercise, graded activity, home exercise program, behavior modification, graded exercise, functional ROM exercises, strengthening, flexibility and body mechanics training  Frequency: 2x week  Duration in weeks: 6  Treatment plan discussed with: patient        Subjective Evaluation    History of Present Illness  Mechanism of injury: Patient reports a 10 month history of severe neck pain with radicular symptoms  He was diagnosed with ankylosing spondylitis at that point and was treated with physical therapy and anti inflammatory drugs  He had some minor relief, but did not have resolution of symptoms  He recently had an MRI performed that demonstrated minor disc bulging at C3/4   Patient has since stopped the anti inflammatory medication, and has begun taking a muscle relaxer which is helping  Patient has been using a standing desk at work which is helping his symptoms  He reports that he is able to perform all daily activities without significant restriction, but he continues to have persistent pain on a daily basis  Pain  Current pain ratin  At best pain ratin  At worst pain rating: 3 (progressive throughout the day)  Location: central mid thoracic; B/L base of neck  Quality: dull ache, sharp, tight, pulling and discomfort  Relieving factors: change in position  Aggravating factors: sitting (work)      Diagnostic Tests  MRI studies: abnormal (C/4 disc noted in Epic)  Treatments  Previous treatment: physical therapy  Patient Goals  Patient goals for therapy: decreased pain, independence with ADLs/IADLs, return to sport/leisure activities and increased motion          Objective     General Comments:      Cervical/Thoracic Comments  Gait: WNL    Standing Posture: increased thoracic kyphosis, forward head posture    Cervical Spine ROM:   Flexion: 25% limitation; p! Extension: 25% limitation; p! SB: R   30 deg  L 30#  Rotation: R 60 deg    L 70 deg  Cervical retraction: 90% limited; poor activation    Thoracic Spine ROM:   Flexion: WNL  Extension: 75% limited    Scapular retraction: R>L upper trap compensation; poor periscapular compensation    Palpation: TTP suboccipitals, hypertonicity B/L upper trap    Special Tests:   Modified VBI: neg  Sharp Purcer: neg  Alar Ligament: neg  Distraction: neg  Spurling A: pos  Upper Limb Tension Testing: pos B/L median  DNF endurance: <5s    Upper Quarter Screen:   Dermatomes: intact  Myotomes: intact  Reflexes: 2+ B/L bicep/brachioradialis     Upper Quarter Strength:  WNL                 Precautions: hx of AS      Manuals 5/6            SOR TB 4 min            Upper trap release TB 4 min                                      Neuro Re-Ed Cervical retraction 20x supine; HEP            Scapular retraction 20x; HEP            Prone periscapular             Row/Ext                                       Education 10 min HEP and POC            Ther Ex             UBE             Levator scap stretch 3x30s HEP            Thoracic spine mobility             Seated thoracic extension                                                                 Ther Activity                                       Gait Training                                       Modalities

## 2021-05-12 ENCOUNTER — OFFICE VISIT (OUTPATIENT)
Dept: PHYSICAL THERAPY | Facility: REHABILITATION | Age: 31
End: 2021-05-12
Payer: COMMERCIAL

## 2021-05-12 DIAGNOSIS — G62.9 NEUROPATHY: ICD-10-CM

## 2021-05-12 DIAGNOSIS — M54.12 CERVICAL RADICULOPATHY: ICD-10-CM

## 2021-05-12 DIAGNOSIS — M54.2 NECK PAIN: Primary | ICD-10-CM

## 2021-05-12 PROCEDURE — 97140 MANUAL THERAPY 1/> REGIONS: CPT | Performed by: PHYSICAL THERAPIST

## 2021-05-12 PROCEDURE — 97112 NEUROMUSCULAR REEDUCATION: CPT | Performed by: PHYSICAL THERAPIST

## 2021-05-12 PROCEDURE — 97110 THERAPEUTIC EXERCISES: CPT | Performed by: PHYSICAL THERAPIST

## 2021-05-12 NOTE — PROGRESS NOTES
Daily Note     Today's date: 2021  Patient name: Jayjay Bradshaw IV  : 1990  MRN: [id number]  Referring provider: Armando Galvan DO  Dx:   Encounter Diagnosis     ICD-10-CM    1  Neck pain  M54 2    2  Neuropathy  G62 9    3  Cervical radiculopathy  M54 12                   Subjective: Patient reports that he has been compliant with HEP, but continues to struggle with cervical retraction  Objective: See treatment diary below      Assessment:  Patient continues to demonstrate significant restriction into cervical retraction  Patient responds well to tactile cueing for postural control  Patient educated in thoracic spine mobility exercises to update HEP  Progress periscapular strengthening exercises next visit  Plan: Continue per plan of care        Precautions: hx of AS      Manuals       SOR TB 4 min Tb 6 min      Upper trap release TB 4 min TB 6 min                      Neuro Re-Ed        Cervical retraction 20x supine; HEP 20x       Scapular retraction 20x; HEP 20x; B/L retraction otb w/ cervical retraction      Prone periscapular        Row/Ext                        Education 10 min HEP and POC 15 min; HEP      Ther Ex        UBE  6 min retro      Levator scap stretch 3x30s HEP       Thoracic spine mobility  2 min; 1 pillow folded      Seated thoracic extension  20x; w/ 1/2 foam                                      Ther Activity                        Gait Training                        Modalities

## 2021-05-14 ENCOUNTER — OFFICE VISIT (OUTPATIENT)
Dept: PHYSICAL THERAPY | Facility: REHABILITATION | Age: 31
End: 2021-05-14
Payer: COMMERCIAL

## 2021-05-14 DIAGNOSIS — M54.12 CERVICAL RADICULOPATHY: ICD-10-CM

## 2021-05-14 DIAGNOSIS — M54.2 NECK PAIN: ICD-10-CM

## 2021-05-14 DIAGNOSIS — G62.9 NEUROPATHY: Primary | ICD-10-CM

## 2021-05-14 PROCEDURE — 97112 NEUROMUSCULAR REEDUCATION: CPT | Performed by: PHYSICAL THERAPIST

## 2021-05-14 PROCEDURE — 97140 MANUAL THERAPY 1/> REGIONS: CPT | Performed by: PHYSICAL THERAPIST

## 2021-05-14 PROCEDURE — 97110 THERAPEUTIC EXERCISES: CPT | Performed by: PHYSICAL THERAPIST

## 2021-05-14 NOTE — PROGRESS NOTES
Daily Note     Today's date: 2021  Patient name: Aakash Patterson IV  : 1990  MRN: [id number]  Referring provider: Suman Gill DO  Dx:   Encounter Diagnosis     ICD-10-CM    1  Neuropathy  G62 9    2  Cervical radiculopathy  M54 12    3  Neck pain  M54 2                   Subjective: Patient reports no significant changes since last visit  Objective: See treatment diary below      Assessment: Patient demonstrates improvement in DNF activation with postural control exercises  Patient has good response to tx, reporting periscapular fatigue without increase in symptoms  Patient educated in scaption against TB to update HEP  Plan: Continue per plan of care        Precautions: hx of AS      Manuals      SOR TB 4 min Tb 6 min TB 6 min     Upper trap release TB 4 min TB 6 min TB 6 min B/L                     Neuro Re-Ed        Cervical retraction 20x supine; HEP 20x  20x     Scapular retraction 20x; HEP 20x; B/L retraction otb w/ cervical retraction      Prone periscapular   4x10; T w/ cervical retraction     Row/Ext        Scaption against TB   4x10; w/ cervical retraction             Education 10 min HEP and POC 15 min; HEP      Ther Ex        UBE  6 min retro 6 min retro     Levator scap stretch 3x30s HEP       Thoracic spine mobility  2 min; 1 pillow folded 2 min     Seated thoracic extension  20x; w/ 1/2 foam                                      Ther Activity                        Gait Training                        Modalities

## 2021-05-19 ENCOUNTER — OFFICE VISIT (OUTPATIENT)
Dept: PHYSICAL THERAPY | Facility: REHABILITATION | Age: 31
End: 2021-05-19
Payer: COMMERCIAL

## 2021-05-19 DIAGNOSIS — M54.12 CERVICAL RADICULOPATHY: ICD-10-CM

## 2021-05-19 DIAGNOSIS — G62.9 NEUROPATHY: Primary | ICD-10-CM

## 2021-05-19 DIAGNOSIS — M54.2 NECK PAIN: ICD-10-CM

## 2021-05-19 PROCEDURE — 97112 NEUROMUSCULAR REEDUCATION: CPT | Performed by: PHYSICAL THERAPIST

## 2021-05-19 PROCEDURE — 97140 MANUAL THERAPY 1/> REGIONS: CPT | Performed by: PHYSICAL THERAPIST

## 2021-05-19 PROCEDURE — 97110 THERAPEUTIC EXERCISES: CPT | Performed by: PHYSICAL THERAPIST

## 2021-05-19 NOTE — PROGRESS NOTES
Daily Note     Today's date: 2021  Patient name: Donna Cunha IV  : 1990  MRN: [id number]  Referring provider: Patti Chopra DO  Dx:   Encounter Diagnosis     ICD-10-CM    1  Neuropathy  G62 9    2  Cervical radiculopathy  M54 12    3  Neck pain  M54 2                   Subjective: Patient reports that he has had relief from his neck pain for the past few days up until this afternoon  He presents with B/L neck "tightness" and pain  Objective: See treatment diary below      Assessment:  Patient responded well to manual therapy and MET to improve "tightness" symptoms  Patient educated to continue to be regular with postural training and use MET as needed to address onset of symptoms  Plan: Continue per plan of care        Precautions: hx of AS      Manuals     SOR TB 4 min Tb 6 min TB 6 min TB 6 min    Upper trap release TB 4 min TB 6 min TB 6 min B/L TB 6 min B/L    Mid thoracic PA mob    TB 3 min; gr  V            Neuro Re-Ed        Cervical retraction 20x supine; HEP 20x  20x 20x supine    Scapular retraction 20x; HEP 20x; B/L retraction otb w/ cervical retraction  20x; B/L retraction otb w/ cervical retraction    Prone periscapular   4x10; T w/ cervical retraction     Thoracic extension MET    5x5s    Scaption against TB   4x10; w/ cervical retraction 20x; w/ cervical retraction otb            Education 10 min HEP and POC 15 min; HEP      Ther Ex        UBE  6 min retro 6 min retro 6 min retro    Levator scap stretch 3x30s HEP       Thoracic spine mobility  2 min; 1 pillow folded 2 min 2 min; 1 pillow folded    Seated thoracic extension  20x; w/ 1/2 foam                                      Ther Activity                        Gait Training                        Modalities

## 2021-05-21 ENCOUNTER — OFFICE VISIT (OUTPATIENT)
Dept: PHYSICAL THERAPY | Facility: REHABILITATION | Age: 31
End: 2021-05-21
Payer: COMMERCIAL

## 2021-05-21 DIAGNOSIS — G62.9 NEUROPATHY: Primary | ICD-10-CM

## 2021-05-21 DIAGNOSIS — M54.12 CERVICAL RADICULOPATHY: ICD-10-CM

## 2021-05-21 DIAGNOSIS — M54.2 NECK PAIN: ICD-10-CM

## 2021-05-21 PROCEDURE — 97110 THERAPEUTIC EXERCISES: CPT | Performed by: PHYSICAL THERAPIST

## 2021-05-21 PROCEDURE — 97112 NEUROMUSCULAR REEDUCATION: CPT | Performed by: PHYSICAL THERAPIST

## 2021-05-21 NOTE — PROGRESS NOTES
Daily Note     Today's date: 2021  Patient name: Kayode Webster IV  : 1990  MRN: [id number]  Referring provider: Lizz Lee DO  Dx:   Encounter Diagnosis     ICD-10-CM    1  Neuropathy  G62 9    2  Cervical radiculopathy  M54 12    3  Neck pain  M54 2                   Subjective: Patient report improvement in neck symptoms since last visit  Objective: See treatment diary below      Assessment:  Patient tolerated thoracic spine mobility exercises well  Significant time spent educating patient on updated HEP to address thoracic spine mobility  Based on patient's response to therapy he will follow up in 1 week to assess his ability to manage symptoms with an HEP  Plan: Continue per plan of care        Precautions: hx of AS      Manuals    SOR TB 4 min Tb 6 min TB 6 min TB 6 min    Upper trap release TB 4 min TB 6 min TB 6 min B/L TB 6 min B/L    Mid thoracic PA mob    TB 3 min; gr  V            Neuro Re-Ed        Cervical retraction 20x supine; HEP 20x  20x 20x supine 20x   Scapular retraction 20x; HEP 20x; B/L retraction otb w/ cervical retraction  20x; B/L retraction otb w/ cervical retraction 20x; B/L retraction otb w/ cervical retraction   Prone periscapular   4x10; T w/ cervical retraction     Thoracic extension MET    5x5s    Scaption against TB   4x10; w/ cervical retraction 20x; w/ cervical retraction otb            Education 10 min HEP and POC 15 min; HEP   15 min; HEP   Ther Ex        UBE  6 min retro 6 min retro 6 min retro 6 min retro   Levator scap stretch 3x30s HEP       Thoracic spine mobility  2 min; 1 pillow folded 2 min 2 min; 1 pillow folded 2 min; 1 pillow folded   Seated thoracic extension  20x; w/ 1/2 foam      Sidelying thoracic rotation     15x ea   Kneeling B/L lat stretch     3x30s                   Ther Activity                        Gait Training                        Modalities

## 2021-05-26 ENCOUNTER — APPOINTMENT (OUTPATIENT)
Dept: PHYSICAL THERAPY | Facility: REHABILITATION | Age: 31
End: 2021-05-26
Payer: COMMERCIAL

## 2021-05-28 ENCOUNTER — OFFICE VISIT (OUTPATIENT)
Dept: PHYSICAL THERAPY | Facility: REHABILITATION | Age: 31
End: 2021-05-28
Payer: COMMERCIAL

## 2021-05-28 DIAGNOSIS — M54.2 NECK PAIN: ICD-10-CM

## 2021-05-28 DIAGNOSIS — M54.12 CERVICAL RADICULOPATHY: ICD-10-CM

## 2021-05-28 DIAGNOSIS — G62.9 NEUROPATHY: Primary | ICD-10-CM

## 2021-05-28 PROCEDURE — 97112 NEUROMUSCULAR REEDUCATION: CPT | Performed by: PHYSICAL THERAPIST

## 2021-05-28 PROCEDURE — 97140 MANUAL THERAPY 1/> REGIONS: CPT | Performed by: PHYSICAL THERAPIST

## 2021-05-28 NOTE — PROGRESS NOTES
Daily Note     Today's date: 2021  Patient name: Marina Carvalho IV  : 1990  MRN: [id number]  Referring provider: Joanne Cain DO  Dx:   Encounter Diagnosis     ICD-10-CM    1  Neuropathy  G62 9    2  Cervical radiculopathy  M54 12    3  Neck pain  M54 2                   Subjective: Patient report improvement in neck symptoms since last visit  Objective: See treatment diary below    Cervical Spine ROM:   Flexion: WNL; "tightness"  Extension: WNL  SB: WNL; B/L upper trap "tightness"  Rotation: R 70 deg    L 70 deg  Cervical retraction: WNL    Thoracic Spine ROM:   Flexion: WNL  Extension: 50% limited      Special Tests:   Spurling A: neg B/L  DNF endurance: 34s    Assessment:  Marina Carvalho IV has been compliant with attending PT and home exercise program since initial eval   All Patterson  has made improvements in objective data since initial evalulation and has achieved all goals  Patient reports having returned to their prior level or function  Patient provided with updated Home Exercise Program, all questions answered, verbalized understanding and agreement to plan of care  Thus it was mutually decided to discontinue this episode of care and transition to Home Exercise Program       Goals  STG  Patient will be independent with home exercise program in 1-2 visits  Patient will report 50% improvement in neck pain at the end of the day in 3 weeks  Patient will achieve full cervical AROM in 3 weeks  LTG  Patient will be independent in comprehensive HEP upon discharge  Complete  Patient will achieve 20s DNF endurance test upon discharge  Complete  Patient will report no neck pain with 8 hour work day upon discharge  Complete  Patient will achieve goal FOTO score upon discharge  Complete    Plan: D/C to HEP       Precautions: hx of AS      Manuals    SOR TB 4 min Tb 6 min TB 6 min TB 6 min     Upper trap release TB 4 min TB 6 min TB 6 min B/L TB 6 min B/L     Mid thoracic PA mob    TB 3 min; gr  V           15 min; Objective measures   Neuro Re-Ed         Cervical retraction 20x supine; HEP 20x  20x 20x supine 20x    Scapular retraction 20x; HEP 20x; B/L retraction otb w/ cervical retraction  20x; B/L retraction otb w/ cervical retraction 20x; B/L retraction otb w/ cervical retraction    Prone periscapular   4x10; T w/ cervical retraction      Thoracic extension MET    5x5s     Scaption against TB   4x10; w/ cervical retraction 20x; w/ cervical retraction otb              Education 10 min HEP and POC 15 min; HEP   15 min; HEP 25 min; D/C instructions and education   Ther Ex         UBE  6 min retro 6 min retro 6 min retro 6 min retro    Levator scap stretch 3x30s HEP        Thoracic spine mobility  2 min; 1 pillow folded 2 min 2 min; 1 pillow folded 2 min; 1 pillow folded    Seated thoracic extension  20x; w/ 1/2 foam       Sidelying thoracic rotation     15x ea    Kneeling B/L lat stretch     3x30s                      Ther Activity                           Gait Training                           Modalities

## 2021-08-05 ENCOUNTER — OFFICE VISIT (OUTPATIENT)
Dept: INTERNAL MEDICINE CLINIC | Age: 31
End: 2021-08-05
Payer: COMMERCIAL

## 2021-08-05 VITALS
WEIGHT: 315 LBS | DIASTOLIC BLOOD PRESSURE: 62 MMHG | OXYGEN SATURATION: 97 % | BODY MASS INDEX: 44.1 KG/M2 | TEMPERATURE: 98.2 F | SYSTOLIC BLOOD PRESSURE: 118 MMHG | HEART RATE: 76 BPM | HEIGHT: 71 IN

## 2021-08-05 DIAGNOSIS — M54.42 CHRONIC MIDLINE LOW BACK PAIN WITH BILATERAL SCIATICA: ICD-10-CM

## 2021-08-05 DIAGNOSIS — M54.2 NECK PAIN: Primary | ICD-10-CM

## 2021-08-05 DIAGNOSIS — M54.41 CHRONIC MIDLINE LOW BACK PAIN WITH BILATERAL SCIATICA: ICD-10-CM

## 2021-08-05 DIAGNOSIS — G89.29 CHRONIC MIDLINE LOW BACK PAIN WITH BILATERAL SCIATICA: ICD-10-CM

## 2021-08-05 DIAGNOSIS — Z00.00 ANNUAL PHYSICAL EXAM: ICD-10-CM

## 2021-08-05 DIAGNOSIS — M62.838 MUSCLE SPASM: ICD-10-CM

## 2021-08-05 PROCEDURE — 99214 OFFICE O/P EST MOD 30 MIN: CPT | Performed by: INTERNAL MEDICINE

## 2021-08-05 PROCEDURE — 3008F BODY MASS INDEX DOCD: CPT | Performed by: INTERNAL MEDICINE

## 2021-08-05 PROCEDURE — 4004F PT TOBACCO SCREEN RCVD TLK: CPT | Performed by: INTERNAL MEDICINE

## 2021-08-05 NOTE — PROGRESS NOTES
Assessment/Plan:    Neck pain  -almost completely resolved  -continue with exercises as taught by physical therapy  -continue using standing work desk    Muscle spasm  -resolved  -will continue to monitor patient clinically  -continue with exercises per physical therapy and continue using standing work desk to reduce muscle strain    Back pain  -resolved  -will continue to monitor patient clinically especially with his history of ankylosing spondylitis    BMI 44 20  -patient was counseled on diet and exercise and calorie counting  -will refer patient to both nutrition and weight management    Need for annual physical exam  -will order CBC, CMP, TSH, lipid panel  -patient should return in about 1-3 months for an annual physical exam     Diagnoses and all orders for this visit:    Neck pain    Muscle spasm    Chronic midline low back pain with bilateral sciatica    BMI 40 0-44 9, adult ([facility] [address] )  -     Ambulatory referral to Weight Management; Future  -     Ambulatory referral to Nutrition Services; Future    Annual physical exam  -     CBC and differential; Future  -     TSH, 3rd generation with Free T4 reflex; Future  -     Comprehensive metabolic panel; Future  -     Lipid panel; Future          Subjective:      Patient ID: Jace Thayer is a 31 y o  male  HPI   Patient presents for a follow-up visit regarding his neck pain, back pain, muscle spasm and also his weight  He states that the neck pain is almost completely resolved  Currently neck pain is a 1/10 and sometimes it is a 0/10  He states that he went for physical therapy and it really helped this time because they concentrated on his neck and they taught him some exercises which he has been doing at home  He states that pain still gets a little bit worse towards the end of the day  He has been using his standing work desk which helps also    He states that he has not had to use his naproxen in over a month and also has not needed his Flexeril because the pain has been so well controlled  With regards to his weight,  His BMI is 44 20 and he states that he would like to work towards losing weight  He has tried dieting in the past but has not been very successful  Of note, he does not calorie count  He would like a referral to Nutrition or weight management  Of note, he denies fever, chills, night sweats, chest pain, shortness of breath, palpitations, nausea, vomiting abdominal pain, diarrhea, constipation  He still smokes a few cigars a week  Of note, he got his 2 COVID vaccines and finished in April 2021  States that he took [employer] vaccine and that he had mild side effects after the 2nd 1  He he had some muscle ache and fatigue and felt as though he was run over by a truck the next day  Symptoms resolved in a little over 24 hours  The following portions of the patient's history were reviewed and updated as appropriate:   He  has a past medical history of Acute torn meniscus of knee, unspecified laterality, initial encounter and Panic attack  He   Patient Active Problem List    Diagnosis Date Noted    Muscle spasm 05/05/2021    Cervical radiculopathy 04/07/2021    Upper back pain, chronic 03/24/2021    Neck pain 03/24/2021    Neuropathy 03/24/2021    BMI 40 0-44 9, adult ([place] [id number] ) 03/24/2021    Ankylosing spondylitis ([place] [address] ) 09/23/2020    Dyslipidemia 09/23/2020    Morbid obesity ([facility] ) 09/02/2020    Family history of ankylosing spondylitis 09/02/2020    Chronic low back pain with bilateral sciatica 09/02/2020    Generalized anxiety disorder 09/02/2020    Panic attacks 09/02/2020    Nicotine dependence-cigars 09/02/2020    Family history of premature coronary artery disease 09/02/2020     He  has a past surgical history that includes Adenoidectomy  His family history includes Alcohol abuse in his father; Ankylosing spondylitis in his father;  Anxiety disorder in his mother; Bipolar disorder in his father; Cancer in his paternal grandfather; Coronary artery disease in his maternal grandfather and maternal grandmother; Depression in his father, mother, and sister; Diabetes in his maternal grandmother; Drug abuse in his father; Heart attack in his maternal grandfather and maternal grandmother; Hypertension in his maternal grandfather and maternal grandmother; Substance Abuse in his father and mother  He  reports that he has been smoking cigars  He has smoked for the past 7 00 years  He has never used smokeless tobacco  He reports current alcohol use  He reports current drug use  Drug: Marijuana  Current Outpatient Medications   Medication Sig Dispense Refill    cyclobenzaprine (FLEXERIL) 10 mg tablet Take 1 tablet (10 mg total) by mouth daily at bedtime as needed for muscle spasms (Patient not taking: Reported on 8/5/2021) 30 tablet 2    naproxen (NAPROSYN) 500 mg tablet Take 1 tablet (500 mg total) by mouth 2 (two) times a day as needed for mild pain or moderate pain Always with meals (Patient not taking: Reported on 8/5/2021) 60 tablet 1     No current facility-administered medications for this visit  Current Outpatient Medications on File Prior to Visit   Medication Sig    cyclobenzaprine (FLEXERIL) 10 mg tablet Take 1 tablet (10 mg total) by mouth daily at bedtime as needed for muscle spasms (Patient not taking: Reported on 8/5/2021)    naproxen (NAPROSYN) 500 mg tablet Take 1 tablet (500 mg total) by mouth 2 (two) times a day as needed for mild pain or moderate pain Always with meals (Patient not taking: Reported on 8/5/2021)     No current facility-administered medications on file prior to visit  He is allergic to codeine and penicillins       Review of Systems   Constitutional: Negative for activity change, chills, fatigue, fever and unexpected weight change  HENT: Negative for ear pain, postnasal drip, rhinorrhea, sinus pressure and sore throat  Eyes: Negative for pain     Respiratory: Negative for cough, choking, chest tightness, shortness of breath and wheezing  Cardiovascular: Negative for chest pain, palpitations and leg swelling  Gastrointestinal: Negative for abdominal pain, constipation, diarrhea, nausea and vomiting  Genitourinary: Negative for dysuria and hematuria  Musculoskeletal: Positive for neck pain (much improved and sometimes a 0/10 and currently 1/10)  Negative for arthralgias, back pain (almost completely resolved), gait problem, joint swelling, myalgias and neck stiffness  Skin: Negative for pallor and rash  Neurological: Negative for dizziness, tremors, seizures, syncope, light-headedness and headaches  Hematological: Negative for adenopathy  Psychiatric/Behavioral: Negative for behavioral problems  Objective:      /62 (BP Location: Left arm, Patient Position: Sitting, Cuff Size: Large)   Pulse 76   Temp 98 2 °F (36 8 °C) (Temporal)   Ht 5' 11 46" (1 815 m)   Wt (!) 146 kg (321 lb)   SpO2 97%   BMI 44 20 kg/m²          Physical Exam  Constitutional:       General: He is not in acute distress  Appearance: He is well-developed  He is obese  He is not diaphoretic  Comments:  BMI is 44 20   HENT:      Head: Normocephalic and atraumatic  Right Ear: External ear normal       Left Ear: External ear normal       Nose: Nose normal       Mouth/Throat:      Mouth: Mucous membranes are dry  Pharynx: Posterior oropharyngeal erythema (Dry mucous membranes with oropharyngeal erythema) present  No oropharyngeal exudate  Eyes:      General: No scleral icterus  Right eye: No discharge  Left eye: No discharge  Conjunctiva/sclera: Conjunctivae normal       Pupils: Pupils are equal, round, and reactive to light  Neck:      Thyroid: No thyromegaly  Vascular: No JVD  Trachea: No tracheal deviation  Cardiovascular:      Rate and Rhythm: Normal rate and regular rhythm  Heart sounds: Normal heart sounds  No murmur heard  No friction rub  No gallop  Pulmonary:      Effort: Pulmonary effort is normal  No respiratory distress  Breath sounds: Normal breath sounds  No wheezing or rales  Chest:      Chest wall: No tenderness  Abdominal:      General: Bowel sounds are normal  There is no distension  Palpations: Abdomen is soft  There is no mass  Tenderness: There is no abdominal tenderness  There is no guarding or rebound  Musculoskeletal:         General: No tenderness or deformity  Normal range of motion  Cervical back: Normal range of motion and neck supple  No muscular tenderness ( tenderness in the posterior neck is much improved and there is no paraspinal hypertonicity observed today)  Lymphadenopathy:      Cervical: No cervical adenopathy  Skin:     General: Skin is warm and dry  Coloration: Skin is not pale  Findings: No erythema or rash  Neurological:      Mental Status: He is alert and oriented to person, place, and time  Cranial Nerves: No cranial nerve deficit  Motor: No abnormal muscle tone  Coordination: Coordination normal       Deep Tendon Reflexes: Reflexes are normal and symmetric  Psychiatric:         Behavior: Behavior normal            Lab on 02/15/2021   Component Date Value Ref Range Status    Hepatitis B Surface Ag 02/15/2021 Non-reactive  Non-reactive, NonReactive - Confirmed Final    Hepatitis C Ab 02/15/2021 Non-reactive  Non-reactive Final    Hep B C IgM 02/15/2021 Non-reactive  Non-reactive Final    Hep B Core Total Ab 02/15/2021 Non-reactive  Non-reactive Final    QFT Nil 02/15/2021 0 02  0 - 8 0 IU/ml Final    QFT TB1-NIL 02/15/2021 0 00  IU/ml Final    QFT TB2-NIL 02/15/2021 -0 01  IU/ml Final    QFT Mitogen-NIL 02/15/2021 >10 00  IU/ml Final    QFT Final Interpretation 02/15/2021 Negative  Negative Final    No Interferon-gamma response to M  tuberculosis antigens detected  Infection with M  tuberculosis is unlikely    A single negative result does not exclude infection with M  tuberculosis  In patients at high risk for M  tuberculosis infection, a second test should be considered in accordance with the 2017 ATS/IDSA/CDC Clinical Practice Guidelines for Diagnosis of Tuberculosis in Adults and Children  False negative results can be a result of incorrect blood sample collection or handling of the specimen affecting lymphocyte function   CRP 02/15/2021 <3 0  <3 0 mg/L Final    Sed Rate 02/15/2021 10  0 - 14 mm/hour Final    Rheumatoid Factor 02/15/2021 Negative  Negative Final    Cyclic Citrullin Peptide Ab 02/15/2021 6  0 - 19 units Final                              Negative               <20                            Weak positive      20 - 39                            Moderate positive  40 - 59                            Strong positive        >59   Appointment on 09/16/2020   Component Date Value Ref Range Status    HLA B27 09/16/2020 Positive   Final    HLA-B*27 Positive  This patient is positive for HLA-B*27  This procedure rules  out the B*27:06 and 27:09 alleles, which the literature  suggests are not associated with spondyloarthropathies  B27 allele interpretation for all loci based on IMGT/HLA  database version 3 38  This test was developed and its performance characteristics  determined by [facility]   It has not been cleared or approved  by the Food and Drug Administration  [facility] CLIA ID Number [id number]  This test was performed using PCR (Polymerase Chain Reaction)/SSOP  (Sequence Specific Oligonucleotide Probes) technique  SBT (Sequence  Based Typing) and/or SSP (Sequence Specific Primers) may be used as  supplemental methods when necessary  Please contact [facility] Customer  Service at [phone] if you have any questions     Director of [facility]   Dr Enrique Dudley, PhD    WBC 09/16/2020 8 74  4 31 - 10 16 Thousand/uL Final    RBC 09/16/2020 5 08  3 88 - 5 62 Million/uL Final    Hemoglobin 09/16/2020 15 3  12 0 - 17 0 g/dL Final    Hematocrit 09/16/2020 45 8  36 5 - 49 3 % Final    MCV 09/16/2020 90  82 - 98 fL Final    MCH 09/16/2020 30 1  26 8 - 34 3 pg Final    MCHC 09/16/2020 33 4  31 4 - 37 4 g/dL Final    RDW 09/16/2020 12 5  11 6 - 15 1 % Final    MPV 09/16/2020 11 4  8 9 - 12 7 fL Final    Platelets 67/63/6249 248  149 - 390 Thousands/uL Final    nRBC 09/16/2020 0  /100 WBCs Final    Neutrophils Relative 09/16/2020 66  43 - 75 % Final    Immat GRANS % 09/16/2020 0  0 - 2 % Final    Lymphocytes Relative 09/16/2020 25  14 - 44 % Final    Monocytes Relative 09/16/2020 8  4 - 12 % Final    Eosinophils Relative 09/16/2020 1  0 - 6 % Final    Basophils Relative 09/16/2020 0  0 - 1 % Final    Neutrophils Absolute 09/16/2020 5 70  1 85 - 7 62 Thousands/µL Final    Immature Grans Absolute 09/16/2020 0 02  0 00 - 0 20 Thousand/uL Final    Lymphocytes Absolute 09/16/2020 2 21  0 60 - 4 47 Thousands/µL Final    Monocytes Absolute 09/16/2020 0 70  0 17 - 1 22 Thousand/µL Final    Eosinophils Absolute 09/16/2020 0 09  0 00 - 0 61 Thousand/µL Final    Basophils Absolute 09/16/2020 0 02  0 00 - 0 10 Thousands/µL Final    Sodium 09/16/2020 140  136 - 145 mmol/L Final    Potassium 09/16/2020 4 2  3 5 - 5 3 mmol/L Final    Chloride 09/16/2020 108  100 - 108 mmol/L Final    CO2 09/16/2020 26  21 - 32 mmol/L Final    ANION GAP 09/16/2020 6  4 - 13 mmol/L Final    BUN 09/16/2020 18  5 - 25 mg/dL Final    Creatinine 09/16/2020 0 82  0 60 - 1 30 mg/dL Final    Standardized to IDMS reference method    Glucose, Fasting 09/16/2020 95  65 - 99 mg/dL Final    Specimen collection should occur prior to Sulfasalazine administration due to the potential for falsely depressed results  Specimen collection should occur prior to Sulfapyridine administration due to the potential for falsely elevated results      Calcium 09/16/2020 9 0  8 3 - 10 1 mg/dL Final    AST 09/16/2020 23  5 - 45 U/L Final    Specimen collection should occur prior to Sulfasalazine administration due to the potential for falsely depressed results   ALT 09/16/2020 38  12 - 78 U/L Final    Specimen collection should occur prior to Sulfasalazine and/or Sulfapyridine administration due to the potential for falsely depressed results   Alkaline Phosphatase 09/16/2020 57  46 - 116 U/L Final    Total Protein 09/16/2020 7 3  6 4 - 8 2 g/dL Final    Albumin 09/16/2020 4 3  3 5 - 5 0 g/dL Final    Total Bilirubin 09/16/2020 0 95  0 20 - 1 00 mg/dL Final    Use of this assay is not recommended for patients undergoing treatment with eltrombopag due to the potential for falsely elevated results   eGFR 09/16/2020 121  ml/min/1 73sq m Final    Cholesterol 09/16/2020 137  50 - 200 mg/dL Final    Cholesterol:       Desirable         <200 mg/dl       Borderline         200-239 mg/dl       High              >239           Triglycerides 09/16/2020 181* <=150 mg/dL Final    Triglyceride:     Normal          <150 mg/dl     Borderline High 150-199 mg/dl     High            200-499 mg/dl        Very High       >499 mg/dl    Specimen collection should occur prior to N-Acetylcysteine or Metamizole administration due to the potential for falsely depressed results   HDL, Direct 09/16/2020 29* >=40 mg/dL Final    HDL Cholesterol:       Low     <41 mg/dL  Specimen collection should occur prior to Metamizole administration due to the potential for falsley depressed results   LDL Calculated 09/16/2020 72  0 - 100 mg/dL Final    LDL Cholesterol:     Optimal           <100 mg/dl     Near Optimal      100-129 mg/dl     Above Optimal       Borderline High 130-159 mg/dl       High            160-189 mg/dl       Very High       >189 mg/dl         This screening LDL is a calculated result  It does not have the accuracy of the Direct Measured LDL in the monitoring of patients with hyperlipidemia and/or statin therapy  Direct Measure LDL (IBB445) must be ordered separately in these patients      Non-HDL-Chol (CHOL-HDL) 09/16/2020 108  mg/dl Final    TSH 3RD GENERATON 09/16/2020 1 420  0 358 - 3 740 uIU/mL Final    Using supplements with high doses of biotin 20 to more than 300 times greater than the adequate daily intake for adults of 30 mcg/day as established by the [place] [employer], can cause falsely depress results

## 2021-08-15 NOTE — PROGRESS NOTES
Assessment and Plan:   Mr Lelia Lowry is a 77-year-old male with history significant for possible ankylosing spondylitis (based on inflammatory back pain, a positive HLA B27 antigen, a significant response to NSAIDs and a family history of ankylosing spondylitis), who presents for a follow-up      # Ankylosing spondylitis  - Solange Veras presents today for a follow-up of ankylosing spondylitis that was diagnosed based on the above criteria, although there were no inflammatory changes noted on the MRI of his sacroiliac joints  I advised him at this time we will continue to monitor for a recurrence of symptoms that would be typical for the ankylosing spondylitis, but at this time he clinically appears to be doing very well  If he does experience back pain he can still utilize the NSAIDs as needed  If he has any new symptoms I requested he contact me otherwise for monitoring purposes I will see him back in the office in 1 year        Plan:  Diagnoses and all orders for this visit:    Ankylosing spondylitis, unspecified site of spine ([facility] [address] )    HLA B27 (HLA B27 positive)      Activities as tolerated  Exercise: try to maintain a low impact exercise regimen as much as possible  Walk for 30 minutes a day for at least 3 days a week  Continue other medications as prescribed by PCP and other specialists  RTC in 1 year         HPI    INITIAL VISIT NOTE (1/2021):  Mr Dianelys Crow a 77-year-old male with no significant past medical history, who presents for further evaluation of chronic low back pain and a positive HLA B27 antigen  Savannah Haley is referred by Dr Debra Ferraro a rheumatology consult      Patient reports he has experienced intermittent low back pain for a few years, but noticed progressive worsening of his symptoms in August 2020  Savannah Haley was seen by his primary care physician and due to a family history of ankylosing spondylitis in his father had workup done which showed the positive HLA B27 antigen   A CBC, CMP and TSH were normal   An x-ray of his lumbar spine and sacroiliac joints was essentially unrevealing except for minimal lumbar degenerative changes noted   He was started on naproxen 500 mg twice a day and Flexeril 5 mg 3 times a day in September 2020 which has significantly helped with the low back pain and he has not been experiencing it since then  Neftali Baugh then started to experience a stiffness/tightening (as opposed to pain) in his midback region which has been constant and does not improve with the naproxen or Babak Freddie does perform daily stretching exercises which relieves his symptoms to some degree   He reports occasional pain on the outer aspect of his right hip just below his belt line   He denies any joint pains of his hands, wrists, elbows, shoulders, neck, currently of his low back/buttocks region, left hip, knees, ankles or feet   He denies swollen joints   He does experience morning stiffness which affects his entire back and takes about 1 hour to improve but states before starting the NSAIDs the morning stiffness was longer   The low back pain does not wake him up from sleep at night and he reports symptoms improving with activities and worsening with rest   Prior to starting the naproxen and Flexeril he was not taking any over-the-counter pain medications and has not been on any other NSAIDs      He denies fevers, unintentional weight loss, dry eyes, dry mouth, inflammatory eye disease, skin rash, psoriasis or inflammatory bowel disease   Other than the ankylosing spondylitis in his father he denies a family history of autoimmune conditions         2/17/2021:  Patient presents for a follow-up today  We reviewed the x-ray of his thoracic spine and MRI of the sacroiliac joints which were both normal   An ESR, CRP, RF and hepatitis panel were normal   A CCP antibody and Quantiferon are still pending       Patient reports the upper and mid back pain is still present and quite severe    He is not getting any relief with the naproxen 500 mg twice daily for Flexeril as needed  The low back pain has not recurred  He does not report any other new complaints today  8/17/2021:  Patient presents for a follow-up today  After the last office visit he did complete physical therapy for the upper and mid back pain and states that this significantly helped  Currently he is denying any pain in the upper or lower back  He takes the naproxen only infrequently if required and this does help him  No new complaints today  The following portions of the patient's history were reviewed and updated as appropriate: allergies, current medications, past family history, past medical history, past social history, past surgical history and problem list       Review of Systems  Constitutional: Negative for weight change, fevers, chills, night sweats, fatigue  ENT/Mouth: Negative for hearing changes, ear pain, nasal congestion, sinus pain, hoarseness, sore throat, rhinorrhea, swallowing difficulty  Eyes: Negative for pain, redness, discharge, vision changes  Cardiovascular: Negative for chest pain, SOB, palpitations  Respiratory: Negative for cough, sputum, wheezing, dyspnea  Gastrointestinal: Negative for nausea, vomiting, diarrhea, constipation, pain, heartburn  Genitourinary: Negative for dysuria, urinary frequency, hematuria  Musculoskeletal: As per HPI  Skin: Negative for skin rash, color changes  Neuro: Negative for weakness, numbness, tingling, loss of consciousness  Psych: Negative for anxiety, depression  Heme/Lymph: Negative for easy bruising, bleeding, lymphadenopathy          Past Medical History:   Diagnosis Date    Acute torn meniscus of knee, unspecified laterality, initial encounter     Panic attack        Past Surgical History:   Procedure Laterality Date    ADENOIDECTOMY         Social History     Socioeconomic History    Marital status: Single     Spouse name: Not on file    Number of children: Not on file    Years of education: Not on file    Highest education level: Not on file   Occupational History    Not on file   Tobacco Use    Smoking status: Current Some Day Smoker     Years: 7 00     Types: Cigars    Smokeless tobacco: Never Used    Tobacco comment: smokes cigars--about 5 per week/Never smokere   Vaping Use    Vaping Use: Never used   Substance and Sexual Activity    Alcohol use: Yes     Comment: 2-3 drinks per week    Drug use: Yes     Types: Marijuana    Sexual activity: Not on file   Other Topics Concern    Not on file   Social History Narrative    Not on file     Social Determinants of Health     Financial Resource Strain:     Difficulty of Paying Living Expenses:    Food Insecurity:     Worried About Running Out of Food in the Last Year:     Ran Out of Food in the Last Year:    Transportation Needs:     Lack of Transportation (Medical):      Lack of Transportation (Non-Medical):    Physical Activity:     Days of Exercise per Week:     Minutes of Exercise per Session:    Stress:     Feeling of Stress :    Social Connections:     Frequency of Communication with Friends and Family:     Frequency of Social Gatherings with Friends and Family:     Attends Bahai Services:     Active Member of Clubs or Organizations:     Attends Club or Organization Meetings:     Marital Status:    Intimate Partner Violence:     Fear of Current or Ex-Partner:     Emotionally Abused:     Physically Abused:     Sexually Abused:        Family History   Problem Relation Age of Onset    Substance Abuse Mother     Depression Mother     Anxiety disorder Mother     Substance Abuse Father     Ankylosing spondylitis Father     Bipolar disorder Father     Alcohol abuse Father     Depression Father     Drug abuse Father     Cancer Paternal Grandfather     Depression Sister     Heart attack Maternal Grandmother     Coronary artery disease Maternal Grandmother     Diabetes Maternal Grandmother     Hypertension Maternal Grandmother     Heart attack Maternal Grandfather     Coronary artery disease Maternal Grandfather     Hypertension Maternal Grandfather        Allergies   Allergen Reactions    Codeine Other (See Comments)     Unknown    Penicillins Other (See Comments)     Unknown       Current Outpatient Medications:     cyclobenzaprine (FLEXERIL) 10 mg tablet, Take 1 tablet (10 mg total) by mouth daily at bedtime as needed for muscle spasms (Patient not taking: Reported on 8/5/2021), Disp: 30 tablet, Rfl: 2    naproxen (NAPROSYN) 500 mg tablet, Take 1 tablet (500 mg total) by mouth 2 (two) times a day as needed for mild pain or moderate pain Always with meals (Patient not taking: Reported on 8/5/2021), Disp: 60 tablet, Rfl: 1      Objective:    Vitals:    08/17/21 0840   BP: 112/72   Pulse: 80       Physical Exam  General: Well appearing, well nourished, in no distress  Oriented x 3, normal mood and affect  Ambulating without difficulty  Skin: Good turgor, no rash, unusual bruising or prominent lesions  Hair: Normal texture and distribution  Nails: Normal color, no deformities  HEENT:  Head: Normocephalic, atraumatic  Eyes: Conjunctiva clear, sclera non-icteric, EOM intact  Extremities: No amputations or deformities, cyanosis, edema  Neurologic: Alert and oriented  No focal neurological deficits appreciated  Psychiatric: Normal mood and affect  Jerilynn Cooks, M D    Rheumatology

## 2021-08-17 ENCOUNTER — OFFICE VISIT (OUTPATIENT)
Dept: RHEUMATOLOGY | Facility: CLINIC | Age: 31
End: 2021-08-17
Payer: COMMERCIAL

## 2021-08-17 VITALS — SYSTOLIC BLOOD PRESSURE: 112 MMHG | DIASTOLIC BLOOD PRESSURE: 72 MMHG | HEART RATE: 80 BPM

## 2021-08-17 DIAGNOSIS — M45.9 ANKYLOSING SPONDYLITIS, UNSPECIFIED SITE OF SPINE (HCC): Primary | ICD-10-CM

## 2021-08-17 DIAGNOSIS — Z15.89 HLA B27 (HLA B27 POSITIVE): ICD-10-CM

## 2021-08-17 PROCEDURE — 4004F PT TOBACCO SCREEN RCVD TLK: CPT | Performed by: INTERNAL MEDICINE

## 2021-08-17 PROCEDURE — 99213 OFFICE O/P EST LOW 20 MIN: CPT | Performed by: INTERNAL MEDICINE

## 2021-09-09 ENCOUNTER — CONSULT (OUTPATIENT)
Dept: BARIATRICS | Facility: CLINIC | Age: 31
End: 2021-09-09
Payer: COMMERCIAL

## 2021-09-09 VITALS
HEIGHT: 72 IN | SYSTOLIC BLOOD PRESSURE: 134 MMHG | WEIGHT: 315 LBS | HEART RATE: 72 BPM | BODY MASS INDEX: 42.66 KG/M2 | TEMPERATURE: 98.1 F | RESPIRATION RATE: 16 BRPM | DIASTOLIC BLOOD PRESSURE: 66 MMHG

## 2021-09-09 DIAGNOSIS — E78.5 DYSLIPIDEMIA: ICD-10-CM

## 2021-09-09 DIAGNOSIS — R63.5 ABNORMAL WEIGHT GAIN: Primary | ICD-10-CM

## 2021-09-09 PROCEDURE — 99203 OFFICE O/P NEW LOW 30 MIN: CPT | Performed by: PHYSICIAN ASSISTANT

## 2021-09-09 PROCEDURE — 4004F PT TOBACCO SCREEN RCVD TLK: CPT | Performed by: PHYSICIAN ASSISTANT

## 2021-09-09 NOTE — PATIENT INSTRUCTIONS
Goals: Food log (ie ) www myfitnesspal com,sparkpeople  com,loseit com,calorieking  com,etc  baritastic  No sugary beverages  At least 64oz of water daily  Increase physical activity by 10 minutes daily   Gradually increase physical activity to a goal of 5 days per week for 30 minutes of MODERATE intensity PLUS 2 days per week of FULL BODY resistance training  5-10 servings of fruits and vegetables per day and 25-35 grams of dietary fiber per day, gradually increasing  Measure all portions: creamers, sugars, cooking oils/butters, condiments, dressings, etc and log calories   If choosing starch pick whole grain/complex carbs and keep to 1/2-3/4 cup: oatmeal, brown rice, quinoa, whole wheat pasta, potatoes, sweet potato, chickpea noodles, red lentil noodles  Recommend checking lab coverage before having labs drawn  0259-9957 calories  Check coverage of Saxenda/Wegovy

## 2021-09-09 NOTE — PROGRESS NOTES
Assessment/Plan:  -Discussed options of HealthyCORE-Intensive Lifestyle Intervention Program, Very Low Calorie Diet-VLCD, Conservative Program, Aldo-En-Y Gastric Bypass and Vertical Sleeve Gastrectomy and the role of weight loss medications   -Initial weight loss goal of 5-10% weight loss for improved health  -Not interested in bariatric surgery  -did not tolerate SSRIs in the past  Would likely avoid Contrave and Qsymia and phentermine due to anxiety  -Screening labs: as ordered  -Patient is interested in pursuing HealthyCORE-Intensive Lifestyle Intervention Program    +STOP BANG (4/8):  -reviewed risks of undiagnosed/untreated sleep apnea  -Recommended referral to sleep medicine provider for further evaluation   -Patient declined and would like to see if risks improve with weight loss  Dyslipidemia  -continue management with PCP  -lifestyle changes/weight loss    Follow up in approximately 3 months with Non-Surgical Physician/Advanced Practitioner  Goals:  Food log (ie ) www myfitnesspal com,sparkpeople  com,loseit com,calorieking  com,etc  baritastic  No sugary beverages  At least 64oz of water daily  Increase physical activity by 10 minutes daily   Gradually increase physical activity to a goal of 5 days per week for 30 minutes of MODERATE intensity PLUS 2 days per week of FULL BODY resistance training  5-10 servings of fruits and vegetables per day and 25-35 grams of dietary fiber per day, gradually increasing  Measure all portions: creamers, sugars, cooking oils/butters, condiments, dressings, etc and log calories   If choosing starch pick whole grain/complex carbs and keep to 1/2-3/4 cup: oatmeal, brown rice, quinoa, whole wheat pasta, potatoes, sweet potato, chickpea noodles, red lentil noodles  Recommend checking lab coverage before having labs drawn  2747-1129 calories    Diagnoses and all orders for this visit:    Abnormal weight gain  -     Ambulatory referral to Weight Management  -     Insulin, fasting; Future    Dyslipidemia  -     Insulin, fasting; Future    Body mass index 40 0-44 9, adult [facility])        Subjective:   Chief Complaint   Patient presents with    Consult     MWM consult     Patient ID: Gerry Olmos IV  is a 31 y o  male with excess weight/obesity here to pursue weight management  Past Medical History:   Diagnosis Date    Acute torn meniscus of knee, unspecified laterality, initial encounter     Anxiety     Depression     IBS (irritable bowel syndrome)     Panic attack      HPI:  Obesity/Excess Weight:  Severity: Severe  Onset:  Entire life, but worsened in college  Modifiers: Diet and Exercise - works, but hard to sustain  Contributing factors: Poor Food Choices  Associated symptoms: increased joint pain and depression    Goals: 240  Hydration: >90 oz of water per day  Alcohol: not regularly  Exercise: walks his dog 3xday-[employer] 9000 step  Occupation:  [employer]: 4/8     for [employer]- but tries to minimize exposure    The following portions of the patient's history were reviewed and updated as appropriate: allergies, current medications, past family history, past medical history, past social history, past surgical history and problem list     Review of Systems   Cardiovascular: Negative for chest pain and palpitations  Psychiatric/Behavioral: Negative for suicidal ideas (Denies HI)  The patient is nervous/anxious (managed by counseling, elminating caffeine)  Objective:    /66 (BP Location: Left arm, Patient Position: Sitting, Cuff Size: Adult)   Pulse 72   Temp 98 1 °F (36 7 °C) (Tympanic)   Resp 16   Ht 5' 11 5" (1 816 m)   Wt (!) 145 kg (320 lb 4 8 oz)   BMI 44 05 kg/m²     Physical Exam  Vitals and nursing note reviewed  Constitutional   General appearance: Abnormal   well developed and morbidly obese  Pulmonary   Respiratory effort: No increased work of breathing or signs of respiratory distress      Abdomen   Abdomen: Abnormal   The abdomen was obese    Musculoskeletal   Gait and station: Normal     Psychiatric   Orientation to person, place and time: Normal     Affect: appropriate

## 2021-09-13 ENCOUNTER — OFFICE VISIT (OUTPATIENT)
Dept: BARIATRICS | Facility: CLINIC | Age: 31
End: 2021-09-13

## 2021-09-13 VITALS — BODY MASS INDEX: 42.66 KG/M2 | HEIGHT: 72 IN | WEIGHT: 315 LBS

## 2021-09-13 DIAGNOSIS — R63.5 ABNORMAL WEIGHT GAIN: ICD-10-CM

## 2021-09-13 PROCEDURE — RECHECK: Performed by: DIETITIAN, REGISTERED

## 2021-09-13 PROCEDURE — WMPRO12: Performed by: DIETITIAN, REGISTERED

## 2021-09-13 NOTE — PROGRESS NOTES
Weight Management Medical Nutrition Assessment  [facility]   Today's weight is 320 1#   Per dietary recall patient consumes excess calories from grazing throughout the day at work and binge eating at night on leftovers or snacks  His portions at lunch and dinner are larger  Patient stated he now that the binge eating may be associated with his anxiety - it occurs 2-3x a week  Scheduled a [facility] visit with our providers and gave patient referral list of outside providers  We developed low calorie meal plan        Patient seen by Medical Provider in past 6 months:  yes  Requested to schedule appointment with Medical Provider: No      Anthropometric Measurements  Start Weight (#): 320 1#  Current Weight (#): n/a  TBW % Change from start weight:n/a  Ideal Body Weight (#):175#  Goal Weight (#):ST#  LTG; 250#  Not under 300#     Weight Loss History  Previous weight loss attempts: Self Created Diets (Portion Control, Healthy Food Choices, etc )    Food and Nutrition Related History    Working from home for past year     Food Recall  Breakfast:cereal - unmeasured ( raisin bran or life) or eggs / toast   Snack:grazes   Lunch:leftovers same as dinner or  Comprehensive Care Kit   Snack:nuts and trail mix or granolaf  Dinner:2x Lean protein / veggies/ carb- larger unmeasured portions  Snack:2-3 x binge leftover - uncomfortable       Beverages: water/ sweetened ice tea ( stopping)   Volume of beverage intake: 90-100oz    Weekends: Worse, dine - calorically dense choices- larger portions-  Pizza or [place] or Sushi  Cravings: salty   Trouble area of day:between meal snacks     Frequency of Eating out: every 2 days  Food restrictions:none reporteed  Cooking: self   Food Shopping: self    Physical Activity Intake  Activity:Walking go 40 minutes daily   Frequency:daily  Physical limitations/barriers to exercise: none reported    Estimated Needs  Energy  Kevin Bhatt Energy Needs: BMR : 6822 calories   1-2# loss weekly sedentary:  8063-6918 calories             1-2# loss weekly lightly active:7873-0403 calories  Maintenance calories for sedentary activity level: 2942 calories  Protein: gm      (1 2-1 5g/kg IBW)  Fluid: 93oz     (35mL/kg IBW)    Nutrition Diagnosis  Yes;     Overweight/obesity  related to Excess energy intake as evidenced by  BMI more than normative standard for age and sex (obesity-grade III 36+)       Nutrition Intervention    Nutrition Prescription  Calories:7142-1430 calories  Protein:95-120gm  Fluid:93oz    Meal Plan (Vinay/Pro/Carb)  Breakfast: 300-400/20/30  Snack:200/>5/20  Lunch: 500/30/30-45  Snack: 200/>5/20  Dinner: 500/30/30-45  Snack: 2000/>5/20    Nutrition Education:    Healthy Core Manual  Calorie controlled menu  Lean protein food choices  Healthy snack options  Food journaling tips      Nutrition Counseling:  Strategies: meal planning, portion sizes, healthy snack choices, hydration, fiber intake, protein intake, exercise, food journal      Monitoring and Evaluation:  Evaluation criteria:  Energy Intake  Meet protein needs  Maintain adequate hydration  Monitor weekly weight  Meal planning/preparation  Food journal   Decreased portions at mealtimes and snacks  Physical activity     Barriers to learning:none  Readiness to change: Preparation:  (Getting ready to change)   Comprehension: good  Expected Compliance: good

## 2021-09-23 NOTE — PROGRESS NOTES
Weight Management Medical Nutrition Assessment  Solange Veras presented for the 2 week follow-up session with the Healthy [facility] Program   Today's weight is 320 #  Patient has maintained his weight the past 2 weeks  He has had a vacation and a wedding during this timeframe and he stated his goal was to maintain his weight during that period  He has planned his meals/snacks for the week ahead  He began food logging before his vacation and found it helpful to keep a lower calorie range  We reviewed his low calorie meal plan    Body composition completed and results reviewed with patient        Patient seen by Medical Provider in past 6 months:  yes  Requested to schedule appointment with Medical Provider: No        Anthropometric Measurements  Start Weight (#): 320 1#  Current Weight (#): 320 1#  TBW % Change from start weight -:  Ideal Body Weight (#):175#  Goal Weight (#):ST#  LTG; 250#  Not under 300#      Weight Loss History  Previous weight loss attempts: Self Created Diets (Portion Control, Healthy Food Choices, etc )     Food and Nutrition Related History     Working from home for past year      Food Recall  Breakfast:cereal - unmeasured ( raisin bran or life) or eggs  / toast    Snack:grazes   Lunch:leftovers same as dinner or  Sandeep salad Kit   Snack:nuts and trail mix   Dinner:2x Lean protein / veggies/ carb- measured portions  Snack:2-3 x binge leftover - uncomfortable - better         Beverages: water/ sweetened ice tea ( stopping)   Volume of beverage intake: 90-100oz     Weekends: Worse, dine - calorically dense choices- larger portions-  Pizza or [place] or Sushi  Cravings: salty   Trouble area of day:between meal snacks      Frequency of Eating out: every 2 days  Food restrictions:none reporteed  Cooking: self   Food Shopping: self     Physical Activity Intake  Activity:Walking go 40 minutes daily   Frequency:daily  Physical limitations/barriers to exercise: none reported     Estimated Needs  Energy  [place] Energy Needs: BMR : 8439 calories   1-2# loss weekly sedentary:  0313-3160 calories             1-2# loss weekly lightly active:6376-7257 calories  Maintenance calories for sedentary activity level: 2942 calories  Protein: gm      (1 2-1 5g/kg IBW)  Fluid: 93oz     (35mL/kg IBW)     Nutrition Diagnosis  Yes;     Overweight/obesity  related to Excess energy intake as evidenced by  BMI more than normative standard for age and sex (obesity-grade III 36+)     Nutrition Intervention     Nutrition Prescription  Calories:3531-9068 calories and 2400 calories on bike/ walk days   Protein:95-120gm  Fluid:93oz     Meal Plan (Vinay/Pro/Carb)  Breakfast: 300-400/20/30  Snack:200/>5/20  Lunch: 500/30/30-45  Snack: 200/>5/20  Dinner: 500/30/30-45  Snack: 2000/>5/20     Nutrition Education:    Healthy Core Manual  Calorie controlled menu  Lean protein food choices  Healthy snack options  Food journaling tips        Nutrition Counseling:  Strategies: meal planning, portion sizes, healthy snack choices, hydration, fiber intake, protein intake, exercise, food journal        Monitoring and Evaluation:  Evaluation criteria:  Energy Intake  Meet protein needs  Maintain adequate hydration  Monitor weekly weight  Meal planning/preparation  Food journal   Decreased portions at mealtimes and snacks  Physical activity      Barriers to learning:none  Readiness to change: Preparation:  (Getting ready to change)   Comprehension: good  Expected Compliance: good

## 2021-09-27 ENCOUNTER — OFFICE VISIT (OUTPATIENT)
Dept: BARIATRICS | Facility: CLINIC | Age: 31
End: 2021-09-27

## 2021-09-27 VITALS — BODY MASS INDEX: 44.1 KG/M2 | WEIGHT: 315 LBS | HEIGHT: 71 IN

## 2021-09-27 DIAGNOSIS — R63.5 ABNORMAL WEIGHT GAIN: Primary | ICD-10-CM

## 2021-09-27 PROCEDURE — RECHECK: Performed by: DIETITIAN, REGISTERED

## 2021-09-30 ENCOUNTER — CLINICAL SUPPORT (OUTPATIENT)
Dept: BARIATRICS | Facility: CLINIC | Age: 31
End: 2021-09-30

## 2021-09-30 VITALS — HEIGHT: 71 IN | WEIGHT: 315 LBS | BODY MASS INDEX: 44.1 KG/M2

## 2021-09-30 DIAGNOSIS — R63.5 ABNORMAL WEIGHT GAIN: Primary | ICD-10-CM

## 2021-09-30 PROCEDURE — RECHECK

## 2021-09-30 PROCEDURE — 3008F BODY MASS INDEX DOCD: CPT | Performed by: PHYSICIAN ASSISTANT

## 2021-10-07 ENCOUNTER — CLINICAL SUPPORT (OUTPATIENT)
Dept: BARIATRICS | Facility: CLINIC | Age: 31
End: 2021-10-07

## 2021-10-07 VITALS — HEIGHT: 71 IN | BODY MASS INDEX: 44.1 KG/M2 | WEIGHT: 315 LBS

## 2021-10-07 DIAGNOSIS — R63.5 ABNORMAL WEIGHT GAIN: Primary | ICD-10-CM

## 2021-10-07 PROCEDURE — RECHECK

## 2021-10-21 ENCOUNTER — CLINICAL SUPPORT (OUTPATIENT)
Dept: BARIATRICS | Facility: CLINIC | Age: 31
End: 2021-10-21

## 2021-10-21 VITALS — WEIGHT: 315 LBS | HEIGHT: 71 IN | BODY MASS INDEX: 44.1 KG/M2

## 2021-10-21 DIAGNOSIS — R63.5 ABNORMAL WEIGHT GAIN: Primary | ICD-10-CM

## 2021-10-21 PROCEDURE — RECHECK

## 2021-10-21 PROCEDURE — 3008F BODY MASS INDEX DOCD: CPT | Performed by: PHYSICIAN ASSISTANT

## 2021-11-19 ENCOUNTER — AMB VIDEO VISIT (OUTPATIENT)
Dept: OTHER | Facility: HOSPITAL | Age: 31
End: 2021-11-19

## 2021-11-19 DIAGNOSIS — N30.00 ACUTE CYSTITIS WITHOUT HEMATURIA: Primary | ICD-10-CM

## 2021-11-19 PROCEDURE — ECARE PR SL URGENT CARE VIRTUAL VISIT: Performed by: PHYSICIAN ASSISTANT

## 2021-11-19 RX ORDER — PHENAZOPYRIDINE HYDROCHLORIDE 200 MG/1
200 TABLET, FILM COATED ORAL 2 TIMES DAILY PRN
Qty: 6 TABLET | Refills: 0 | Status: SHIPPED | OUTPATIENT
Start: 2021-11-19 | End: 2022-02-09 | Stop reason: ALTCHOICE

## 2021-11-19 RX ORDER — SULFAMETHOXAZOLE AND TRIMETHOPRIM 800; 160 MG/1; MG/1
1 TABLET ORAL EVERY 12 HOURS SCHEDULED
Qty: 14 TABLET | Refills: 0 | Status: SHIPPED | OUTPATIENT
Start: 2021-11-19 | End: 2021-11-26

## 2022-02-09 ENCOUNTER — OFFICE VISIT (OUTPATIENT)
Dept: INTERNAL MEDICINE CLINIC | Age: 32
End: 2022-02-09
Payer: COMMERCIAL

## 2022-02-09 VITALS
WEIGHT: 315 LBS | HEIGHT: 71 IN | SYSTOLIC BLOOD PRESSURE: 130 MMHG | TEMPERATURE: 98.1 F | BODY MASS INDEX: 44.1 KG/M2 | OXYGEN SATURATION: 97 % | DIASTOLIC BLOOD PRESSURE: 74 MMHG | HEART RATE: 80 BPM

## 2022-02-09 DIAGNOSIS — R42 DIZZINESS: ICD-10-CM

## 2022-02-09 DIAGNOSIS — G89.29 CHRONIC MIDLINE LOW BACK PAIN WITH BILATERAL SCIATICA: ICD-10-CM

## 2022-02-09 DIAGNOSIS — M45.9 ANKYLOSING SPONDYLITIS, UNSPECIFIED SITE OF SPINE (HCC): ICD-10-CM

## 2022-02-09 DIAGNOSIS — M54.41 CHRONIC MIDLINE LOW BACK PAIN WITH BILATERAL SCIATICA: ICD-10-CM

## 2022-02-09 DIAGNOSIS — G47.33 OBSTRUCTIVE SLEEP APNEA: ICD-10-CM

## 2022-02-09 DIAGNOSIS — R25.1 SHAKINESS: ICD-10-CM

## 2022-02-09 DIAGNOSIS — M54.42 CHRONIC MIDLINE LOW BACK PAIN WITH BILATERAL SCIATICA: ICD-10-CM

## 2022-02-09 DIAGNOSIS — F41.1 GENERALIZED ANXIETY DISORDER: ICD-10-CM

## 2022-02-09 DIAGNOSIS — Z00.00 ANNUAL PHYSICAL EXAM: Primary | ICD-10-CM

## 2022-02-09 DIAGNOSIS — E66.01 OBESITY, MORBID, BMI 40.0-49.9 (HCC): ICD-10-CM

## 2022-02-09 PROCEDURE — 3008F BODY MASS INDEX DOCD: CPT | Performed by: INTERNAL MEDICINE

## 2022-02-09 PROCEDURE — 99214 OFFICE O/P EST MOD 30 MIN: CPT | Performed by: INTERNAL MEDICINE

## 2022-02-09 PROCEDURE — 4004F PT TOBACCO SCREEN RCVD TLK: CPT | Performed by: INTERNAL MEDICINE

## 2022-02-09 PROCEDURE — 99395 PREV VISIT EST AGE 18-39: CPT | Performed by: INTERNAL MEDICINE

## 2022-02-09 RX ORDER — HYDROXYZINE 50 MG/1
50 TABLET, FILM COATED ORAL 3 TIMES DAILY PRN
Qty: 30 TABLET | Refills: 0 | Status: SHIPPED | OUTPATIENT
Start: 2022-02-09 | End: 2022-04-06 | Stop reason: SDUPTHER

## 2022-02-09 NOTE — PROGRESS NOTES
Assessment/Plan:    Annual physical exam   - History and physical examination done  - Pt was counseled to eat a heart healthy diet, to drink at least 2 L of water daily, to take a daily multivitamin and to exercise for at least 30 minutes of cardio exercise daily, for at least 5 days a week  - CBC, CMP, TSH and lipid panel have been ordered and we will follow up with the results   -is up-to-date with his influenza vaccine and his COVID vaccine but not his tetanus vaccine  - testicular self-exam was discussed  - follow up in 2-3 months          Diagnoses and all orders for this visit:    Annual physical exam  -     CBC and differential; Future  -     TSH, 3rd generation with Free T4 reflex; Future  -     Comprehensive metabolic panel; Future  -     Lipid panel; Future    Generalized anxiety disorder  -     hydrOXYzine HCL (ATARAX) 50 mg tablet; Take 1 tablet (50 mg total) by mouth 3 (three) times a day as needed for anxiety    Shakiness  -     CBC and differential; Future  -     TSH, 3rd generation with Free T4 reflex; Future  -     Comprehensive metabolic panel; Future    Dizziness, intermittent    Ankylosing spondylitis, unspecified site of spine ([facility] [address] )    Obstructive sleep apnea  -     Home Study; Future          Subjective:      Patient ID: Ilsa Almanza is a 36 y o  male  HPI  Patient presents for an annual physical exam     Last annual physical exam- 09/02/2020    Past medical history- anxiety and depression, started at age 17  Seasonal allergies and chronic back pain  Past surgical history-  adenoidectomy and ? tonsillectomy    Medications- multivitamins    Allergies-Penicillin,- ? 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 Aunt,   early onset heart disease with MI - both maternal grandparents, dad's dad, lung cancer - grandma(a smoker),   ankylosing spondylitis - dad, dad is in a wheel chair,   substance abuse - dad( states that he abuses everything)    Today, patient also states that he has been having dizziness/shakiness for a month  He states that he feels jittery, on and off and sometimes goes for days without having the symptoms  He wondered at the point if he has diabetes  He states that the symptoms improve with eating but have not occurred in a week  Shakiness has also happened at times when he was not hungry  He states that he has also been feeling anxious  He has a history of anxiety and depression that is chronic  He states that he sees a counselor for his anxiety  He believes that his psychotherapy is working  He is not interested in taking a daily medication for his anxiety because he does not have it everyday  Of note, he states that he has tried ssris in the past and did not like the side effects  He believes that they actually made his depression worse    Occasionally he has panic attacks, about 3-4 times a month and he states they are debilitating with a sensation of severe emotional overload with associated palpitations, shortness of breath, occasional feelings of weakness and inability to stand, normally he would cry for a while and then it resolves  It is associated with continuing feeling of hightened stress even after the panic attack is over with associated insomnia and feeling off for a day or two  He has been seeing his counselor for 8 months and it is helping  Of note he states that he used to smoke marijuana but has not smoked or drank really in 2 months  Used to drink on weekends, max of 2-3 drinks a day  Smokes cigars once in a while at one to two a week and used smoke 3-4 cigar a day  He has also cut out caffeine, he only drinks only decaf coffee  Of note, he states that his Dad was a narcotic addict and both parents were alcholics  First time he felt shaky, his hands started shaking and he thought it was 2/2 to his anxiety  He states that his back pain has improved a great deal but admits to insomnia, feelings of anxiety, but denies fever, chills, night sweats, chest pain, headache, nasal congestion, sore throat, nausea, vomiting abdominal pain, diarrhea, constipation, myalgias, arthralgias  He states that he snores  The following portions of the patient's history were reviewed and updated as appropriate:   He  has a past medical history of Acute torn meniscus of knee, unspecified laterality, initial encounter, Anxiety, Depression, IBS (irritable bowel syndrome), and Panic attack    He   Patient Active Problem List    Diagnosis Date Noted    Shakiness 02/09/2022    Dizziness, intermittent 02/09/2022    Obstructive sleep apnea 02/09/2022    Muscle spasm 05/05/2021    Cervical radiculopathy 04/07/2021    Upper back pain, chronic 03/24/2021    Neck pain 03/24/2021    Neuropathy 03/24/2021    BMI 40 0-44 9, adult ([facility] ) 03/24/2021    Ankylosing spondylitis ([facility] ) 09/23/2020    Dyslipidemia 09/23/2020    Morbid obesity ([facility] ) 09/02/2020    Annual physical exam 09/02/2020    Family history of ankylosing spondylitis 09/02/2020    Chronic low back pain with bilateral sciatica 09/02/2020    Generalized anxiety disorder 09/02/2020    Panic attacks 09/02/2020    Nicotine dependence-cigars 09/02/2020    Family history of premature coronary artery disease 09/02/2020     He  has a past surgical history that includes Adenoidectomy  His family history includes Alcohol abuse in his father; Ankylosing spondylitis in his father; Anxiety disorder in his mother; Bipolar disorder in his father; Cancer in his paternal grandfather; Coronary artery disease in his maternal grandfather and maternal grandmother; Depression in his father, mother, and sister; Diabetes in his maternal grandmother; Drug abuse in his father; Heart attack in his maternal grandfather and maternal grandmother; Hypertension in his maternal grandfather and maternal grandmother; Lung cancer in his paternal grandmother; Substance Abuse in his father and mother  He  reports that he has been smoking cigars  He has smoked for the past 7 00 years  He has never used smokeless tobacco  He reports previous alcohol use  He reports previous drug use  Drug: Marijuana  Current Outpatient Medications   Medication Sig Dispense Refill    hydrOXYzine HCL (ATARAX) 50 mg tablet Take 1 tablet (50 mg total) by mouth 3 (three) times a day as needed for anxiety 30 tablet 0     No current facility-administered medications for this visit  Current Outpatient Medications on File Prior to Visit   Medication Sig    [DISCONTINUED] phenazopyridine (PYRIDIUM) 200 mg tablet Take 1 tablet (200 mg total) by mouth 2 (two) times a day as needed for bladder spasms (Patient not taking: Reported on 2/9/2022 )     No current facility-administered medications on file prior to visit  He is allergic to codeine and penicillins       Review of Systems   Constitutional: Negative for activity change, chills, fatigue, fever and unexpected weight change     HENT: Negative for ear pain, postnasal drip, rhinorrhea, sinus pressure and sore throat  Eyes: Negative for pain  Respiratory: Positive for shortness of breath (with panic attacks and anxiety)  Negative for cough, choking, chest tightness and wheezing  Cardiovascular: Positive for palpitations (occasionally)  Negative for chest pain and leg swelling  Gastrointestinal: Negative for abdominal pain, constipation, diarrhea, nausea and vomiting  Genitourinary: Negative for dysuria and hematuria  Musculoskeletal: Positive for back pain (much better)  Negative for arthralgias, gait problem, joint swelling, myalgias and neck stiffness  Skin: Negative for pallor and rash  Neurological: Positive for dizziness (and shakiness on and off)  Negative for tremors, seizures, syncope, light-headedness and headaches  Hematological: Negative for adenopathy  Psychiatric/Behavioral: Positive for dysphoric mood and sleep disturbance (feels very stressed, wakes up in a panic attack)  Negative for behavioral problems  The patient is nervous/anxious  Objective:      /74 (BP Location: Left arm, Patient Position: Sitting, Cuff Size: Large)   Pulse 80   Temp 98 1 °F (36 7 °C)   Ht 5' 11" (1 803 m)   Wt (!) 156 kg (343 lb)   SpO2 97% Comment: room air  BMI 47 84 kg/m²          Physical Exam  Constitutional:       General: He is not in acute distress  Appearance: He is well-developed  He is not diaphoretic  HENT:      Head: Normocephalic and atraumatic  Right Ear: External ear normal       Left Ear: External ear normal       Nose: Septal deviation ( septal deviation to the left) and congestion present  Mouth/Throat:      Pharynx: Posterior oropharyngeal erythema ( oropharyngeal erythema with Mallampati class 4 oropharynx) present  No oropharyngeal exudate  Eyes:      General: No scleral icterus  Right eye: No discharge  Left eye: No discharge        Conjunctiva/sclera: Conjunctivae normal  Pupils: Pupils are equal, round, and reactive to light  Neck:      Thyroid: No thyromegaly  Vascular: No JVD  Trachea: No tracheal deviation  Comments: Neck circumference is 19 inches  Cardiovascular:      Rate and Rhythm: Normal rate and regular rhythm  Heart sounds: Normal heart sounds  No murmur heard  No friction rub  No gallop  Pulmonary:      Effort: Pulmonary effort is normal  No respiratory distress  Breath sounds: Normal breath sounds  No wheezing or rales  Chest:      Chest wall: No tenderness  Abdominal:      General: Bowel sounds are normal  There is no distension  Palpations: Abdomen is soft  There is no mass  Tenderness: There is no abdominal tenderness  There is no guarding or rebound  Musculoskeletal:         General: No tenderness or deformity  Normal range of motion  Cervical back: Normal range of motion and neck supple  Lymphadenopathy:      Cervical: No cervical adenopathy  Skin:     General: Skin is warm and dry  Coloration: Skin is not pale  Findings: No erythema or rash  Neurological:      Mental Status: He is alert and oriented to person, place, and time  Cranial Nerves: No cranial nerve deficit  Motor: No abnormal muscle tone  Coordination: Coordination normal       Deep Tendon Reflexes: Reflexes are normal and symmetric        Comments: Cranial nerves 2-12 are intact bilaterally  Muscle strength is 5/5 in all extremities  Sensation is intact in bilateral face and extremities  Rapid alternating movement and finger-to-nose pointing test intact   Deep tendon reflexes are 2+ bilaterally  Gait is intact       Psychiatric:         Behavior: Behavior normal            Lab on 02/15/2021   Component Date Value Ref Range Status    Hepatitis B Surface Ag 02/15/2021 Non-reactive  Non-reactive, NonReactive - Confirmed Final    Hepatitis C Ab 02/15/2021 Non-reactive  Non-reactive Final    Hep B C IgM 02/15/2021 Non-reactive  Non-reactive Final    Hep B Core Total Ab 02/15/2021 Non-reactive  Non-reactive Final    QFT Nil 02/15/2021 0 02  0 - 8 0 IU/ml Final    QFT TB1-NIL 02/15/2021 0 00  IU/ml Final    QFT TB2-NIL 02/15/2021 -0 01  IU/ml Final    QFT Mitogen-NIL 02/15/2021 >10 00  IU/ml Final    QFT Final Interpretation 02/15/2021 Negative  Negative Final    No Interferon-gamma response to M  tuberculosis antigens detected  Infection with M  tuberculosis is unlikely  A single negative result does not exclude infection with M  tuberculosis  In patients at high risk for M  tuberculosis infection, a second test should be considered in accordance with the 2017 ATS/IDSA/CDC Clinical Practice Guidelines for Diagnosis of Tuberculosis in Adults and Children  False negative results can be a result of incorrect blood sample collection or handling of the specimen affecting lymphocyte function      CRP 02/15/2021 <3 0  <3 0 mg/L Final    Sed Rate 02/15/2021 10  0 - 14 mm/hour Final    Rheumatoid Factor 02/15/2021 Negative  Negative Final    Cyclic Citrullin Peptide Ab 02/15/2021 6  0 - 19 units Final                              Negative               <20                            Weak positive      20 - 39                            Moderate positive  40 - 59                            Strong positive        >59

## 2022-02-09 NOTE — PATIENT INSTRUCTIONS

## 2022-02-09 NOTE — PROGRESS NOTES
Assessment/Plan:      Generalized anxiety disorder occasional panic attacks  -patient is not interested in an SSRI or SNRI  -I discussed buspirone with him as this will likely not have a sexual or weight gain side effects that the SSRIs and SNRIs have   -since patient is not interested in a daily medication, we will try him on p r n  Hydroxyzine  -for now we will avoid benzodiazepines because of his personal and family history  -patient was counseled to monitor himself for adverse reactions and to inform me if they occur  -he was counseled to take the medication at night and at home for the 1st time because it may cause him to be sleepy  Shakiness and dizziness  -possibly secondary to hypoglycemia versus anemia versus thyroid disease versus anxiety  -will follow up with the results CBC, TSH, CMP    Ankylosis spondylitis with chronic low back pain  -back pain is much improved status post physical therapy  -will continue to monitor patient closely clinically    Obstructive sleep apnea  -neck circumference is 19 inches, BMI is 47 84, patient snores and wakes up repeatedly through out the night which is concerning for sleep apnea  -will order a home sleep study       Diagnoses and all orders for this visit:    Annual physical exam  -     CBC and differential; Future  -     TSH, 3rd generation with Free T4 reflex; Future  -     Comprehensive metabolic panel; Future  -     Lipid panel; Future    Generalized anxiety disorder  -     hydrOXYzine HCL (ATARAX) 50 mg tablet; Take 1 tablet (50 mg total) by mouth 3 (three) times a day as needed for anxiety    Shakiness  -     CBC and differential; Future  -     TSH, 3rd generation with Free T4 reflex; Future  -     Comprehensive metabolic panel; Future    Dizziness, intermittent    Ankylosing spondylitis, unspecified site of spine ([facility] )    Obstructive sleep apnea  -     Home Study;  Future    Chronic midline low back pain with bilateral sciatica    Obesity, morbid, BMI 40 0-49 9 ([facility] )          BMI Counseling: Body mass index is 47 84 kg/m²  The BMI is above normal  Nutrition recommendations include limiting drinks that contain sugar, reducing intake of saturated and trans fat and reducing intake of cholesterol  Exercise recommendations include moderate physical activity 150 minutes/week  No pharmacotherapy was ordered  Patient referred to PCP  Rationale for BMI follow-up plan is due to patient being overweight or obese  Subjective:      Patient ID: Lachelle Rebolledo is a 36 y o  male  HPI   Today, patient presents with complaints that he has been having dizziness/shakiness for a month  He states that he feels jittery, on and off and sometimes goes for days without having these symptoms  He wondered at the point if he has diabetes  He states that the symptoms improve with eating but have not occurred in a week  Shakiness has also happened at times when he was not hungry  He states that he has also been feeling anxious  He has a history of anxiety and depression that is chronic  He states that he sees a counselor for his anxiety  He believes that his psychotherapy is working  He is not interested in taking a daily medication for his anxiety because he does not have it everyday  Of note, he states that he has tried ssris in the past and did not like the side effects  He believes that they actually made his depression worse  Occasionally he has panic attacks, about 3-4 times a month and he states they are debilitating with a sensation of severe emotional overload with associated palpitations, shortness of breath, occasional feelings of weakness and inability to stand, normally he would cry for a while and then it resolves  It is associated with continuing feeling of hightened stress even after the panic attack is over with associated insomnia and feeling off for a day or two  He has been seeing his counselor for 8 months and it is helping    Of note he states that he used to smoke marijuana but has not smoked or drank really in 2 months  Used to drink on weekends, max of 2-3 drinks a day  Smokes cigars once in a while at one to two a week and used smoke 3-4 cigar a day  He has also cut out caffeine, he only drinks only decaf coffee  Of note, he states that his Dad was a narcotic addict and both parents were alcholics  First time he felt shaky, his hands started shaking and he thought it was 2/2 to his anxiety  He states that his back pain has improved a great deal but admits to insomnia, feelings of anxiety, but denies fever, chills, night sweats, chest pain, headache, nasal congestion, sore throat, nausea, vomiting abdominal pain, diarrhea, constipation, myalgias, arthralgias  He states that he snores  The following portions of the patient's history were reviewed and updated as appropriate:   He  has a past medical history of Acute torn meniscus of knee, unspecified laterality, initial encounter, Anxiety, Depression, IBS (irritable bowel syndrome), and Panic attack  He   Patient Active Problem List    Diagnosis Date Noted    Shakiness 02/09/2022    Dizziness, intermittent 02/09/2022    Obstructive sleep apnea 02/09/2022    Muscle spasm 05/05/2021    Cervical radiculopathy 04/07/2021    Upper back pain, chronic 03/24/2021    Neck pain 03/24/2021    Neuropathy 03/24/2021    Ankylosing spondylitis ([facility] ) 09/23/2020    Dyslipidemia 09/23/2020    Obesity, morbid, BMI 40 0-49 9 ([facility] ) 09/02/2020    Annual physical exam 09/02/2020    Family history of ankylosing spondylitis 09/02/2020    Chronic low back pain with bilateral sciatica 09/02/2020    Generalized anxiety disorder 09/02/2020    Panic attacks 09/02/2020    Nicotine dependence-cigars 09/02/2020    Family history of premature coronary artery disease 09/02/2020     He  has a past surgical history that includes Adenoidectomy  His family history includes Alcohol abuse in his father;  Ankylosing spondylitis in his father; Anxiety disorder in his mother; Bipolar disorder in his father; Cancer in his paternal grandfather; Coronary artery disease in his maternal grandfather and maternal grandmother; Depression in his father, mother, and sister; Diabetes in his maternal grandmother; Drug abuse in his father; Heart attack in his maternal grandfather and maternal grandmother; Hypertension in his maternal grandfather and maternal grandmother; Lung cancer in his paternal grandmother; Substance Abuse in his father and mother  He  reports that he has been smoking cigars  He has smoked for the past 7 00 years  He has never used smokeless tobacco  He reports previous alcohol use  He reports previous drug use  Drug: Marijuana  Current Outpatient Medications   Medication Sig Dispense Refill    hydrOXYzine HCL (ATARAX) 50 mg tablet Take 1 tablet (50 mg total) by mouth 3 (three) times a day as needed for anxiety 30 tablet 0     No current facility-administered medications for this visit  Current Outpatient Medications on File Prior to Visit   Medication Sig    [DISCONTINUED] phenazopyridine (PYRIDIUM) 200 mg tablet Take 1 tablet (200 mg total) by mouth 2 (two) times a day as needed for bladder spasms (Patient not taking: Reported on 2/9/2022 )     No current facility-administered medications on file prior to visit  He is allergic to codeine and penicillins       Review of Systems   Constitutional: Negative for activity change, chills, fatigue, fever and unexpected weight change  HENT: Negative for ear pain, postnasal drip, rhinorrhea, sinus pressure and sore throat  Eyes: Negative for pain  Respiratory: Positive for shortness of breath  Negative for cough, choking, chest tightness and wheezing  Cardiovascular: Positive for palpitations  Negative for chest pain and leg swelling  Gastrointestinal: Negative for abdominal pain, constipation, diarrhea, nausea and vomiting     Genitourinary: Negative for dysuria and hematuria  Musculoskeletal: Positive for back pain  Negative for arthralgias, gait problem, joint swelling, myalgias and neck stiffness  Skin: Negative for pallor and rash  Neurological: Positive for dizziness  Negative for tremors, seizures, syncope, light-headedness and headaches  Hematological: Negative for adenopathy  Psychiatric/Behavioral: Positive for dysphoric mood and sleep disturbance  Negative for behavioral problems  The patient is nervous/anxious  Objective:      /74 (BP Location: Left arm, Patient Position: Sitting, Cuff Size: Large)   Pulse 80   Temp 98 1 °F (36 7 °C)   Ht 5' 11" (1 803 m)   Wt (!) 156 kg (343 lb)   SpO2 97% Comment: room air  BMI 47 84 kg/m²          Physical Exam  Constitutional:       General: He is not in acute distress  Appearance: He is well-developed  He is obese  He is not diaphoretic  Comments: BMI is 47 84   HENT:      Head: Normocephalic and atraumatic  Right Ear: External ear normal       Left Ear: External ear normal       Nose: Septal deviation (Septal deviation to the left) and congestion present  Mouth/Throat:      Pharynx: Posterior oropharyngeal erythema ( oropharyngeal erythema with Mallampati class 4 oropharynx) present  No oropharyngeal exudate  Eyes:      General: No scleral icterus  Right eye: No discharge  Left eye: No discharge  Conjunctiva/sclera: Conjunctivae normal       Pupils: Pupils are equal, round, and reactive to light  Neck:      Thyroid: No thyromegaly  Vascular: No JVD  Trachea: No tracheal deviation  Comments: Neck circumference is 19 inches  Cardiovascular:      Rate and Rhythm: Normal rate and regular rhythm  Heart sounds: Normal heart sounds  No murmur heard  No friction rub  No gallop  Pulmonary:      Effort: Pulmonary effort is normal  No respiratory distress  Breath sounds: Normal breath sounds  No wheezing or rales     Chest: Chest wall: No tenderness  Abdominal:      General: Bowel sounds are normal  There is no distension  Palpations: Abdomen is soft  There is no mass  Tenderness: There is no abdominal tenderness  There is no guarding or rebound  Musculoskeletal:         General: No tenderness or deformity  Normal range of motion  Cervical back: Normal range of motion and neck supple  No tenderness  Thoracic back: No tenderness  Lumbar back: No tenderness (No tenderness or muscle spasms observed on the cervical, thoracic or lumbar back regions on examination)  Lymphadenopathy:      Cervical: No cervical adenopathy  Skin:     General: Skin is warm and dry  Coloration: Skin is not pale  Findings: No erythema or rash  Neurological:      Mental Status: He is alert and oriented to person, place, and time  Cranial Nerves: No cranial nerve deficit  Motor: No abnormal muscle tone  Coordination: Coordination normal       Deep Tendon Reflexes: Reflexes are normal and symmetric  Psychiatric:         Behavior: Behavior normal            Lab on 02/15/2021   Component Date Value Ref Range Status    Hepatitis B Surface Ag 02/15/2021 Non-reactive  Non-reactive, NonReactive - Confirmed Final    Hepatitis C Ab 02/15/2021 Non-reactive  Non-reactive Final    Hep B C IgM 02/15/2021 Non-reactive  Non-reactive Final    Hep B Core Total Ab 02/15/2021 Non-reactive  Non-reactive Final    QFT Nil 02/15/2021 0 02  0 - 8 0 IU/ml Final    QFT TB1-NIL 02/15/2021 0 00  IU/ml Final    QFT TB2-NIL 02/15/2021 -0 01  IU/ml Final    QFT Mitogen-NIL 02/15/2021 >10 00  IU/ml Final    QFT Final Interpretation 02/15/2021 Negative  Negative Final    No Interferon-gamma response to M  tuberculosis antigens detected  Infection with M  tuberculosis is unlikely  A single negative result does not exclude infection with M  tuberculosis   In patients at high risk for M  tuberculosis infection, a second test should be considered in accordance with the 2017 ATS/IDSA/CDC Clinical Practice Guidelines for Diagnosis of Tuberculosis in Adults and Children  False negative results can be a result of incorrect blood sample collection or handling of the specimen affecting lymphocyte function      CRP 02/15/2021 <3 0  <3 0 mg/L Final    Sed Rate 02/15/2021 10  0 - 14 mm/hour Final    Rheumatoid Factor 02/15/2021 Negative  Negative Final    Cyclic Citrullin Peptide Ab 02/15/2021 6  0 - 19 units Final                              Negative               <20                            Weak positive      20 - 39                            Moderate positive  40 - 59                            Strong positive        >59

## 2022-02-13 ENCOUNTER — APPOINTMENT (OUTPATIENT)
Dept: LAB | Facility: HOSPITAL | Age: 32
End: 2022-02-13
Attending: INTERNAL MEDICINE
Payer: COMMERCIAL

## 2022-02-13 DIAGNOSIS — Z00.00 ANNUAL PHYSICAL EXAM: ICD-10-CM

## 2022-02-13 DIAGNOSIS — R63.5 ABNORMAL WEIGHT GAIN: ICD-10-CM

## 2022-02-13 DIAGNOSIS — R25.1 SHAKINESS: ICD-10-CM

## 2022-02-13 DIAGNOSIS — E78.5 DYSLIPIDEMIA: ICD-10-CM

## 2022-02-13 LAB
ALBUMIN SERPL BCP-MCNC: 3.7 G/DL (ref 3.5–5)
ALP SERPL-CCNC: 68 U/L (ref 46–116)
ALT SERPL W P-5'-P-CCNC: 67 U/L (ref 12–78)
ANION GAP SERPL CALCULATED.3IONS-SCNC: 5 MMOL/L (ref 4–13)
AST SERPL W P-5'-P-CCNC: 36 U/L (ref 5–45)
BASOPHILS # BLD AUTO: 0.03 THOUSANDS/ΜL (ref 0–0.1)
BASOPHILS NFR BLD AUTO: 0 % (ref 0–1)
BILIRUB SERPL-MCNC: 1.17 MG/DL (ref 0.2–1)
BUN SERPL-MCNC: 11 MG/DL (ref 5–25)
CALCIUM SERPL-MCNC: 9.4 MG/DL (ref 8.3–10.1)
CHLORIDE SERPL-SCNC: 111 MMOL/L (ref 100–108)
CHOLEST SERPL-MCNC: 158 MG/DL
CO2 SERPL-SCNC: 25 MMOL/L (ref 21–32)
CREAT SERPL-MCNC: 0.86 MG/DL (ref 0.6–1.3)
EOSINOPHIL # BLD AUTO: 0.08 THOUSAND/ΜL (ref 0–0.61)
EOSINOPHIL NFR BLD AUTO: 1 % (ref 0–6)
ERYTHROCYTE [DISTWIDTH] IN BLOOD BY AUTOMATED COUNT: 13 % (ref 11.6–15.1)
GFR SERPL CREATININE-BSD FRML MDRD: 117 ML/MIN/1.73SQ M
GLUCOSE P FAST SERPL-MCNC: 98 MG/DL (ref 65–99)
HCT VFR BLD AUTO: 46.3 % (ref 36.5–49.3)
HDLC SERPL-MCNC: 33 MG/DL
HGB BLD-MCNC: 15.2 G/DL (ref 12–17)
IMM GRANULOCYTES # BLD AUTO: 0.03 THOUSAND/UL (ref 0–0.2)
IMM GRANULOCYTES NFR BLD AUTO: 0 % (ref 0–2)
INSULIN SERPL-ACNC: 19.8 MU/L (ref 3–25)
LDLC SERPL CALC-MCNC: 101 MG/DL (ref 0–100)
LYMPHOCYTES # BLD AUTO: 2.04 THOUSANDS/ΜL (ref 0.6–4.47)
LYMPHOCYTES NFR BLD AUTO: 23 % (ref 14–44)
MCH RBC QN AUTO: 29.6 PG (ref 26.8–34.3)
MCHC RBC AUTO-ENTMCNC: 32.8 G/DL (ref 31.4–37.4)
MCV RBC AUTO: 90 FL (ref 82–98)
MONOCYTES # BLD AUTO: 0.7 THOUSAND/ΜL (ref 0.17–1.22)
MONOCYTES NFR BLD AUTO: 8 % (ref 4–12)
NEUTROPHILS # BLD AUTO: 5.91 THOUSANDS/ΜL (ref 1.85–7.62)
NEUTS SEG NFR BLD AUTO: 68 % (ref 43–75)
NONHDLC SERPL-MCNC: 125 MG/DL
NRBC BLD AUTO-RTO: 0 /100 WBCS
PLATELET # BLD AUTO: 275 THOUSANDS/UL (ref 149–390)
PMV BLD AUTO: 10.7 FL (ref 8.9–12.7)
POTASSIUM SERPL-SCNC: 4.1 MMOL/L (ref 3.5–5.3)
PROT SERPL-MCNC: 7.7 G/DL (ref 6.4–8.2)
RBC # BLD AUTO: 5.13 MILLION/UL (ref 3.88–5.62)
SODIUM SERPL-SCNC: 141 MMOL/L (ref 136–145)
TRIGL SERPL-MCNC: 119 MG/DL
TSH SERPL DL<=0.05 MIU/L-ACNC: 1.09 UIU/ML (ref 0.36–3.74)
WBC # BLD AUTO: 8.79 THOUSAND/UL (ref 4.31–10.16)

## 2022-02-13 PROCEDURE — 85025 COMPLETE CBC W/AUTO DIFF WBC: CPT

## 2022-02-13 PROCEDURE — 84443 ASSAY THYROID STIM HORMONE: CPT

## 2022-02-13 PROCEDURE — 36415 COLL VENOUS BLD VENIPUNCTURE: CPT

## 2022-02-13 PROCEDURE — 83525 ASSAY OF INSULIN: CPT

## 2022-02-13 PROCEDURE — 80061 LIPID PANEL: CPT

## 2022-02-13 PROCEDURE — 80053 COMPREHEN METABOLIC PANEL: CPT

## 2022-04-06 ENCOUNTER — OFFICE VISIT (OUTPATIENT)
Dept: INTERNAL MEDICINE CLINIC | Age: 32
End: 2022-04-06
Payer: COMMERCIAL

## 2022-04-06 VITALS
BODY MASS INDEX: 44.1 KG/M2 | DIASTOLIC BLOOD PRESSURE: 68 MMHG | SYSTOLIC BLOOD PRESSURE: 118 MMHG | HEIGHT: 71 IN | HEART RATE: 72 BPM | WEIGHT: 315 LBS | OXYGEN SATURATION: 96 % | TEMPERATURE: 97.7 F

## 2022-04-06 DIAGNOSIS — F41.0 PANIC ATTACKS: ICD-10-CM

## 2022-04-06 DIAGNOSIS — M62.838 MUSCLE SPASM: ICD-10-CM

## 2022-04-06 DIAGNOSIS — M54.9 UPPER BACK PAIN, CHRONIC: ICD-10-CM

## 2022-04-06 DIAGNOSIS — M54.42 CHRONIC MIDLINE LOW BACK PAIN WITH BILATERAL SCIATICA: ICD-10-CM

## 2022-04-06 DIAGNOSIS — R25.1 SHAKINESS: ICD-10-CM

## 2022-04-06 DIAGNOSIS — E78.5 DYSLIPIDEMIA: ICD-10-CM

## 2022-04-06 DIAGNOSIS — F17.290 OTHER TOBACCO PRODUCT NICOTINE DEPENDENCE, UNCOMPLICATED: ICD-10-CM

## 2022-04-06 DIAGNOSIS — M54.2 NECK PAIN: ICD-10-CM

## 2022-04-06 DIAGNOSIS — M45.9 ANKYLOSING SPONDYLITIS, UNSPECIFIED SITE OF SPINE (HCC): ICD-10-CM

## 2022-04-06 DIAGNOSIS — M54.41 CHRONIC MIDLINE LOW BACK PAIN WITH BILATERAL SCIATICA: ICD-10-CM

## 2022-04-06 DIAGNOSIS — G47.33 OBSTRUCTIVE SLEEP APNEA: Primary | ICD-10-CM

## 2022-04-06 DIAGNOSIS — F33.9 DEPRESSION, RECURRENT (HCC): ICD-10-CM

## 2022-04-06 DIAGNOSIS — F41.1 GENERALIZED ANXIETY DISORDER: ICD-10-CM

## 2022-04-06 DIAGNOSIS — R42 DIZZINESS: ICD-10-CM

## 2022-04-06 DIAGNOSIS — E66.01 OBESITY, MORBID, BMI 40.0-49.9 (HCC): ICD-10-CM

## 2022-04-06 DIAGNOSIS — G89.29 CHRONIC MIDLINE LOW BACK PAIN WITH BILATERAL SCIATICA: ICD-10-CM

## 2022-04-06 DIAGNOSIS — G89.29 UPPER BACK PAIN, CHRONIC: ICD-10-CM

## 2022-04-06 PROCEDURE — 3008F BODY MASS INDEX DOCD: CPT | Performed by: INTERNAL MEDICINE

## 2022-04-06 PROCEDURE — 3725F SCREEN DEPRESSION PERFORMED: CPT | Performed by: INTERNAL MEDICINE

## 2022-04-06 PROCEDURE — 99215 OFFICE O/P EST HI 40 MIN: CPT | Performed by: INTERNAL MEDICINE

## 2022-04-06 PROCEDURE — 4004F PT TOBACCO SCREEN RCVD TLK: CPT | Performed by: INTERNAL MEDICINE

## 2022-04-06 RX ORDER — HYDROXYZINE 50 MG/1
25 TABLET, FILM COATED ORAL 3 TIMES DAILY PRN
Qty: 60 TABLET | Refills: 2 | Status: SHIPPED | OUTPATIENT
Start: 2022-04-06

## 2022-04-06 RX ORDER — CYCLOBENZAPRINE HCL 10 MG
10 TABLET ORAL DAILY PRN
Qty: 30 TABLET | Refills: 0 | Status: SHIPPED | OUTPATIENT
Start: 2022-04-06

## 2022-04-06 RX ORDER — NAPROXEN 500 MG/1
500 TABLET ORAL 2 TIMES DAILY WITH MEALS
Qty: 40 TABLET | Refills: 1 | Status: SHIPPED | OUTPATIENT
Start: 2022-04-06

## 2022-04-06 RX ORDER — MULTIVITAMIN
1 TABLET ORAL DAILY
COMMUNITY

## 2022-04-06 RX ORDER — HYDROXYZINE 50 MG/1
25 TABLET, FILM COATED ORAL 3 TIMES DAILY PRN
Qty: 30 TABLET | Refills: 0 | Status: SHIPPED | OUTPATIENT
Start: 2022-04-06 | End: 2022-04-06

## 2022-04-06 NOTE — PROGRESS NOTES
Assessment/Plan:    Obstructive sleep apnea  -persistent but home sleep study has not been done  -home sleep study has been ordered and patient was encouraged to get it done  -I did explain to him that if sleep apnea is controlled, that will help with weight loss    Generalized anxiety disorder with occasional panic attacks  -improved on hydroxyzine but with side effects of fatigue and sleepiness  -I counseled patient to cut down from 50 mg to 25 mg daily p r n   -will refill hydroxyzine as requested  -he is still resistant to daily SSRI or SNRI  -continue with psychotherapy  -follow-up in 3- 4 months    Depression  -stable  -continue with psychotherapy  -patient does not want any SSRIs and SNRI at this time  -will continue to monitor him clinically    Dyslipidemia  -last lipid panel done on February 13th, 2022 showed a total cholesterol of 158, up from 137 on September 16th, 2020, triglyceride of 119, down from 181, HDL of 33, up from 29 and LDL of 101, up from 72 on September 16th, 2020  -patient was counseled to eat a heart healthy diet and to exercise    Back pain and neck pain with muscle spasm secondary to ankylosing spondylitis  -with a diagnosis of ankylosing spondylitis  -symptoms are very well controlled on p r n  Naproxen and Flexeril  -will refill naproxen and Flexeril as requested and patient was counseled to take naproxen always with meals    Dizziness and shakiness  -currently resolved  -we will continue to monitor patient clinically  -lab results were reviewed with him and all questions answered      Nicotine dependence, cigars  -improving  -patient was encouraged to continue to cut down and to finally stop smoking cigars    BMI 47 06  -patient states that he has tried to lose weight on his own but without much success  -he was encouraged to make an appointment to follow-up with bariatrics     Diagnoses and all orders for this visit:    Obstructive sleep apnea    Generalized anxiety disorder  - Discontinue: hydrOXYzine HCL (ATARAX) 50 mg tablet; Take 0 5 tablets (25 mg total) by mouth 3 (three) times a day as needed for anxiety  -     hydrOXYzine HCL (ATARAX) 50 mg tablet; Take 0 5 tablets (25 mg total) by mouth 3 (three) times a day as needed for anxiety    Other tobacco product nicotine dependence, uncomplicated    Shakiness    Dyslipidemia    Dizziness, intermittent    Muscle spasm  -     cyclobenzaprine (FLEXERIL) 10 mg tablet; Take 1 tablet (10 mg total) by mouth daily as needed for muscle spasms    Neck pain  -     naproxen (Naprosyn) 500 mg tablet; Take 1 tablet (500 mg total) by mouth 2 (two) times a day with meals    Chronic midline low back pain with bilateral sciatica    Ankylosing spondylitis, unspecified site of spine (HCC)    Obesity, morbid, BMI 40 0-49 9 (HCC)    Panic attacks    Upper back pain, chronic    Depression, recurrent ([address] )    Other orders  -     Multiple Vitamin (multivitamin) tablet; Take 1 tablet by mouth daily          Depression Screening and Follow-up Plan: Patient's depression screening was positive with a PHQ-2 score of 3  Their PHQ-9 score was 12  Patient assessed for underlying major depression  Brief counseling provided and recommend additional follow-up/re-evaluation next office visit  Continue regular follow-up with their mental health provider who is managing their mental health condition(s)  Subjective:      Patient ID: Jefe Broussard is a 36 y o  male  HPI   Patient presents for a follow-up visit regarding his back pain, neck pain, ankylosing spondylitis, generalized anxiety disorder, shakiness and dizziness, obstructive sleep apnea  Shakiness has improved, he states that it occurred only once  He had blood work done and wants to review the results  Anxiety is controlled on hydroxyzine but it makes him tired and sleepy    He is currently taking 50 mg and states because of the way it makes him feel, he cannot take it during the day but only at night and that the next day he still has after effects from the medication  He has not tried to take half of the medication  He does state that it works for him with regards to the anxiety  Of note, he still does not want to take a daily SSRI or SNRI because of side effects  He also does not want to take a benzodiazepine because of his family and personal history  Still smoking cigars 2-3 a week but used to smoke 12-15 a week, or about 2-3 a day  He states that he is cutting down but because he works with cigars, it is somewhat difficult for him to quit  With regards to his neck pain and back pain, he states that he feels much better  He still has very little pain and sometimes takes his naproxen and Flexeril as needed  Takes his naproxen and flexeril about once a week  He would like a refill of both naproxen and Flexeril  He has not yet made an appointment to go for his sleep study but will do so  With regards to his weight, he states that he saw weight management and has not yet made a follow-up appointment with them  They had an insulin study done which showed high normal insulin per Bariatrics  They recommended metformin but he is not sure that he wants to take it  He admits to occasional palpitations with his anxiety but denies fever, chills, night sweats, headache, dizziness, chest pain, cough, shortness of breath, nausea, vomiting abdominal pain, diarrhea, constipation, myalgias or arthralgias  The following portions of the patient's history were reviewed and updated as appropriate:   He  has a past medical history of Acute torn meniscus of knee, unspecified laterality, initial encounter, Anxiety, Depression, IBS (irritable bowel syndrome), and Panic attack    He   Patient Active Problem List    Diagnosis Date Noted    Shakiness 02/09/2022    Dizziness, intermittent 02/09/2022    Obstructive sleep apnea 02/09/2022    Muscle spasm 05/05/2021    Cervical radiculopathy 04/07/2021    Upper back pain, chronic 03/24/2021    Neck pain 03/24/2021    Neuropathy 03/24/2021    Ankylosing spondylitis ([facility] ) 09/23/2020    Dyslipidemia 09/23/2020    Obesity, morbid, BMI 40 0-49 9 ([facility] ) 09/02/2020    Annual physical exam 09/02/2020    Family history of ankylosing spondylitis 09/02/2020    Chronic low back pain with bilateral sciatica 09/02/2020    Generalized anxiety disorder 09/02/2020    Panic attacks 09/02/2020    Nicotine dependence-cigars 09/02/2020    Family history of premature coronary artery disease 09/02/2020     He  has a past surgical history that includes Adenoidectomy  His family history includes Alcohol abuse in his father; Ankylosing spondylitis in his father; Anxiety disorder in his mother; Bipolar disorder in his father; Cancer in his paternal grandfather; Coronary artery disease in his maternal grandfather and maternal grandmother; Depression in his father, mother, and sister; Diabetes in his maternal grandmother; Drug abuse in his father; Heart attack in his maternal grandfather and maternal grandmother; Hypertension in his maternal grandfather and maternal grandmother; Lung cancer in his paternal grandmother; Substance Abuse in his father and mother  He  reports that he has been smoking cigars  He has smoked for the past 7 00 years  He has never used smokeless tobacco  He reports previous alcohol use  He reports previous drug use  Drug: Marijuana    Current Outpatient Medications   Medication Sig Dispense Refill    hydrOXYzine HCL (ATARAX) 50 mg tablet Take 0 5 tablets (25 mg total) by mouth 3 (three) times a day as needed for anxiety 60 tablet 2    Multiple Vitamin (multivitamin) tablet Take 1 tablet by mouth daily      cyclobenzaprine (FLEXERIL) 10 mg tablet Take 1 tablet (10 mg total) by mouth daily as needed for muscle spasms 30 tablet 0    naproxen (Naprosyn) 500 mg tablet Take 1 tablet (500 mg total) by mouth 2 (two) times a day with meals 40 tablet 1     No current facility-administered medications for this visit  Current Outpatient Medications on File Prior to Visit   Medication Sig    Multiple Vitamin (multivitamin) tablet Take 1 tablet by mouth daily    [DISCONTINUED] hydrOXYzine HCL (ATARAX) 50 mg tablet Take 1 tablet (50 mg total) by mouth 3 (three) times a day as needed for anxiety     No current facility-administered medications on file prior to visit  He is allergic to codeine and penicillins       Review of Systems   Constitutional: Negative for activity change, chills, fatigue, fever and unexpected weight change  HENT: Negative for ear pain, postnasal drip, rhinorrhea, sinus pressure and sore throat  Eyes: Negative for pain  Respiratory: Negative for cough, choking, chest tightness, shortness of breath and wheezing  Cardiovascular: Positive for palpitations (with anxiety)  Negative for chest pain and leg swelling  Gastrointestinal: Negative for abdominal pain, constipation, diarrhea, nausea and vomiting  Genitourinary: Negative for dysuria and hematuria  Musculoskeletal: Positive for back pain (well controlled) and neck pain (much improved)  Negative for arthralgias, gait problem, joint swelling, myalgias and neck stiffness  Skin: Negative for pallor and rash  Neurological: Negative for dizziness, tremors, seizures, syncope, light-headedness and headaches  Hematological: Negative for adenopathy  Psychiatric/Behavioral: Positive for sleep disturbance (Snoring with daytime sleepiness)  Negative for behavioral problems  The patient is nervous/anxious (last panic attack was last week and lasted 2o mins, going for psychotherapy and it is helping)            Objective:      /68 (BP Location: Left arm, Patient Position: Sitting, Cuff Size: Large)   Pulse 72   Temp 97 7 °F (36 5 °C) (Temporal)   Ht 5' 11 46" (1 815 m)   Wt (!) 155 kg (341 lb 12 8 oz)   SpO2 96% Comment: room air  BMI 47 06 kg/m²          Physical Exam  Constitutional:       General: He is not in acute distress  Appearance: He is well-developed  He is not diaphoretic  HENT:      Head: Normocephalic and atraumatic  Right Ear: External ear normal       Left Ear: External ear normal       Nose: Nose normal       Mouth/Throat:      Mouth: Mucous membranes are dry  Pharynx: Posterior oropharyngeal erythema (Oropharyngeal erythema with dry mucous membranes) present  No oropharyngeal exudate  Eyes:      General: No scleral icterus  Right eye: No discharge  Left eye: No discharge  Conjunctiva/sclera: Conjunctivae normal       Pupils: Pupils are equal, round, and reactive to light  Neck:      Thyroid: No thyromegaly  Vascular: No JVD  Trachea: No tracheal deviation  Cardiovascular:      Rate and Rhythm: Normal rate and regular rhythm  Heart sounds: Normal heart sounds  No murmur heard  No friction rub  No gallop  Pulmonary:      Effort: Pulmonary effort is normal  No respiratory distress  Breath sounds: Decreased breath sounds ( decreased breath sounds in all lung fields without any rhonchi, wheezes or rales) present  No wheezing or rales  Chest:      Chest wall: No tenderness  Abdominal:      General: Bowel sounds are normal  There is no distension  Palpations: Abdomen is soft  There is no mass  Tenderness: There is no abdominal tenderness  There is no guarding or rebound  Musculoskeletal:         General: No tenderness or deformity  Normal range of motion  Cervical back: Normal range of motion and neck supple  No spasms or tenderness ( no tenderness or paraspinal muscle hypertonicity)  Thoracic back: No spasms or tenderness ( no tenderness of paraspinal muscle hypertonicity)  Lumbar back: No spasms or tenderness ( no tenderness or paraspinal muscle hypertonicity)  Lymphadenopathy:      Cervical: No cervical adenopathy  Skin:     General: Skin is warm and dry  Coloration: Skin is not pale  Findings: No erythema or rash  Neurological:      Mental Status: He is alert and oriented to person, place, and time  Cranial Nerves: No cranial nerve deficit  Motor: No abnormal muscle tone  Coordination: Coordination normal       Deep Tendon Reflexes: Reflexes are normal and symmetric  Psychiatric:         Mood and Affect: Mood is anxious ( mildly anxious affect) and depressed ( mildly depressed affect)           Behavior: Behavior normal            Appointment on 02/13/2022   Component Date Value Ref Range Status    WBC 02/13/2022 8 79  4 31 - 10 16 Thousand/uL Final    RBC 02/13/2022 5 13  3 88 - 5 62 Million/uL Final    Hemoglobin 02/13/2022 15 2  12 0 - 17 0 g/dL Final    Hematocrit 02/13/2022 46 3  36 5 - 49 3 % Final    MCV 02/13/2022 90  82 - 98 fL Final    MCH 02/13/2022 29 6  26 8 - 34 3 pg Final    MCHC 02/13/2022 32 8  31 4 - 37 4 g/dL Final    RDW 02/13/2022 13 0  11 6 - 15 1 % Final    MPV 02/13/2022 10 7  8 9 - 12 7 fL Final    Platelets 83/67/5704 275  149 - 390 Thousands/uL Final    nRBC 02/13/2022 0  /100 WBCs Final    Neutrophils Relative 02/13/2022 68  43 - 75 % Final    Immat GRANS % 02/13/2022 0  0 - 2 % Final    Lymphocytes Relative 02/13/2022 23  14 - 44 % Final    Monocytes Relative 02/13/2022 8  4 - 12 % Final    Eosinophils Relative 02/13/2022 1  0 - 6 % Final    Basophils Relative 02/13/2022 0  0 - 1 % Final    Neutrophils Absolute 02/13/2022 5 91  1 85 - 7 62 Thousands/µL Final    Immature Grans Absolute 02/13/2022 0 03  0 00 - 0 20 Thousand/uL Final    Lymphocytes Absolute 02/13/2022 2 04  0 60 - 4 47 Thousands/µL Final    Monocytes Absolute 02/13/2022 0 70  0 17 - 1 22 Thousand/µL Final    Eosinophils Absolute 02/13/2022 0 08  0 00 - 0 61 Thousand/µL Final    Basophils Absolute 02/13/2022 0 03  0 00 - 0 10 Thousands/µL Final    TSH 3RD GENERATON 02/13/2022 1 090  0 358 - 3 740 uIU/mL Final    Sodium 02/13/2022 141  136 - 145 mmol/L Final    Potassium 02/13/2022 4 1  3 5 - 5 3 mmol/L Final    Chloride 02/13/2022 111* 100 - 108 mmol/L Final    CO2 02/13/2022 25  21 - 32 mmol/L Final    ANION GAP 02/13/2022 5  4 - 13 mmol/L Final    BUN 02/13/2022 11  5 - 25 mg/dL Final    Creatinine 02/13/2022 0 86  0 60 - 1 30 mg/dL Final    Standardized to IDMS reference method    Glucose, Fasting 02/13/2022 98  65 - 99 mg/dL Final    Specimen collection should occur prior to Sulfasalazine administration due to the potential for falsely depressed results  Specimen collection should occur prior to Sulfapyridine administration due to the potential for falsely elevated results   Calcium 02/13/2022 9 4  8 3 - 10 1 mg/dL Final    AST 02/13/2022 36  5 - 45 U/L Final    Specimen collection should occur prior to Sulfasalazine administration due to the potential for falsely depressed results   ALT 02/13/2022 67  12 - 78 U/L Final    Specimen collection should occur prior to Sulfasalazine and/or Sulfapyridine administration due to the potential for falsely depressed results   Alkaline Phosphatase 02/13/2022 68  46 - 116 U/L Final    Total Protein 02/13/2022 7 7  6 4 - 8 2 g/dL Final    Albumin 02/13/2022 3 7  3 5 - 5 0 g/dL Final    Total Bilirubin 02/13/2022 1 17* 0 20 - 1 00 mg/dL Final    Use of this assay is not recommended for patients undergoing treatment with eltrombopag due to the potential for falsely elevated results      eGFR 02/13/2022 117  ml/min/1 73sq m Final    Cholesterol 02/13/2022 158  See Comment mg/dL Final    Cholesterol:         Pediatric <18 Years        Desirable          <170 mg/dL      Borderline High    170-199 mg/dL      High               >=200 mg/dL        Adult >=18 Years            Desirable         <200 mg/dL      Borderline High   200-239 mg/dL      High              >239 mg/dL      Triglycerides 02/13/2022 119  See Comment mg/dL Final    Triglyceride:     0-9Y <75mg/dL     10Y-17Y         <90 mg/dL       >=18Y     Normal          <150 mg/dL     Borderline High 150-199 mg/dL     High            200-499 mg/dL        Very High       >499 mg/dL    Specimen collection should occur prior to N-Acetylcysteine or Metamizole administration due to the potential for falsely depressed results   HDL, Direct 02/13/2022 33* >=40 mg/dL Final    Specimen collection should occur prior to Metamizole administration due to the potential for falsley depressed results   LDL Calculated 02/13/2022 101* 0 - 100 mg/dL Final    LDL Cholesterol:     Optimal           <100 mg/dl     Near Optimal      100-129 mg/dl     Above Optimal       Borderline High 130-159 mg/dl       High            160-189 mg/dl       Very High       >189 mg/dl         This screening LDL is a calculated result  It does not have the accuracy of the Direct Measured LDL in the monitoring of patients with hyperlipidemia and/or statin therapy  Direct Measure LDL (SPN316) must be ordered separately in these patients      Non-HDL-Chol (CHOL-HDL) 02/13/2022 125  mg/dl Final    Insulin, Fasting 02/13/2022 19 8  3 0 - 25 0 mU/L Final

## 2022-05-06 ENCOUNTER — TELEPHONE (OUTPATIENT)
Dept: SLEEP CENTER | Facility: CLINIC | Age: 32
End: 2022-05-06

## 2022-05-06 NOTE — TELEPHONE ENCOUNTER
----- Message from Brendon Dowd MD sent at 5/5/2022  5:33 PM EDT -----  Enrique Campos    ----- Message -----  From: Ras Toussaint  Sent: 4/12/2022   7:56 AM EDT  To: Sleep Medicine [facility] Provider    This sleep study needs approval      If approved please sign and return to clerical pool  If denied please include reasons why  Also provide alternative testing if warranted  Please sign and return to clerical pool

## 2022-06-01 ENCOUNTER — OFFICE VISIT (OUTPATIENT)
Dept: INTERNAL MEDICINE CLINIC | Age: 32
End: 2022-06-01
Payer: COMMERCIAL

## 2022-06-01 VITALS
HEIGHT: 71 IN | BODY MASS INDEX: 44.1 KG/M2 | HEART RATE: 74 BPM | OXYGEN SATURATION: 98 % | DIASTOLIC BLOOD PRESSURE: 74 MMHG | TEMPERATURE: 98.5 F | WEIGHT: 315 LBS | SYSTOLIC BLOOD PRESSURE: 128 MMHG

## 2022-06-01 DIAGNOSIS — F41.0 PANIC ATTACKS: ICD-10-CM

## 2022-06-01 DIAGNOSIS — F41.1 GENERALIZED ANXIETY DISORDER: Primary | ICD-10-CM

## 2022-06-01 DIAGNOSIS — U09.9 POST COVID-19 CONDITION, UNSPECIFIED: ICD-10-CM

## 2022-06-01 DIAGNOSIS — F33.1 MODERATE EPISODE OF RECURRENT MAJOR DEPRESSIVE DISORDER (HCC): ICD-10-CM

## 2022-06-01 DIAGNOSIS — F33.9 DEPRESSION, RECURRENT (HCC): ICD-10-CM

## 2022-06-01 PROCEDURE — 3008F BODY MASS INDEX DOCD: CPT | Performed by: INTERNAL MEDICINE

## 2022-06-01 PROCEDURE — 99214 OFFICE O/P EST MOD 30 MIN: CPT | Performed by: INTERNAL MEDICINE

## 2022-06-01 PROCEDURE — 4004F PT TOBACCO SCREEN RCVD TLK: CPT | Performed by: INTERNAL MEDICINE

## 2022-06-01 RX ORDER — FLUOXETINE HYDROCHLORIDE 20 MG/1
20 CAPSULE ORAL DAILY
Qty: 60 CAPSULE | Refills: 1 | Status: SHIPPED | OUTPATIENT
Start: 2022-06-01 | End: 2022-07-11 | Stop reason: DRUGHIGH

## 2022-06-01 NOTE — PROGRESS NOTES
Assessment/Plan: Moderate episode of major depressive disorder  -will trial patient on fluoxetine 20 mg daily   -he was counseled that the medication may take about 4-6 weeks for its full effect to be observed  -he was counseled that he should not discontinue the medication abruptly but should call for instructions to taper it off if he wants to stop taking it  -patient was counseled to monitor himself for adverse reactions such as worsening depression, sexual side effects, weight gain   -he should continue with psychotherapy  -follow-up in 6 weeks    Generalized anxiety disorder  -will start patient on fluoxetine as above   -continue with p r n  hydroxyzine  -continue with psychotherapy    Panic attacks   -will start patient on fluoxetine  -continue with psychotherapy and with hydroxyzine as needed    Post COVID condition  -patient denies any worsening of his depression post COVID   -continue with symptomatic treatment as needed   -will continue to monitor patient closely clinically     Diagnoses and all orders for this visit:    Generalized anxiety disorder  -     FLUoxetine (PROzac) 20 mg capsule; Take 1 capsule (20 mg total) by mouth daily    Panic attacks  -     FLUoxetine (PROzac) 20 mg capsule; Take 1 capsule (20 mg total) by mouth daily    Moderate episode of recurrent major depressive disorder (HCC)  -     FLUoxetine (PROzac) 20 mg capsule; Take 1 capsule (20 mg total) by mouth daily    Depression, recurrent (HCC)    Post covid-19 condition, unspecified          Subjective:      Patient ID: Flavio Oshea is a 36 y o  male  HPI  Pt presents for follow-up visit regarding his depression and anxiety  He states that he did not want to take and antidepressant in the past because he tried an antidepressant in the past and did not like it  Of note, he does not remember the anti depressant he took and states that the issue may not even have been with the antidepressant itself    Of note, he states that he did not like the medication or the practice  Cannot remember the name of the medication but stopped taking the medicaton after while  He admits that he has been feeling sad, with associated poor interest, weight gain, fatigue, anxiety, increased eating, poor sleep,  More concerned about his thought patterns - has morbid depressed thoughts  Feels like it might be better if he were not alive but would not take his own life  He would like to try an antidepressant at this time  Has a problem with sleep  Hydroxyzine helps but his sleep pattern is poor with nightmares and unrestful sleep  Has a sleep study scheduled for next month  Taking Naproxen about 2-3 times a month for his back pain  Still seeing psychottherapy and states that it is going well  Recently had COVID infection about 2 weeks ago and states that he was quite ill but his symptoms are almost completely resolved  He does not believe that his COVID-19 infection worsened his depression, but states that rather he was so sick that he did not worry about his depression and anxiety when he was going through [facility]  Most of his COVID symptoms are resolved  He only has an intermittent cough , not needing medications for his cough  The following portions of the patient's history were reviewed and updated as appropriate:   He  has a past medical history of Acute torn meniscus of knee, unspecified laterality, initial encounter, Anxiety, COVID-19 (05/14/2022), Depression, IBS (irritable bowel syndrome), and Panic attack    He   Patient Active Problem List    Diagnosis Date Noted    [facility], [facility] 06/01/2022    Post covid-19 condition, unspecified 06/01/2022    Shakiness 02/09/2022    Dizziness, intermittent 02/09/2022    Obstructive sleep apnea 02/09/2022    Muscle spasm 05/05/2021    Cervical radiculopathy 04/07/2021    Upper back pain, chronic 03/24/2021    Neck pain 03/24/2021    Neuropathy 03/24/2021    [facility]ylosing spondyl[facility] ) 09/23/2020    Dyslipidemia 09/23/2020    Obesity, morbid, BMI 40 0-49 9 ([facility] ) 09/02/2020    Annual physical exam 09/02/2020    Family history of ankylosing spondylitis 09/02/2020    Chronic low back pain with bilateral sciatica 09/02/2020    Generalized anxiety disorder 09/02/2020    Panic attacks 09/02/2020    Nicotine dependence-cigars 09/02/2020    Family history of premature coronary artery disease 09/02/2020     He  has a past surgical history that includes Adenoidectomy  His family history includes Alcohol abuse in his father; Ankylosing spondylitis in his father; Anxiety disorder in his mother; Bipolar disorder in his father; Cancer in his paternal grandfather; Coronary artery disease in his maternal grandfather and maternal grandmother; Depression in his father, mother, and sister; Diabetes in his maternal grandmother; Drug abuse in his father; Heart attack in his maternal grandfather and maternal grandmother; Hypertension in his maternal grandfather and maternal grandmother; Lung cancer in his paternal grandmother; Substance Abuse in his father and mother  He  reports that he has been smoking cigars  He has smoked for the past 7 00 years  He has never used smokeless tobacco  He reports previous alcohol use  He reports previous drug use  Drug: Marijuana    Current Outpatient Medications   Medication Sig Dispense Refill    cyclobenzaprine (FLEXERIL) 10 mg tablet Take 1 tablet (10 mg total) by mouth daily as needed for muscle spasms 30 tablet 0    FLUoxetine (PROzac) 20 mg capsule Take 1 capsule (20 mg total) by mouth daily 60 capsule 1    hydrOXYzine HCL (ATARAX) 50 mg tablet Take 0 5 tablets (25 mg total) by mouth 3 (three) times a day as needed for anxiety 60 tablet 2    Multiple Vitamin (multivitamin) tablet Take 1 tablet by mouth daily      naproxen (Naprosyn) 500 mg tablet Take 1 tablet (500 mg total) by mouth 2 (two) times a day with meals (Patient taking differently: Take 500 mg by mouth if needed) 40 tablet 1     No current facility-administered medications for this visit  Current Outpatient Medications on File Prior to Visit   Medication Sig    cyclobenzaprine (FLEXERIL) 10 mg tablet Take 1 tablet (10 mg total) by mouth daily as needed for muscle spasms    hydrOXYzine HCL (ATARAX) 50 mg tablet Take 0 5 tablets (25 mg total) by mouth 3 (three) times a day as needed for anxiety    Multiple Vitamin (multivitamin) tablet Take 1 tablet by mouth daily    naproxen (Naprosyn) 500 mg tablet Take 1 tablet (500 mg total) by mouth 2 (two) times a day with meals (Patient taking differently: Take 500 mg by mouth if needed)     No current facility-administered medications on file prior to visit  He is allergic to codeine and penicillins       Review of Systems   Constitutional: Positive for appetite change (stress eater), fatigue and unexpected weight change (goes up and down, was at his highest wt 6 months ago , mostly wt gain)  Negative for activity change, chills and fever  HENT: Negative for ear pain, postnasal drip, rhinorrhea, sinus pressure and sore throat  Eyes: Negative for pain  Respiratory: Negative for cough, choking, chest tightness, shortness of breath and wheezing  Cardiovascular: Negative for chest pain, palpitations and leg swelling  Gastrointestinal: Negative for abdominal pain, constipation, diarrhea, nausea and vomiting  Genitourinary: Negative for dysuria and hematuria  Musculoskeletal: Negative for arthralgias, back pain, gait problem, joint swelling, myalgias and neck stiffness  Skin: Negative for pallor and rash  Neurological: Negative for dizziness, tremors, seizures, syncope, light-headedness and headaches  Hematological: Negative for adenopathy     Psychiatric/Behavioral: Positive for decreased concentration, dysphoric mood (with poor interest), sleep disturbance (insomnia) and suicidal ideas (has SI but will not take his own life because he wants to live)  Negative for behavioral problems  The patient is nervous/anxious  Objective:      /74 (BP Location: Left arm, Patient Position: Sitting, Cuff Size: Large)   Pulse 74   Temp 98 5 °F (36 9 °C) (Temporal)   Ht 5' 11" (1 803 m)   Wt (!) 151 kg (333 lb)   SpO2 98% Comment: room air  BMI 46 44 kg/m²          Physical Exam  Constitutional:       General: He is not in acute distress  Appearance: He is well-developed  He is not diaphoretic  HENT:      Head: Normocephalic and atraumatic  Right Ear: External ear normal       Left Ear: External ear normal       Nose: Nose normal       Mouth/Throat:      Mouth: Mucous membranes are dry  Pharynx: Posterior oropharyngeal erythema (Oropharyngeal erythema with dry mucous membranes) present  No oropharyngeal exudate  Eyes:      General: No scleral icterus  Right eye: No discharge  Left eye: No discharge  Conjunctiva/sclera: Conjunctivae normal       Pupils: Pupils are equal, round, and reactive to light  Neck:      Thyroid: No thyromegaly  Vascular: No JVD  Trachea: No tracheal deviation  Cardiovascular:      Rate and Rhythm: Normal rate and regular rhythm  Heart sounds: Normal heart sounds  No murmur heard  No friction rub  No gallop  Pulmonary:      Effort: Pulmonary effort is normal  No respiratory distress  Breath sounds: Normal breath sounds  No wheezing or rales  Chest:      Chest wall: No tenderness  Abdominal:      General: Bowel sounds are normal  There is no distension  Palpations: Abdomen is soft  There is no mass  Tenderness: There is no abdominal tenderness  There is no guarding or rebound  Musculoskeletal:         General: No tenderness or deformity  Normal range of motion  Cervical back: Normal range of motion and neck supple  Lymphadenopathy:      Cervical: No cervical adenopathy  Skin:     General: Skin is warm and dry        Coloration: Skin is not pale       Findings: No erythema or rash  Neurological:      Mental Status: He is alert and oriented to person, place, and time  Cranial Nerves: No cranial nerve deficit  Motor: No abnormal muscle tone  Coordination: Coordination normal       Deep Tendon Reflexes: Reflexes are normal and symmetric  Psychiatric:         Mood and Affect: Mood is anxious ( anxious affect) and depressed ( depressed affect)           Behavior: Behavior normal            Appointment on 02/13/2022   Component Date Value Ref Range Status    WBC 02/13/2022 8 79  4 31 - 10 16 Thousand/uL Final    RBC 02/13/2022 5 13  3 88 - 5 62 Million/uL Final    Hemoglobin 02/13/2022 15 2  12 0 - 17 0 g/dL Final    Hematocrit 02/13/2022 46 3  36 5 - 49 3 % Final    MCV 02/13/2022 90  82 - 98 fL Final    MCH 02/13/2022 29 6  26 8 - 34 3 pg Final    MCHC 02/13/2022 32 8  31 4 - 37 4 g/dL Final    RDW 02/13/2022 13 0  11 6 - 15 1 % Final    MPV 02/13/2022 10 7  8 9 - 12 7 fL Final    Platelets 24/86/8329 275  149 - 390 Thousands/uL Final    nRBC 02/13/2022 0  /100 WBCs Final    Neutrophils Relative 02/13/2022 68  43 - 75 % Final    Immat GRANS % 02/13/2022 0  0 - 2 % Final    Lymphocytes Relative 02/13/2022 23  14 - 44 % Final    Monocytes Relative 02/13/2022 8  4 - 12 % Final    Eosinophils Relative 02/13/2022 1  0 - 6 % Final    Basophils Relative 02/13/2022 0  0 - 1 % Final    Neutrophils Absolute 02/13/2022 5 91  1 85 - 7 62 Thousands/µL Final    Immature Grans Absolute 02/13/2022 0 03  0 00 - 0 20 Thousand/uL Final    Lymphocytes Absolute 02/13/2022 2 04  0 60 - 4 47 Thousands/µL Final    Monocytes Absolute 02/13/2022 0 70  0 17 - 1 22 Thousand/µL Final    Eosinophils Absolute 02/13/2022 0 08  0 00 - 0 61 Thousand/µL Final    Basophils Absolute 02/13/2022 0 03  0 00 - 0 10 Thousands/µL Final    TSH 3RD GENERATON 02/13/2022 1 090  0 358 - 3 740 uIU/mL Final    Sodium 02/13/2022 141  136 - 145 mmol/L Final    Potassium 02/13/2022 4 1  3 5 - 5 3 mmol/L Final    Chloride 02/13/2022 111 (A) 100 - 108 mmol/L Final    CO2 02/13/2022 25  21 - 32 mmol/L Final    ANION GAP 02/13/2022 5  4 - 13 mmol/L Final    BUN 02/13/2022 11  5 - 25 mg/dL Final    Creatinine 02/13/2022 0 86  0 60 - 1 30 mg/dL Final    Standardized to IDMS reference method    Glucose, Fasting 02/13/2022 98  65 - 99 mg/dL Final    Specimen collection should occur prior to Sulfasalazine administration due to the potential for falsely depressed results  Specimen collection should occur prior to Sulfapyridine administration due to the potential for falsely elevated results   Calcium 02/13/2022 9 4  8 3 - 10 1 mg/dL Final    AST 02/13/2022 36  5 - 45 U/L Final    Specimen collection should occur prior to Sulfasalazine administration due to the potential for falsely depressed results   ALT 02/13/2022 67  12 - 78 U/L Final    Specimen collection should occur prior to Sulfasalazine and/or Sulfapyridine administration due to the potential for falsely depressed results   Alkaline Phosphatase 02/13/2022 68  46 - 116 U/L Final    Total Protein 02/13/2022 7 7  6 4 - 8 2 g/dL Final    Albumin 02/13/2022 3 7  3 5 - 5 0 g/dL Final    Total Bilirubin 02/13/2022 1 17 (A) 0 20 - 1 00 mg/dL Final    Use of this assay is not recommended for patients undergoing treatment with eltrombopag due to the potential for falsely elevated results      eGFR 02/13/2022 117  ml/min/1 73sq m Final    Cholesterol 02/13/2022 158  See Comment mg/dL Final    Cholesterol:         Pediatric <18 Years        Desirable          <170 mg/dL      Borderline High    170-199 mg/dL      High               >=200 mg/dL        Adult >=18 Years            Desirable         <200 mg/dL      Borderline High   200-239 mg/dL      High              >239 mg/dL      Triglycerides 02/13/2022 119  See Comment mg/dL Final    Triglyceride:     0-9Y            <75mg/dL     10Y-17Y         <90 mg/dL >=18Y     Normal          <150 mg/dL     Borderline High 150-199 mg/dL     High            200-499 mg/dL        Very High       >499 mg/dL    Specimen collection should occur prior to N-Acetylcysteine or Metamizole administration due to the potential for falsely depressed results   HDL, Direct 02/13/2022 33 (A) >=40 mg/dL Final    Specimen collection should occur prior to Metamizole administration due to the potential for falsley depressed results   LDL Calculated 02/13/2022 101 (A) 0 - 100 mg/dL Final    LDL Cholesterol:     Optimal           <100 mg/dl     Near Optimal      100-129 mg/dl     Above Optimal       Borderline High 130-159 mg/dl       High            160-189 mg/dl       Very High       >189 mg/dl         This screening LDL is a calculated result  It does not have the accuracy of the Direct Measured LDL in the monitoring of patients with hyperlipidemia and/or statin therapy  Direct Measure LDL (MGW870) must be ordered separately in these patients      Non-HDL-Chol (CHOL-HDL) 02/13/2022 125  mg/dl Final    Insulin, Fasting 02/13/2022 19 8  3 0 - 25 0 mU/L Final

## 2022-07-05 ENCOUNTER — RA CDI HCC (OUTPATIENT)
Dept: OTHER | Facility: HOSPITAL | Age: 32
End: 2022-07-05

## 2022-07-05 NOTE — PROGRESS NOTES
[facility]  coding opportunities       Chart reviewed, no opportunity found: CHART REVIEWED, NO OPPORTUNITY FOUND        Patients Insurance        Commercial Insurance: [employer]

## 2022-07-11 ENCOUNTER — OFFICE VISIT (OUTPATIENT)
Dept: INTERNAL MEDICINE CLINIC | Age: 32
End: 2022-07-11
Payer: COMMERCIAL

## 2022-07-11 VITALS
BODY MASS INDEX: 44.1 KG/M2 | OXYGEN SATURATION: 97 % | HEART RATE: 76 BPM | HEIGHT: 71 IN | SYSTOLIC BLOOD PRESSURE: 110 MMHG | DIASTOLIC BLOOD PRESSURE: 70 MMHG | WEIGHT: 315 LBS | TEMPERATURE: 98.9 F

## 2022-07-11 DIAGNOSIS — F41.1 GENERALIZED ANXIETY DISORDER: Primary | ICD-10-CM

## 2022-07-11 DIAGNOSIS — G89.29 UPPER BACK PAIN, CHRONIC: ICD-10-CM

## 2022-07-11 DIAGNOSIS — M54.9 UPPER BACK PAIN, CHRONIC: ICD-10-CM

## 2022-07-11 DIAGNOSIS — M54.41 CHRONIC MIDLINE LOW BACK PAIN WITH BILATERAL SCIATICA: ICD-10-CM

## 2022-07-11 DIAGNOSIS — F33.9 DEPRESSION, RECURRENT (HCC): ICD-10-CM

## 2022-07-11 DIAGNOSIS — F41.0 PANIC ATTACKS: ICD-10-CM

## 2022-07-11 DIAGNOSIS — G47.33 OBSTRUCTIVE SLEEP APNEA: ICD-10-CM

## 2022-07-11 DIAGNOSIS — M45.9 ANKYLOSING SPONDYLITIS, UNSPECIFIED SITE OF SPINE (HCC): ICD-10-CM

## 2022-07-11 DIAGNOSIS — M54.42 CHRONIC MIDLINE LOW BACK PAIN WITH BILATERAL SCIATICA: ICD-10-CM

## 2022-07-11 DIAGNOSIS — G89.29 CHRONIC MIDLINE LOW BACK PAIN WITH BILATERAL SCIATICA: ICD-10-CM

## 2022-07-11 PROCEDURE — 99215 OFFICE O/P EST HI 40 MIN: CPT | Performed by: INTERNAL MEDICINE

## 2022-07-11 RX ORDER — FLUOXETINE HYDROCHLORIDE 40 MG/1
40 CAPSULE ORAL DAILY
Qty: 60 CAPSULE | Refills: 1 | Status: SHIPPED | OUTPATIENT
Start: 2022-07-11 | End: 2022-10-05

## 2022-07-11 NOTE — PROGRESS NOTES
Assessment/Plan:    Generalized anxiety disorder with occasional panic attacks  -not optimally controlled on current dose of fluoxetine  -will increase the dose from 20-40 mg daily  -he was counseled to call the office if his symptoms are not optimally controlled in 4-6 weeks  -continue with hydroxyzine p r n   --follow-up with psychotherapy  -follow-up in 3 months or sooner as needed    Depression  -not optimally controlled  -will increase the dose of fluoxetine  -patient was counseled to watch for side effects including worsening depression, sexual side effects or weight gain and to report to the clinic if these occur  -continue to follow with psychotherapy    Obstructive sleep apnea  -will follow up with the results of his sleep study which is scheduled for next week  -we hope that control of his sleep apnea will help his depression and anxiety    Ankylosing spondylitis  -well controlled  -will continue to monitor patient clinically    Neck pain and back pain  -stable  -continue with p r n  Flexeril and naproxen     Diagnoses and all orders for this visit:    Generalized anxiety disorder  -     FLUoxetine (PROzac) 40 MG capsule; Take 1 capsule (40 mg total) by mouth daily    Panic attacks  -     FLUoxetine (PROzac) 40 MG capsule; Take 1 capsule (40 mg total) by mouth daily    Depression, recurrent (HCC)  -     FLUoxetine (PROzac) 40 MG capsule; Take 1 capsule (40 mg total) by mouth daily    Obstructive sleep apnea    Upper back pain, chronic    Ankylosing spondylitis, unspecified site of spine (HCC)    Chronic midline low back pain with bilateral sciatica          Subjective:      Patient ID: Yanique Riley is a 36 y o  male  HPI  Pt presents for follow-up visit regarding his obstructive sleep apnea, generalized anxiety disorder, panic attacks, depression, ankylosing spondylitis with back pain and neck pain  He was seen about 6 weeks ago for his anxiety and depression and started on Prozac    Not feeling much different  No sexual side effects and no wt gain and no worsening depression  He will have the home sleep study done next week  Has not had any panic attacks  He states that his sleep has not been good and it takes him long to fall asleep and then he wakes up and does not feel refreshed  Back pain has been well controlled  2/10  He takes the flexeril about once a week  Neck pain is about 1/10  taking naporxen as needed - states that this is rare  Walking and trying to exercise   He admits to occasional palpitations, fatigue but denies fever, chills, night sweats, day, dizziness, chest pain, shortness of breath, nausea, vomiting abdominal pain, diarrhea, constipation  The following portions of the patient's history were reviewed and updated as appropriate:   He  has a past medical history of Acute torn meniscus of knee, unspecified laterality, initial encounter, Anxiety, COVID-19 (05/14/2022), Depression, IBS (irritable bowel syndrome), and Panic attack  He   Patient Active Problem List    Diagnosis Date Noted    Depression, recurrent ([place] [address] ) 06/01/2022    Post covid-19 condition, unspecified 06/01/2022    Shakiness 02/09/2022    Dizziness, intermittent 02/09/2022    Obstructive sleep apnea 02/09/2022    Muscle spasm 05/05/2021    Cervical radiculopathy 04/07/2021    Upper back pain, chronic 03/24/2021    Neck pain 03/24/2021    Neuropathy 03/24/2021    Ankylosing spondylitis ([place] [address] ) 09/23/2020    Dyslipidemia 09/23/2020    Obesity, morbid, BMI 40 0-49 9 ([place] [address] ) 09/02/2020    Annual physical exam 09/02/2020    Family history of ankylosing spondylitis 09/02/2020    Chronic low back pain with bilateral sciatica 09/02/2020    Generalized anxiety disorder 09/02/2020    Panic attacks 09/02/2020    Nicotine dependence-cigars 09/02/2020    Family history of premature coronary artery disease 09/02/2020     He  has a past surgical history that includes Adenoidectomy    His family history includes Alcohol abuse in his father; Ankylosing spondylitis in his father; Anxiety disorder in his mother; Bipolar disorder in his father; Cancer in his paternal grandfather; Coronary artery disease in his maternal grandfather and maternal grandmother; Depression in his father, mother, and sister; Diabetes in his maternal grandmother; Drug abuse in his father; Heart attack in his maternal grandfather and maternal grandmother; Hypertension in his maternal grandfather and maternal grandmother; Lung cancer in his paternal grandmother; Substance Abuse in his father and mother  He  reports that he has been smoking cigars  He has smoked for the past 7 00 years  He has never used smokeless tobacco  He reports previous alcohol use  He reports previous drug use  Drug: Marijuana  Current Outpatient Medications   Medication Sig Dispense Refill    cyclobenzaprine (FLEXERIL) 10 mg tablet Take 1 tablet (10 mg total) by mouth daily as needed for muscle spasms 30 tablet 0    FLUoxetine (PROzac) 40 MG capsule Take 1 capsule (40 mg total) by mouth daily 60 capsule 1    hydrOXYzine HCL (ATARAX) 50 mg tablet Take 0 5 tablets (25 mg total) by mouth 3 (three) times a day as needed for anxiety 60 tablet 2    Multiple Vitamin (multivitamin) tablet Take 1 tablet by mouth daily      naproxen (Naprosyn) 500 mg tablet Take 1 tablet (500 mg total) by mouth 2 (two) times a day with meals (Patient taking differently: Take 500 mg by mouth if needed) 40 tablet 1     No current facility-administered medications for this visit       Current Outpatient Medications on File Prior to Visit   Medication Sig    cyclobenzaprine (FLEXERIL) 10 mg tablet Take 1 tablet (10 mg total) by mouth daily as needed for muscle spasms    hydrOXYzine HCL (ATARAX) 50 mg tablet Take 0 5 tablets (25 mg total) by mouth 3 (three) times a day as needed for anxiety    Multiple Vitamin (multivitamin) tablet Take 1 tablet by mouth daily    naproxen (Naprosyn) 500 mg tablet Take 1 tablet (500 mg total) by mouth 2 (two) times a day with meals (Patient taking differently: Take 500 mg by mouth if needed)    [DISCONTINUED] FLUoxetine (PROzac) 20 mg capsule Take 1 capsule (20 mg total) by mouth daily     No current facility-administered medications on file prior to visit  He is allergic to codeine and penicillins       Review of Systems   Constitutional: Positive for fatigue  Negative for activity change, chills, fever and unexpected weight change  HENT: Negative for ear pain, postnasal drip, rhinorrhea, sinus pressure and sore throat  Eyes: Negative for pain  Respiratory: Negative for cough, choking, chest tightness, shortness of breath and wheezing  Cardiovascular: Positive for palpitations (with the anxiety)  Negative for chest pain and leg swelling  Gastrointestinal: Negative for abdominal pain, constipation, diarrhea, nausea and vomiting  Genitourinary: Negative for dysuria and hematuria  Musculoskeletal: Positive for back pain and neck pain  Negative for arthralgias, gait problem, joint swelling, myalgias and neck stiffness  Skin: Negative for pallor and rash  Neurological: Negative for dizziness, tremors, seizures, syncope, light-headedness and headaches  Hematological: Negative for adenopathy  Psychiatric/Behavioral: Positive for dysphoric mood and sleep disturbance (unrestful sleep but is not sure if he snores)  Negative for behavioral problems  The patient is nervous/anxious  Objective:      /70 (BP Location: Left arm, Patient Position: Sitting, Cuff Size: Large)   Pulse 76   Temp 98 9 °F (37 2 °C) (Temporal)   Ht 5' 11" (1 803 m)   Wt (!) 150 kg (330 lb)   SpO2 97% Comment: ra  BMI 46 03 kg/m²          Physical Exam  Constitutional:       General: He is not in acute distress  Appearance: He is well-developed  He is obese  He is not diaphoretic  Comments: BMI is 46 03   HENT:      Head: Normocephalic and atraumatic        Right Ear: External ear normal       Left Ear: External ear normal       Nose: Nose normal       Mouth/Throat:      Pharynx: Posterior oropharyngeal erythema (Oropharyngeal erythema with dry mucous membranes) present  No oropharyngeal exudate  Eyes:      General: No scleral icterus  Right eye: No discharge  Left eye: No discharge  Conjunctiva/sclera: Conjunctivae normal       Pupils: Pupils are equal, round, and reactive to light  Neck:      Thyroid: No thyromegaly  Vascular: No JVD  Trachea: No tracheal deviation  Cardiovascular:      Rate and Rhythm: Normal rate and regular rhythm  Heart sounds: Normal heart sounds  No murmur heard  No friction rub  No gallop  Pulmonary:      Effort: Pulmonary effort is normal  No respiratory distress  Breath sounds: Normal breath sounds  No wheezing or rales  Chest:      Chest wall: No tenderness  Abdominal:      General: Bowel sounds are normal  There is no distension  Palpations: Abdomen is soft  There is no mass  Tenderness: There is no abdominal tenderness  There is no guarding or rebound  Musculoskeletal:         General: No deformity  Normal range of motion  Cervical back: Normal range of motion and neck supple  No spasms or tenderness (No tenderness or paraspinal muscle hypertonicity)  Thoracic back: No spasms or tenderness ( no tenderness of paraspinal muscle hypertonicity)  Lymphadenopathy:      Cervical: No cervical adenopathy  Skin:     General: Skin is warm and dry  Coloration: Skin is not pale  Findings: No erythema or rash  Neurological:      Mental Status: He is alert and oriented to person, place, and time  Cranial Nerves: No cranial nerve deficit  Motor: No abnormal muscle tone  Coordination: Coordination normal       Deep Tendon Reflexes: Reflexes are normal and symmetric  Psychiatric:         Mood and Affect: Mood is depressed ( dysphoric affect)  Behavior: Behavior normal            Appointment on 02/13/2022   Component Date Value Ref Range Status    WBC 02/13/2022 8 79  4 31 - 10 16 Thousand/uL Final    RBC 02/13/2022 5 13  3 88 - 5 62 Million/uL Final    Hemoglobin 02/13/2022 15 2  12 0 - 17 0 g/dL Final    Hematocrit 02/13/2022 46 3  36 5 - 49 3 % Final    MCV 02/13/2022 90  82 - 98 fL Final    MCH 02/13/2022 29 6  26 8 - 34 3 pg Final    MCHC 02/13/2022 32 8  31 4 - 37 4 g/dL Final    RDW 02/13/2022 13 0  11 6 - 15 1 % Final    MPV 02/13/2022 10 7  8 9 - 12 7 fL Final    Platelets 22/59/4763 275  149 - 390 Thousands/uL Final    nRBC 02/13/2022 0  /100 WBCs Final    Neutrophils Relative 02/13/2022 68  43 - 75 % Final    Immat GRANS % 02/13/2022 0  0 - 2 % Final    Lymphocytes Relative 02/13/2022 23  14 - 44 % Final    Monocytes Relative 02/13/2022 8  4 - 12 % Final    Eosinophils Relative 02/13/2022 1  0 - 6 % Final    Basophils Relative 02/13/2022 0  0 - 1 % Final    Neutrophils Absolute 02/13/2022 5 91  1 85 - 7 62 Thousands/µL Final    Immature Grans Absolute 02/13/2022 0 03  0 00 - 0 20 Thousand/uL Final    Lymphocytes Absolute 02/13/2022 2 04  0 60 - 4 47 Thousands/µL Final    Monocytes Absolute 02/13/2022 0 70  0 17 - 1 22 Thousand/µL Final    Eosinophils Absolute 02/13/2022 0 08  0 00 - 0 61 Thousand/µL Final    Basophils Absolute 02/13/2022 0 03  0 00 - 0 10 Thousands/µL Final    TSH 3RD GENERATON 02/13/2022 1 090  0 358 - 3 740 uIU/mL Final    Sodium 02/13/2022 141  136 - 145 mmol/L Final    Potassium 02/13/2022 4 1  3 5 - 5 3 mmol/L Final    Chloride 02/13/2022 111 (A) 100 - 108 mmol/L Final    CO2 02/13/2022 25  21 - 32 mmol/L Final    ANION GAP 02/13/2022 5  4 - 13 mmol/L Final    BUN 02/13/2022 11  5 - 25 mg/dL Final    Creatinine 02/13/2022 0 86  0 60 - 1 30 mg/dL Final    Standardized to IDMS reference method    Glucose, Fasting 02/13/2022 98  65 - 99 mg/dL Final    Specimen collection should occur prior to Sulfasalazine administration due to the potential for falsely depressed results  Specimen collection should occur prior to Sulfapyridine administration due to the potential for falsely elevated results   Calcium 02/13/2022 9 4  8 3 - 10 1 mg/dL Final    AST 02/13/2022 36  5 - 45 U/L Final    Specimen collection should occur prior to Sulfasalazine administration due to the potential for falsely depressed results   ALT 02/13/2022 67  12 - 78 U/L Final    Specimen collection should occur prior to Sulfasalazine and/or Sulfapyridine administration due to the potential for falsely depressed results   Alkaline Phosphatase 02/13/2022 68  46 - 116 U/L Final    Total Protein 02/13/2022 7 7  6 4 - 8 2 g/dL Final    Albumin 02/13/2022 3 7  3 5 - 5 0 g/dL Final    Total Bilirubin 02/13/2022 1 17 (A) 0 20 - 1 00 mg/dL Final    Use of this assay is not recommended for patients undergoing treatment with eltrombopag due to the potential for falsely elevated results   eGFR 02/13/2022 117  ml/min/1 73sq m Final    Cholesterol 02/13/2022 158  See Comment mg/dL Final    Cholesterol:         Pediatric <18 Years        Desirable          <170 mg/dL      Borderline High    170-199 mg/dL      High               >=200 mg/dL        Adult >=18 Years            Desirable         <200 mg/dL      Borderline High   200-239 mg/dL      High              >239 mg/dL      Triglycerides 02/13/2022 119  See Comment mg/dL Final    Triglyceride:     0-9Y            <75mg/dL     10Y-17Y         <90 mg/dL       >=18Y     Normal          <150 mg/dL     Borderline High 150-199 mg/dL     High            200-499 mg/dL        Very High       >499 mg/dL    Specimen collection should occur prior to N-Acetylcysteine or Metamizole administration due to the potential for falsely depressed results      HDL, Direct 02/13/2022 33 (A) >=40 mg/dL Final    Specimen collection should occur prior to Metamizole administration due to the potential for shonna depressed results   LDL Calculated 02/13/2022 101 (A) 0 - 100 mg/dL Final    LDL Cholesterol:     Optimal           <100 mg/dl     Near Optimal      100-129 mg/dl     Above Optimal       Borderline High 130-159 mg/dl       High            160-189 mg/dl       Very High       >189 mg/dl         This screening LDL is a calculated result  It does not have the accuracy of the Direct Measured LDL in the monitoring of patients with hyperlipidemia and/or statin therapy  Direct Measure LDL ([id number]) must be ordered separately in these patients      Non-HDL-Chol (CHOL-HDL) 02/13/2022 125  mg/dl Final    Insulin, Fasting 02/13/2022 19 8  3 0 - 25 0 mU/L Final

## 2022-07-20 ENCOUNTER — HOSPITAL ENCOUNTER (OUTPATIENT)
Dept: SLEEP CENTER | Facility: CLINIC | Age: 32
Discharge: HOME | End: 2022-07-20
Payer: COMMERCIAL

## 2022-07-20 DIAGNOSIS — G47.33 OBSTRUCTIVE SLEEP APNEA: ICD-10-CM

## 2022-07-20 PROCEDURE — G0399 HOME SLEEP TEST/TYPE 3 PORTA: HCPCS

## 2022-07-21 NOTE — PROGRESS NOTES
Home Sleep Study Documentation    HOME STUDY DEVICE: Noxturnal yes                                           Glendy G3 no      Pre-Sleep Home Study:    Set-up and instructions performed by: Torsten Rodriguez    Technician performed demonstration for Patient: yes    Return demonstration performed by Patient: yes    Written instructions provided to Patient: yes    Patient signed consent form: yes        Post-Sleep Home Study:    Additional comments by Patient: none    Home Sleep Study Failed:no:    Failure reason: N/A    Reported or Detected: N/A    Scored by: JAIME Stearns, [email or web address]

## 2022-07-25 ENCOUNTER — TELEPHONE (OUTPATIENT)
Dept: SLEEP CENTER | Facility: CLINIC | Age: 32
End: 2022-07-25

## 2022-07-25 NOTE — TELEPHONE ENCOUNTER
----- Message from Kirk Talamantes MD sent at 7/24/2022 12:55 PM EDT -----   Please schedule for sleep clinic   Thanks -if needed

## 2022-07-25 NOTE — TELEPHONE ENCOUNTER
Left message for the patient to call back for sleep study results       No AMENA   Can offer consult if patient wants

## 2022-08-12 NOTE — TELEPHONE ENCOUNTER
Returned patient's call, advised home sleep study shows very mild AMENA  Scheduled consultation with Dr Mark Anthony Obrien 12/1/22

## 2022-09-28 ENCOUNTER — RA CDI HCC (OUTPATIENT)
Dept: OTHER | Facility: HOSPITAL | Age: 32
End: 2022-09-28

## 2022-10-05 ENCOUNTER — OFFICE VISIT (OUTPATIENT)
Dept: INTERNAL MEDICINE CLINIC | Age: 32
End: 2022-10-05
Payer: COMMERCIAL

## 2022-10-05 VITALS
BODY MASS INDEX: 42.66 KG/M2 | SYSTOLIC BLOOD PRESSURE: 120 MMHG | HEIGHT: 72 IN | HEART RATE: 75 BPM | OXYGEN SATURATION: 98 % | DIASTOLIC BLOOD PRESSURE: 72 MMHG | TEMPERATURE: 98.2 F | WEIGHT: 315 LBS

## 2022-10-05 DIAGNOSIS — F41.0 PANIC ATTACKS: ICD-10-CM

## 2022-10-05 DIAGNOSIS — G47.33 OBSTRUCTIVE SLEEP APNEA: ICD-10-CM

## 2022-10-05 DIAGNOSIS — F33.9 DEPRESSION, RECURRENT (HCC): Primary | ICD-10-CM

## 2022-10-05 DIAGNOSIS — F17.290 OTHER TOBACCO PRODUCT NICOTINE DEPENDENCE, UNCOMPLICATED: ICD-10-CM

## 2022-10-05 DIAGNOSIS — Z00.00 ANNUAL PHYSICAL EXAM: ICD-10-CM

## 2022-10-05 DIAGNOSIS — M54.42 CHRONIC MIDLINE LOW BACK PAIN WITH BILATERAL SCIATICA: ICD-10-CM

## 2022-10-05 DIAGNOSIS — F41.1 GENERALIZED ANXIETY DISORDER: ICD-10-CM

## 2022-10-05 DIAGNOSIS — M54.41 CHRONIC MIDLINE LOW BACK PAIN WITH BILATERAL SCIATICA: ICD-10-CM

## 2022-10-05 DIAGNOSIS — G89.29 CHRONIC MIDLINE LOW BACK PAIN WITH BILATERAL SCIATICA: ICD-10-CM

## 2022-10-05 PROCEDURE — 99214 OFFICE O/P EST MOD 30 MIN: CPT | Performed by: INTERNAL MEDICINE

## 2022-10-05 RX ORDER — FLUOXETINE 10 MG/1
10 TABLET, FILM COATED ORAL DAILY
Qty: 60 TABLET | Refills: 1 | Status: SHIPPED | OUTPATIENT
Start: 2022-10-05

## 2022-10-05 RX ORDER — FLUOXETINE 20 MG/1
20 TABLET, FILM COATED ORAL DAILY
Qty: 60 TABLET | Refills: 1 | Status: SHIPPED | OUTPATIENT
Start: 2022-10-05

## 2022-10-05 NOTE — PROGRESS NOTES
Assessment/Plan:    Depression  -much improved but he believes that the 40 mg of fluoxetine may be too much  -will decrease the dose of Prozac from 40 mg to 30 mg and give him one 20 mg tablet and one 10 mg tablet daily  -he was counseled that the medication will take about 4-6 weeks for its full effect to be observed  -he was counseled to call the office and inform us if he his symptoms are well controlled on 30 mg and we will refill the medication for him   -follow-up in about 4 months for an annual physical exam    Generalized anxiety disorder with panic attacks  -well controlled  -patient was previously on 40 mg of fluoxetine but he believes that the dose is too much  -we will decrease the dose from 40 mg to 30 mg as above  -he was counseled to call the office and inform us if his symptoms are not properly controlled on the decreased dose   -follow-up in 4 months    Chronic low back pain with a history of ankylosing spondylitis  -very well controlled  -continue with p r n  Naproxen  -continue to exercise    Obstructive sleep apnea  -home study done 3 months ago showed mild obstructive sleep apnea but I did discuss with patient that a home sleep study is not as reliable as the lab polysomnography  -he has an option of going for the lab polysomnogram but wants to hold off at this time    Nicotine dependence-cigars  -patient was counseled to quit smoking especially now that he no longer works at his previous job where he was exposed to cigars   -I spent about 3 minutes on smoking cessation counseling    Need for annual physical exam  -will order CBC, CMP, TSH, lipid panel  -follow-up in 4 months for an annual physical         Diagnoses and all orders for this visit:    Depression, recurrent (HCC)  -     FLUoxetine (PROzac) 10 MG tablet; Take 1 tablet (10 mg total) by mouth daily  -     FLUoxetine (PROzac) 20 MG tablet;  Take 1 tablet (20 mg total) by mouth daily  -     TSH, 3rd generation with Free T4 reflex; Future    Generalized anxiety disorder  -     FLUoxetine (PROzac) 10 MG tablet; Take 1 tablet (10 mg total) by mouth daily  -     FLUoxetine (PROzac) 20 MG tablet; Take 1 tablet (20 mg total) by mouth daily  -     TSH, 3rd generation with Free T4 reflex; Future    Panic attacks  -     FLUoxetine (PROzac) 10 MG tablet; Take 1 tablet (10 mg total) by mouth daily  -     FLUoxetine (PROzac) 20 MG tablet; Take 1 tablet (20 mg total) by mouth daily    Chronic midline low back pain with bilateral sciatica    Obstructive sleep apnea  -     Comprehensive metabolic panel; Future    Annual physical exam  -     CBC and differential; Future  -     TSH, 3rd generation with Free T4 reflex; Future  -     Comprehensive metabolic panel; Future  -     Lipid panel; Future    Other tobacco product nicotine dependence, uncomplicated            Tobacco Cessation Counseling: Tobacco cessation counseling was provided  The patient is sincerely urged to quit consumption of tobacco  He is not ready to quit tobacco        Subjective:      Patient ID: Marisol Celestin is a 36 y o  male  HPI  Patient presents for a follow-up visit regarding his anxiety disorder, depression, panic attack, obstructive sleep apnea and chronic low back pain  At his last visit, the dose of his Prozac was changed from 20 mg to 40 mg and he states that he feels like the medication dose may be too much  He states that he has been feeling a lot more tired than before for about a month and a half  He had a home sleep study done about 3 months ago that showed mild sleep apnea  He states that he feels good when he wakes up but in the afternoon he feels tired    Of note, he states that he snores but does not stop breathing(as reported by his wife), no early am headaches   Carlin and depression are better  Has not had to use atarax in about 2 months   Quit his job which was very stressful   Taking time off till the end of the year and feels good about it   Has lost weight and is doing projects around the house  He states that his exercising and eating a healthy diet and taking his multivitamins  Back pain is a 1/10 and he has not taken naproxen in a long time   He denies fever, chills, night sweats, headache, dizziness, chest pain, shortness of breath, palpitations, nausea, vomiting, abdominal pain, diarrhea, constipation, myalgias, arthralgias  He states that his still smokes a little cigar  The following portions of the patient's history were reviewed and updated as appropriate:   He  has a past medical history of Acute torn meniscus of knee, unspecified laterality, initial encounter, Anxiety, COVID-19 (05/14/2022), Depression, IBS (irritable bowel syndrome), and Panic attack  He   Patient Active Problem List    Diagnosis Date Noted    Depression, recurrent ([place] [id number] ) 06/01/2022    Post covid-19 condition, unspecified 06/01/2022    Shakiness 02/09/2022    Dizziness, intermittent 02/09/2022    Obstructive sleep apnea 02/09/2022    Muscle spasm 05/05/2021    Cervical radiculopathy 04/07/2021    Upper back pain, chronic 03/24/2021    Neck pain 03/24/2021    Neuropathy 03/24/2021    Ankylosing spondylitis ([place] [id number] ) 09/23/2020    Dyslipidemia 09/23/2020    Obesity, morbid, BMI 40 0-49 9 ([place] [id number] ) 09/02/2020    Annual physical exam 09/02/2020    Family history of ankylosing spondylitis 09/02/2020    Chronic low back pain with bilateral sciatica 09/02/2020    Generalized anxiety disorder 09/02/2020    Panic attacks 09/02/2020    Nicotine dependence-cigars 09/02/2020    Family history of premature coronary artery disease 09/02/2020     He  has a past surgical history that includes Adenoidectomy  His family history includes Alcohol abuse in his father; Ankylosing spondylitis in his father;  Anxiety disorder in his mother; Bipolar disorder in his father; Cancer in his paternal grandfather; Coronary artery disease in his maternal grandfather and maternal grandmother; Depression in his father, mother, and sister; Diabetes in his maternal grandmother; Drug abuse in his father; Heart attack in his maternal grandfather and maternal grandmother; Hypertension in his maternal grandfather and maternal grandmother; Lung cancer in his paternal grandmother; Substance Abuse in his father and mother  He  reports that he has been smoking cigars  He has smoked for the past 7 00 years  He has never used smokeless tobacco  He reports previous alcohol use  He reports previous drug use  Drug: Marijuana  Current Outpatient Medications   Medication Sig Dispense Refill    cyclobenzaprine (FLEXERIL) 10 mg tablet Take 1 tablet (10 mg total) by mouth daily as needed for muscle spasms 30 tablet 0    FLUoxetine (PROzac) 10 MG tablet Take 1 tablet (10 mg total) by mouth daily 60 tablet 1    FLUoxetine (PROzac) 20 MG tablet Take 1 tablet (20 mg total) by mouth daily 60 tablet 1    hydrOXYzine HCL (ATARAX) 50 mg tablet Take 0 5 tablets (25 mg total) by mouth 3 (three) times a day as needed for anxiety 60 tablet 2    Multiple Vitamin (multivitamin) tablet Take 1 tablet by mouth daily      naproxen (Naprosyn) 500 mg tablet Take 1 tablet (500 mg total) by mouth 2 (two) times a day with meals (Patient taking differently: Take 500 mg by mouth if needed) 40 tablet 1     No current facility-administered medications for this visit       Current Outpatient Medications on File Prior to Visit   Medication Sig    cyclobenzaprine (FLEXERIL) 10 mg tablet Take 1 tablet (10 mg total) by mouth daily as needed for muscle spasms    hydrOXYzine HCL (ATARAX) 50 mg tablet Take 0 5 tablets (25 mg total) by mouth 3 (three) times a day as needed for anxiety    Multiple Vitamin (multivitamin) tablet Take 1 tablet by mouth daily    naproxen (Naprosyn) 500 mg tablet Take 1 tablet (500 mg total) by mouth 2 (two) times a day with meals (Patient taking differently: Take 500 mg by mouth if needed)    [DISCONTINUED] FLUoxetine (PROzac) 40 MG capsule Take 1 capsule (40 mg total) by mouth daily     No current facility-administered medications on file prior to visit  He is allergic to codeine and penicillins       Review of Systems   Constitutional: Positive for fatigue  Negative for activity change, chills, fever and unexpected weight change  HENT: Negative for ear pain, postnasal drip, rhinorrhea, sinus pressure and sore throat  Eyes: Negative for pain  Respiratory: Negative for cough, choking, chest tightness, shortness of breath and wheezing  Cardiovascular: Negative for chest pain, palpitations and leg swelling  Gastrointestinal: Negative for abdominal pain, constipation, diarrhea, nausea and vomiting  Genitourinary: Negative for dysuria and hematuria  Musculoskeletal: Positive for back pain (Very mild)  Negative for arthralgias, gait problem, joint swelling, myalgias and neck stiffness  Skin: Negative for pallor and rash  Neurological: Negative for dizziness, tremors, seizures, syncope, light-headedness and headaches  Hematological: Negative for adenopathy  Psychiatric/Behavioral: Positive for dysphoric mood  Negative for behavioral problems  The patient is nervous/anxious  Objective:      /72 (BP Location: Left arm, Patient Position: Sitting, Cuff Size: Large)   Pulse 75   Temp 98 2 °F (36 8 °C) (Temporal)   Ht 5' 11 5" (1 816 m)   Wt (!) 147 kg (323 lb)   SpO2 98% Comment: room air  BMI 44 42 kg/m²          Physical Exam  Constitutional:       General: He is not in acute distress  Appearance: He is well-developed  He is obese  He is not diaphoretic  Comments: BMI is 44 42   HENT:      Head: Normocephalic and atraumatic  Right Ear: External ear normal       Left Ear: External ear normal       Nose: Nose normal       Mouth/Throat:      Pharynx: Posterior oropharyngeal erythema (Very mild oropharyngeal erythema) present  No oropharyngeal exudate     Eyes:      General: No scleral icterus  Right eye: No discharge  Left eye: No discharge  Conjunctiva/sclera: Conjunctivae normal       Pupils: Pupils are equal, round, and reactive to light  Neck:      Thyroid: No thyromegaly  Vascular: No JVD  Trachea: No tracheal deviation  Cardiovascular:      Rate and Rhythm: Normal rate and regular rhythm  Heart sounds: Normal heart sounds  No murmur heard  No friction rub  No gallop  Pulmonary:      Effort: Pulmonary effort is normal  No respiratory distress  Breath sounds: Decreased breath sounds ( mildly decreased breath sounds in all lung fields) present  No wheezing or rales  Chest:      Chest wall: No tenderness  Abdominal:      General: Bowel sounds are normal  There is no distension  Palpations: Abdomen is soft  There is no mass  Tenderness: There is no abdominal tenderness  There is no guarding or rebound  Musculoskeletal:         General: No tenderness or deformity  Normal range of motion  Cervical back: Normal range of motion and neck supple  Lymphadenopathy:      Cervical: No cervical adenopathy  Skin:     General: Skin is warm and dry  Coloration: Skin is not pale  Findings: No erythema or rash  Neurological:      Mental Status: He is alert and oriented to person, place, and time  Cranial Nerves: No cranial nerve deficit  Motor: No abnormal muscle tone  Coordination: Coordination normal       Deep Tendon Reflexes: Reflexes are normal and symmetric  Psychiatric:         Mood and Affect: Mood is not depressed ( Affect is much improved today compared to last visit)           Behavior: Behavior normal            Appointment on 02/13/2022   Component Date Value Ref Range Status    WBC 02/13/2022 8 79  4 31 - 10 16 Thousand/uL Final    RBC 02/13/2022 5 13  3 88 - 5 62 Million/uL Final    Hemoglobin 02/13/2022 15 2  12 0 - 17 0 g/dL Final    Hematocrit 02/13/2022 46 3  36 5 - 49 3 % Final  MCV 02/13/2022 90  82 - 98 fL Final    MCH 02/13/2022 29 6  26 8 - 34 3 pg Final    MCHC 02/13/2022 32 8  31 4 - 37 4 g/dL Final    RDW 02/13/2022 13 0  11 6 - 15 1 % Final    MPV 02/13/2022 10 7  8 9 - 12 7 fL Final    Platelets 47/86/1734 275  149 - 390 Thousands/uL Final    nRBC 02/13/2022 0  /100 WBCs Final    Neutrophils Relative 02/13/2022 68  43 - 75 % Final    Immat GRANS % 02/13/2022 0  0 - 2 % Final    Lymphocytes Relative 02/13/2022 23  14 - 44 % Final    Monocytes Relative 02/13/2022 8  4 - 12 % Final    Eosinophils Relative 02/13/2022 1  0 - 6 % Final    Basophils Relative 02/13/2022 0  0 - 1 % Final    Neutrophils Absolute 02/13/2022 5 91  1 85 - 7 62 Thousands/µL Final    Immature Grans Absolute 02/13/2022 0 03  0 00 - 0 20 Thousand/uL Final    Lymphocytes Absolute 02/13/2022 2 04  0 60 - 4 47 Thousands/µL Final    Monocytes Absolute 02/13/2022 0 70  0 17 - 1 22 Thousand/µL Final    Eosinophils Absolute 02/13/2022 0 08  0 00 - 0 61 Thousand/µL Final    Basophils Absolute 02/13/2022 0 03  0 00 - 0 10 Thousands/µL Final    TSH 3RD GENERATON 02/13/2022 1 090  0 358 - 3 740 uIU/mL Final    Sodium 02/13/2022 141  136 - 145 mmol/L Final    Potassium 02/13/2022 4 1  3 5 - 5 3 mmol/L Final    Chloride 02/13/2022 111 (A) 100 - 108 mmol/L Final    CO2 02/13/2022 25  21 - 32 mmol/L Final    ANION GAP 02/13/2022 5  4 - 13 mmol/L Final    BUN 02/13/2022 11  5 - 25 mg/dL Final    Creatinine 02/13/2022 0 86  0 60 - 1 30 mg/dL Final    Standardized to IDMS reference method    Glucose, Fasting 02/13/2022 98  65 - 99 mg/dL Final    Specimen collection should occur prior to Sulfasalazine administration due to the potential for falsely depressed results  Specimen collection should occur prior to Sulfapyridine administration due to the potential for falsely elevated results      Calcium 02/13/2022 9 4  8 3 - 10 1 mg/dL Final    AST 02/13/2022 36  5 - 45 U/L Final    Specimen collection should occur prior to Sulfasalazine administration due to the potential for falsely depressed results   ALT 02/13/2022 67  12 - 78 U/L Final    Specimen collection should occur prior to Sulfasalazine and/or Sulfapyridine administration due to the potential for falsely depressed results   Alkaline Phosphatase 02/13/2022 68  46 - 116 U/L Final    Total Protein 02/13/2022 7 7  6 4 - 8 2 g/dL Final    Albumin 02/13/2022 3 7  3 5 - 5 0 g/dL Final    Total Bilirubin 02/13/2022 1 17 (A) 0 20 - 1 00 mg/dL Final    Use of this assay is not recommended for patients undergoing treatment with eltrombopag due to the potential for falsely elevated results   eGFR 02/13/2022 117  ml/min/1 73sq m Final    Cholesterol 02/13/2022 158  See Comment mg/dL Final    Cholesterol:         Pediatric <18 Years        Desirable          <170 mg/dL      Borderline High    170-199 mg/dL      High               >=200 mg/dL        Adult >=18 Years            Desirable         <200 mg/dL      Borderline High   200-239 mg/dL      High              >239 mg/dL      Triglycerides 02/13/2022 119  See Comment mg/dL Final    Triglyceride:     0-9Y            <75mg/dL     10Y-17Y         <90 mg/dL       >=18Y     Normal          <150 mg/dL     Borderline High 150-199 mg/dL     High            200-499 mg/dL        Very High       >499 mg/dL    Specimen collection should occur prior to N-Acetylcysteine or Metamizole administration due to the potential for falsely depressed results   HDL, Direct 02/13/2022 33 (A) >=40 mg/dL Final    Specimen collection should occur prior to Metamizole administration due to the potential for falsley depressed results      LDL Calculated 02/13/2022 101 (A) 0 - 100 mg/dL Final    LDL Cholesterol:     Optimal           <100 mg/dl     Near Optimal      100-129 mg/dl     Above Optimal       Borderline High 130-159 mg/dl       High            160-189 mg/dl       Very High       >189 mg/dl         This screening LDL is a calculated result  It does not have the accuracy of the Direct Measured LDL in the monitoring of patients with hyperlipidemia and/or statin therapy  Direct Measure LDL (HFP400) must be ordered separately in these patients      Non-HDL-Chol (CHOL-HDL) 02/13/2022 125  mg/dl Final    Insulin, Fasting 02/13/2022 19 8  3 0 - 25 0 mU/L Final

## 2022-11-14 ENCOUNTER — APPOINTMENT (EMERGENCY)
Dept: RADIOLOGY | Facility: HOSPITAL | Age: 32
End: 2022-11-14

## 2022-11-14 ENCOUNTER — HOSPITAL ENCOUNTER (EMERGENCY)
Facility: HOSPITAL | Age: 32
Discharge: HOME | End: 2022-11-14
Attending: EMERGENCY MEDICINE | Admitting: EMERGENCY MEDICINE

## 2022-11-14 VITALS
TEMPERATURE: 97.9 F | HEART RATE: 70 BPM | RESPIRATION RATE: 16 BRPM | DIASTOLIC BLOOD PRESSURE: 67 MMHG | SYSTOLIC BLOOD PRESSURE: 133 MMHG | OXYGEN SATURATION: 99 %

## 2022-11-14 DIAGNOSIS — N23 URETERAL COLIC: Primary | ICD-10-CM

## 2022-11-14 LAB
ALBUMIN SERPL BCP-MCNC: 3.6 G/DL (ref 3.5–5)
ALP SERPL-CCNC: 83 U/L (ref 46–116)
ALT SERPL W P-5'-P-CCNC: 69 U/L (ref 12–78)
ANION GAP SERPL CALCULATED.3IONS-SCNC: 12 MMOL/L (ref 4–13)
AST SERPL W P-5'-P-CCNC: 34 U/L (ref 5–45)
BACTERIA UR QL AUTO: ABNORMAL /HPF
BASOPHILS # BLD AUTO: 0.04 THOUSANDS/ÂΜL (ref 0–0.1)
BASOPHILS NFR BLD AUTO: 0 % (ref 0–1)
BILIRUB SERPL-MCNC: 0.64 MG/DL (ref 0.2–1)
BILIRUB UR QL STRIP: NEGATIVE
BUN SERPL-MCNC: 14 MG/DL (ref 5–25)
CALCIUM SERPL-MCNC: 9.6 MG/DL (ref 8.3–10.1)
CHLORIDE SERPL-SCNC: 107 MMOL/L (ref 96–108)
CLARITY UR: ABNORMAL
CO2 SERPL-SCNC: 19 MMOL/L (ref 21–32)
COLOR UR: ABNORMAL
CREAT SERPL-MCNC: 1.12 MG/DL (ref 0.6–1.3)
EOSINOPHIL # BLD AUTO: 0.15 THOUSAND/ÂΜL (ref 0–0.61)
EOSINOPHIL NFR BLD AUTO: 1 % (ref 0–6)
ERYTHROCYTE [DISTWIDTH] IN BLOOD BY AUTOMATED COUNT: 12.7 % (ref 11.6–15.1)
GFR SERPL CREATININE-BSD FRML MDRD: 87 ML/MIN/1.73SQ M
GLUCOSE SERPL-MCNC: 152 MG/DL (ref 65–140)
GLUCOSE UR STRIP-MCNC: NEGATIVE MG/DL
HCT VFR BLD AUTO: 45.3 % (ref 36.5–49.3)
HGB BLD-MCNC: 15.5 G/DL (ref 12–17)
HGB UR QL STRIP.AUTO: ABNORMAL
IMM GRANULOCYTES # BLD AUTO: 0.07 THOUSAND/UL (ref 0–0.2)
IMM GRANULOCYTES NFR BLD AUTO: 0 % (ref 0–2)
KETONES UR STRIP-MCNC: NEGATIVE MG/DL
LEUKOCYTE ESTERASE UR QL STRIP: NEGATIVE
LIPASE SERPL-CCNC: 136 U/L (ref 73–393)
LYMPHOCYTES # BLD AUTO: 4 THOUSANDS/ÂΜL (ref 0.6–4.47)
LYMPHOCYTES NFR BLD AUTO: 25 % (ref 14–44)
MCH RBC QN AUTO: 30.4 PG (ref 26.8–34.3)
MCHC RBC AUTO-ENTMCNC: 34.2 G/DL (ref 31.4–37.4)
MCV RBC AUTO: 89 FL (ref 82–98)
MONOCYTES # BLD AUTO: 1.3 THOUSAND/ÂΜL (ref 0.17–1.22)
MONOCYTES NFR BLD AUTO: 8 % (ref 4–12)
MUCOUS THREADS UR QL AUTO: ABNORMAL
NEUTROPHILS # BLD AUTO: 10.41 THOUSANDS/ÂΜL (ref 1.85–7.62)
NEUTS SEG NFR BLD AUTO: 66 % (ref 43–75)
NITRITE UR QL STRIP: NEGATIVE
NON-SQ EPI CELLS URNS QL MICRO: ABNORMAL /HPF
NRBC BLD AUTO-RTO: 0 /100 WBCS
PH UR STRIP.AUTO: 5.5 [PH] (ref 4.5–8)
PLATELET # BLD AUTO: 333 THOUSANDS/UL (ref 149–390)
PMV BLD AUTO: 10.3 FL (ref 8.9–12.7)
POTASSIUM SERPL-SCNC: 3.8 MMOL/L (ref 3.5–5.3)
PROT SERPL-MCNC: 8 G/DL (ref 6.4–8.4)
PROT UR STRIP-MCNC: NEGATIVE MG/DL
RBC # BLD AUTO: 5.1 MILLION/UL (ref 3.88–5.62)
RBC #/AREA URNS AUTO: ABNORMAL /HPF
SODIUM SERPL-SCNC: 138 MMOL/L (ref 135–147)
SP GR UR STRIP.AUTO: 1.01 (ref 1–1.03)
UROBILINOGEN UR QL STRIP.AUTO: 0.2 E.U./DL
WBC # BLD AUTO: 15.97 THOUSAND/UL (ref 4.31–10.16)
WBC #/AREA URNS AUTO: ABNORMAL /HPF

## 2022-11-14 RX ORDER — OXYCODONE HYDROCHLORIDE 5 MG/1
5 TABLET ORAL EVERY 4 HOURS PRN
Qty: 15 TABLET | Refills: 0 | Status: SHIPPED | OUTPATIENT
Start: 2022-11-14 | End: 2022-11-24

## 2022-11-14 RX ORDER — KETOROLAC TROMETHAMINE 30 MG/ML
15 INJECTION, SOLUTION INTRAMUSCULAR; INTRAVENOUS ONCE
Status: COMPLETED | OUTPATIENT
Start: 2022-11-14 | End: 2022-11-14

## 2022-11-14 RX ORDER — ONDANSETRON 2 MG/ML
4 INJECTION INTRAMUSCULAR; INTRAVENOUS ONCE
Status: COMPLETED | OUTPATIENT
Start: 2022-11-14 | End: 2022-11-14

## 2022-11-14 RX ADMIN — IOHEXOL 100 ML: 350 INJECTION, SOLUTION INTRAVENOUS at 08:50

## 2022-11-14 RX ADMIN — SODIUM CHLORIDE 1000 ML: 0.9 INJECTION, SOLUTION INTRAVENOUS at 07:27

## 2022-11-14 RX ADMIN — KETOROLAC TROMETHAMINE 15 MG: 30 INJECTION, SOLUTION INTRAMUSCULAR at 07:10

## 2022-11-14 RX ADMIN — ONDANSETRON 4 MG: 2 INJECTION INTRAMUSCULAR; INTRAVENOUS at 06:52

## 2022-11-14 NOTE — ED PROVIDER NOTES
Emergency Department Note- Bernice Hancock IV 29 y o  male MRN: [id number]    Unit/Bed#: ED 28 Encounter: [id number]    Chief Complaint   Patient presents with   • Abdominal Pain     LLQ pain  Started 2 hours ago  Sharp, severe  Nonradiating  [id number]        History of Present Illness   HPI:  Bernice Hancock IV is a 29 y o  male with PMH anxiety who presents with sudden onset severe left lower quadrant pain  Patient states for the past 2 days he has had a diarrheal illness, watery nonbloody stool  Associated with chills, no fevers  Approximately 2 hours ago he woke up this sudden-onset left lower quadrant pain associated with nausea and vomiting after the pain started  Pain constant and severe with no radiation  No flank pain  Denies urinary or testicular symptoms  Review of Systems   Constitutional: Negative for chills and fever  HENT: Negative for ear pain and sore throat  Eyes: Negative for pain and visual disturbance  Respiratory: Negative for cough and shortness of breath  Cardiovascular: Negative for chest pain and palpitations  Gastrointestinal: Positive for abdominal pain, diarrhea and vomiting  Genitourinary: Negative for dysuria and hematuria  Musculoskeletal: Negative for arthralgias and back pain  Skin: Negative for color change and rash  Neurological: Negative for seizures and syncope  All other systems reviewed and are negative  All systems reviewed and negative except as noted above or in HPI       No current facility-administered medications for this encounter      Current Outpatient Medications:   •  oxyCODONE (Roxicodone) 5 immediate release tablet, Take 1 tablet (5 mg total) by mouth every 4 (four) hours as needed for moderate pain for up to 10 days Max Daily Amount: 30 mg, Disp: 15 tablet, Rfl: 0  •  cyclobenzaprine (FLEXERIL) 10 mg tablet, Take 1 tablet (10 mg total) by mouth daily as needed for muscle spasms, Disp: 30 tablet, Rfl: 0  •  FLUoxetine (PROzac) 10 MG tablet, Take 1 tablet (10 mg total) by mouth daily, Disp: 60 tablet, Rfl: 1  •  FLUoxetine (PROzac) 20 MG tablet, Take 1 tablet (20 mg total) by mouth daily, Disp: 60 tablet, Rfl: 1  •  hydrOXYzine HCL (ATARAX) 50 mg tablet, Take 0 5 tablets (25 mg total) by mouth 3 (three) times a day as needed for anxiety, Disp: 60 tablet, Rfl: 2  •  Multiple Vitamin (multivitamin) tablet, Take 1 tablet by mouth daily, Disp: , Rfl:   •  naproxen (Naprosyn) 500 mg tablet, Take 1 tablet (500 mg total) by mouth 2 (two) times a day with meals (Patient taking differently: Take 500 mg by mouth if needed), Disp: 40 tablet, Rfl: 1    Historical Information   Past Medical History:   Diagnosis Date   • Acute torn meniscus of knee, unspecified laterality, initial encounter    • Anxiety    • COVID-19 05/14/2022    tested positive on at home test   • Depression    • IBS (irritable bowel syndrome)    • Panic attack      Past Surgical History:   Procedure Laterality Date   • ADENOIDECTOMY       Social History   Social History     Substance and Sexual Activity   Alcohol Use Not Currently     Social History     Substance and Sexual Activity   Drug Use Not Currently   • Types: Marijuana     Social History     Tobacco Use   Smoking Status Current Some Day Smoker   • Years: 7 00   • Types: Cigars   Smokeless Tobacco Never Used   Tobacco Comment    smokes cigars--about 3 per week/Never smoker     Family History:   Family History   Problem Relation Age of Onset   • Substance Abuse Mother    • Depression Mother    • Anxiety disorder Mother    • Substance Abuse Father    • Ankylosing spondylitis Father    • Bipolar disorder Father    • Alcohol abuse Father    • Depression Father    • Drug abuse Father    • Cancer Paternal Grandfather    • Depression Sister    • Heart attack Maternal Grandmother    • Coronary artery disease Maternal Grandmother    • Diabetes Maternal Grandmother    • Hypertension Maternal Grandmother    • Heart attack Maternal Grandfather    • Coronary artery disease Maternal Grandfather    • Hypertension Maternal Grandfather    • Lung cancer Paternal Grandmother    • Thyroid cancer Neg Hx        Meds/Allergies   (Not in a hospital admission)    Allergies   Allergen Reactions   • Codeine Other (See Comments)     Unknown   • Penicillins Other (See Comments)     Unknown       Objective   Vitals: Blood pressure 133/67, pulse 70, temperature 97 9 °F (36 6 °C), temperature source Oral, resp  rate 16, SpO2 99 %  Physical Exam  Vitals and nursing note reviewed  Constitutional:       General: He is not in acute distress  Appearance: He is well-developed  He is not toxic-appearing  HENT:      Head: Normocephalic and atraumatic  Right Ear: External ear normal       Left Ear: External ear normal       Nose: Nose normal       Mouth/Throat:      Pharynx: Oropharynx is clear  No oropharyngeal exudate or posterior oropharyngeal erythema  Eyes:      Extraocular Movements: Extraocular movements intact  Conjunctiva/sclera: Conjunctivae normal       Pupils: Pupils are equal, round, and reactive to light  Cardiovascular:      Rate and Rhythm: Normal rate and regular rhythm  Pulses: Normal pulses  Heart sounds: Normal heart sounds  No murmur heard  No friction rub  No gallop  Pulmonary:      Effort: Pulmonary effort is normal  No respiratory distress  Breath sounds: Normal breath sounds  No wheezing, rhonchi or rales  Abdominal:      General: Abdomen is flat  Bowel sounds are normal       Palpations: Abdomen is soft  Tenderness: There is abdominal tenderness in the left lower quadrant  There is no guarding or rebound  Musculoskeletal:         General: Normal range of motion  Cervical back: Normal range of motion  No rigidity  Right lower leg: No edema  Left lower leg: No edema  Skin:     General: Skin is warm and dry  Capillary Refill: Capillary refill takes less than 2 seconds  Neurological:      General: No focal deficit present  Mental Status: He is alert  Psychiatric:         Mood and Affect: Mood normal             Lab Results: Lab Results: I have personally reviewed pertinent lab results  Imaging: I have personally reviewed pertinent reports      EKG, Pathology, and Other Studies: I have personally reviewed pertinent films in PACS    Labs Reviewed   CBC AND DIFFERENTIAL - Abnormal       Result Value Ref Range Status    WBC 15 97 (*) 4 31 - 10 16 Thousand/uL Final    RBC 5 10  3 88 - 5 62 Million/uL Final    Hemoglobin 15 5  12 0 - 17 0 g/dL Final    Hematocrit 45 3  36 5 - 49 3 % Final    MCV 89  82 - 98 fL Final    MCH 30 4  26 8 - 34 3 pg Final    MCHC 34 2  31 4 - 37 4 g/dL Final    RDW 12 7  11 6 - 15 1 % Final    MPV 10 3  8 9 - 12 7 fL Final    Platelets 296  788 - 390 Thousands/uL Final    nRBC 0  /100 WBCs Final    Neutrophils Relative 66  43 - 75 % Final    Immat GRANS % 0  0 - 2 % Final    Lymphocytes Relative 25  14 - 44 % Final    Monocytes Relative 8  4 - 12 % Final    Eosinophils Relative 1  0 - 6 % Final    Basophils Relative 0  0 - 1 % Final    Neutrophils Absolute 10 41 (*) 1 85 - 7 62 Thousands/µL Final    Immature Grans Absolute 0 07  0 00 - 0 20 Thousand/uL Final    Lymphocytes Absolute 4 00  0 60 - 4 47 Thousands/µL Final    Monocytes Absolute 1 30 (*) 0 17 - 1 22 Thousand/µL Final    Eosinophils Absolute 0 15  0 00 - 0 61 Thousand/µL Final    Basophils Absolute 0 04  0 00 - 0 10 Thousands/µL Final   COMPREHENSIVE METABOLIC PANEL - Abnormal    Sodium 138  135 - 147 mmol/L Final    Potassium 3 8  3 5 - 5 3 mmol/L Final    Chloride 107  96 - 108 mmol/L Final    CO2 19 (*) 21 - 32 mmol/L Final    ANION GAP 12  4 - 13 mmol/L Final    BUN 14  5 - 25 mg/dL Final    Creatinine 1 12  0 60 - 1 30 mg/dL Final    Comment: Standardized to IDMS reference method    Glucose 152 (*) 65 - 140 mg/dL Final    Comment: If the patient is fasting, the ADA then defines impaired fasting glucose as > 100 mg/dL and diabetes as > or equal to 123 mg/dL  Specimen collection should occur prior to Sulfasalazine administration due to the potential for falsely depressed results  Specimen collection should occur prior to Sulfapyridine administration due to the potential for falsely elevated results  Calcium 9 6  8 3 - 10 1 mg/dL Final    AST 34  5 - 45 U/L Final    Comment: Specimen collection should occur prior to Sulfasalazine administration due to the potential for falsely depressed results  ALT 69  12 - 78 U/L Final    Comment: Specimen collection should occur prior to Sulfasalazine and/or Sulfapyridine administration due to the potential for falsely depressed results  Alkaline Phosphatase 83  46 - 116 U/L Final    Total Protein 8 0  6 4 - 8 4 g/dL Final    Albumin 3 6  3 5 - 5 0 g/dL Final    Total Bilirubin 0 64  0 20 - 1 00 mg/dL Final    Comment: Use of this assay is not recommended for patients undergoing treatment with eltrombopag due to the potential for falsely elevated results      eGFR 87  ml/min/1 73sq m Final    Narrative:     [place] guidelines for Chronic Kidney Disease (CKD):   •  Stage 1 with normal or high GFR (GFR > 90 mL/min/1 73 square meters)  •  Stage 2 Mild CKD (GFR = 60-89 mL/min/1 73 square meters)  •  Stage 3A Moderate CKD (GFR = 45-59 mL/min/1 73 square meters)  •  Stage 3B Moderate CKD (GFR = 30-44 mL/min/1 73 square meters)  •  Stage 4 Severe CKD (GFR = 15-29 mL/min/1 73 square meters)  •  Stage 5 End Stage CKD (GFR <15 mL/min/1 73 square meters)  Note: GFR calculation is accurate only with a steady state creatinine   URINE MACROSCOPIC, POC - Abnormal    Color, UA Gabrielle   Final    Clarity, UA Slightly Cloudy   Final    pH, UA 5 5  4 5 - 8 0 Final    Leukocytes, UA Negative  Negative Final    Nitrite, UA Negative  Negative Final    Protein, UA Negative  Negative mg/dl Final    Glucose, UA Negative  Negative mg/dl Final Ketones, UA Negative  Negative mg/dl Final    Urobilinogen, UA 0 2  0 2, 1 0 E U /dl E U /dl Final    Bilirubin, UA Negative  Negative Final    Occult Blood, UA Large (*) Negative Final    Specific [place], UA 1 015  1 003 - 1 030 Final    Narrative:     CLINITEK RESULT   LIPASE - Normal    Lipase 136  73 - 393 u/L Final      CT abdomen pelvis with contrast   Final Result      2 mm calculus in the left UPJ with mild hydronephrosis  No perinephric collection  Bilateral renal calculi, 4 mm and smaller on the left and 2 mm and smaller on the right  This study demonstrates a significant  finding and was documented as such in [facility] for liaison and referring practitioner notification  Workstation performed: [id number]              Medications   ondansetron [facility]) injection 4 mg (4 mg Intravenous Given 11/14/22 7303)   ketorolac (TORADOL) injection 15 mg (15 mg Intravenous Given 11/14/22 8410)   sodium chloride 0 9 % bolus 1,000 mL (0 mL Intravenous Stopped 11/14/22 0945)   iohexol (OMNIPAQUE) 350 MG/ML injection (SINGLE-DOSE) 100 mL (100 mL Intravenous Given 11/14/22 0850)                Procedures                            Assessment/Plan     ED Medical Decision Making:  Patient is a 82-year-old male presenting with acute onset left lower quadrant abdominal pain  Vital signs reviewed  Exam significant for left lower quadrant abdominal tenderness  Possibly diverticulitis versus ureteral colic from nephrolithiasis, plan for pain control, labs, CT  Chest CT revealing of small ureteral stone on the left side, most likely source of patient's pain  Patient feeling better after Toradol  Informed patient of the findings of CT including intrarenal stones  Recommended straining urine, return to the emergency department if develops fever, follow-up with urology in the next week      Time reflects when diagnosis was documented in both MDM as applicable and the Disposition within this note     Time User Action Codes Description Comment    11/14/2022  9:10 AM Luke Butler Add [D48] Ureteral colic       ED Disposition     ED Disposition   Discharge    Condition   Stable    Date/Time   Mon Nov 14, 2022  9:22 AM    Comment   Temo Carbone IV discharge to home/self care  Follow-up Information     Follow up With Specialties Details Why Contact Info Additional [address] [facility]   [address] [facility] [address] [phone] [facility], [address], [place], [place], [address]         Discharge Medication List as of 11/14/2022  9:50 AM      START taking these medications    Details   oxyCODONE (Roxicodone) 5 immediate release tablet Take 1 tablet (5 mg total) by mouth every 4 (four) hours as needed for moderate pain for up to 10 days Max Daily Amount: 30 mg, Starting Mon 11/14/2022, Until Thu 11/24/2022 at 2359, Normal                Current Discharge Medication List      CONTINUE these medications which have NOT CHANGED    Details   cyclobenzaprine (FLEXERIL) 10 mg tablet Take 1 tablet (10 mg total) by mouth daily as needed for muscle spasms  Qty: 30 tablet, Refills: 0    Associated Diagnoses: Muscle spasm      !! FLUoxetine (PROzac) 10 MG tablet Take 1 tablet (10 mg total) by mouth daily  Qty: 60 tablet, Refills: 1    Associated Diagnoses: Depression, recurrent ([address] ); Generalized anxiety disorder; Panic attacks      !! FLUoxetine (PROzac) 20 MG tablet Take 1 tablet (20 mg total) by mouth daily  Qty: 60 tablet, Refills: 1    Associated Diagnoses: Depression, recurrent ([address] );  Generalized anxiety disorder; Panic attacks      hydrOXYzine HCL (ATARAX) 50 mg tablet Take 0 5 tablets (25 mg total) by mouth 3 (three) times a day as needed for anxiety  Qty: 60 tablet, Refills: 2    Associated Diagnoses: Generalized anxiety disorder      Multiple Vitamin (multivitamin) tablet Take 1 tablet by mouth daily      naproxen (Naprosyn) 500 mg tablet Take 1 tablet (500 mg total) by mouth 2 (two) times a day with meals  Qty: 40 tablet, Refills: 1    Associated Diagnoses: Neck pain       !! - Potential duplicate medications found  Please discuss with provider  Portions of the record may have been created with voice recognition software  Occasional wrong word or "sound a like" substitutions may have occurred due to the inherent limitations of voice recognition software  Read the chart carefully and recognize, using context, where substitutions have occurred         Claudetta Hay, MD  11/15/22 2064

## 2022-11-14 NOTE — DISCHARGE INSTRUCTIONS
Try to take the pain medication only as needed  Strain your urine and follow up with urology regardless if you catch your stone in the next week  If you develop fever, please return to the ED for further evaluation  If you develop new or worsening symptoms, please return to the Emergency Department for further evaluation

## 2022-11-14 NOTE — ED ATTENDING ATTESTATION
11/14/2022  IFrancoise MD, saw and evaluated the patient  I have discussed the patient with the resident/non-physician practitioner and agree with the resident's/non-physician practitioner's findings, Plan of Care, and MDM as documented in the resident's/non-physician practitioner's note, except where noted  All available labs and Radiology studies were reviewed  I was present for key portions of any procedure(s) performed by the resident/non-physician practitioner and I was immediately available to provide assistance  At this point I agree with the current assessment done in the Emergency Department  I have conducted an independent evaluation of this patient a history and physical is as follows:    ED Course     53-year-old male presenting to the emergency department for evaluation of left lower quadrant abdominal pain  Patient states that he has been sick for the past 2 days with multiple episodes of watery nonbloody diarrhea  Patient states that this morning he woke with severe left lower quadrant abdominal discomfort which has been present for the past 2 hours  No alleviating or exacerbating factors  Pain is noted to be sharp  Nonradiating  Patient has associated nausea without vomiting  No dysuria, urinary frequency, or urgency  No fever  Patient has not been able to eat or drink much over the past 2 days because he has been feeling unwell  Ten systems reviewed and negative except as noted in the history of present illness  The patient is resting comfortably on a stretcher in no acute respiratory distress  The patient appears nontoxic  HEENT reveals moist mucous membranes  Head is normocephalic and atraumatic  Conjunctiva and sclera are normal  Neck is nontender and supple with full range of motion to flexion, extension, lateral rotation  No meningismus appreciated  No masses are appreciated  Lungs are clear to auscultation bilaterally without any wheezes, rales or rhonchi  Heart is regular rate and rhythm without any murmurs, rubs or gallops  Abdomen is soft with focal left lower quadrant abdominal tenderness without any rebound or guarding  Extremities appear grossly normal without any significant arthropathy  Patient is awake, alert, and oriented x3  The patient has normal interaction  Motor is 5 out of 5      Labs Reviewed   CBC AND DIFFERENTIAL - Abnormal       Result Value Ref Range Status    WBC 15 97 (*) 4 31 - 10 16 Thousand/uL Final    RBC 5 10  3 88 - 5 62 Million/uL Final    Hemoglobin 15 5  12 0 - 17 0 g/dL Final    Hematocrit 45 3  36 5 - 49 3 % Final    MCV 89  82 - 98 fL Final    MCH 30 4  26 8 - 34 3 pg Final    MCHC 34 2  31 4 - 37 4 g/dL Final    RDW 12 7  11 6 - 15 1 % Final    MPV 10 3  8 9 - 12 7 fL Final    Platelets 520  336 - 390 Thousands/uL Final    nRBC 0  /100 WBCs Final    Neutrophils Relative 66  43 - 75 % Final    Immat GRANS % 0  0 - 2 % Final    Lymphocytes Relative 25  14 - 44 % Final    Monocytes Relative 8  4 - 12 % Final    Eosinophils Relative 1  0 - 6 % Final    Basophils Relative 0  0 - 1 % Final    Neutrophils Absolute 10 41 (*) 1 85 - 7 62 Thousands/µL Final    Immature Grans Absolute 0 07  0 00 - 0 20 Thousand/uL Final    Lymphocytes Absolute 4 00  0 60 - 4 47 Thousands/µL Final    Monocytes Absolute 1 30 (*) 0 17 - 1 22 Thousand/µL Final    Eosinophils Absolute 0 15  0 00 - 0 61 Thousand/µL Final    Basophils Absolute 0 04  0 00 - 0 10 Thousands/µL Final   COMPREHENSIVE METABOLIC PANEL - Abnormal    Sodium 138  135 - 147 mmol/L Final    Potassium 3 8  3 5 - 5 3 mmol/L Final    Chloride 107  96 - 108 mmol/L Final    CO2 19 (*) 21 - 32 mmol/L Final    ANION GAP 12  4 - 13 mmol/L Final    BUN 14  5 - 25 mg/dL Final    Creatinine 1 12  0 60 - 1 30 mg/dL Final    Comment: Standardized to IDMS reference method    Glucose 152 (*) 65 - 140 mg/dL Final    Comment: If the patient is fasting, the ADA then defines impaired fasting glucose as > 100 mg/dL and diabetes as > or equal to 123 mg/dL  Specimen collection should occur prior to Sulfasalazine administration due to the potential for falsely depressed results  Specimen collection should occur prior to Sulfapyridine administration due to the potential for falsely elevated results  Calcium 9 6  8 3 - 10 1 mg/dL Final    AST 34  5 - 45 U/L Final    Comment: Specimen collection should occur prior to Sulfasalazine administration due to the potential for falsely depressed results  ALT 69  12 - 78 U/L Final    Comment: Specimen collection should occur prior to Sulfasalazine and/or Sulfapyridine administration due to the potential for falsely depressed results  Alkaline Phosphatase 83  46 - 116 U/L Final    Total Protein 8 0  6 4 - 8 4 g/dL Final    Albumin 3 6  3 5 - 5 0 g/dL Final    Total Bilirubin 0 64  0 20 - 1 00 mg/dL Final    Comment: Use of this assay is not recommended for patients undergoing treatment with eltrombopag due to the potential for falsely elevated results  eGFR 87  ml/min/1 73sq m Final    Narrative:     [place] guidelines for Chronic Kidney Disease (CKD):   •  Stage 1 with normal or high GFR (GFR > 90 mL/min/1 73 square meters)  •  Stage 2 Mild CKD (GFR = 60-89 mL/min/1 73 square meters)  •  Stage 3A Moderate CKD (GFR = 45-59 mL/min/1 73 square meters)  •  Stage 3B Moderate CKD (GFR = 30-44 mL/min/1 73 square meters)  •  Stage 4 Severe CKD (GFR = 15-29 mL/min/1 73 square meters)  •  Stage 5 End Stage CKD (GFR <15 mL/min/1 73 square meters)  Note: GFR calculation is accurate only with a steady state creatinine   LIPASE - Normal    Lipase 136  73 - 393 u/L Final       CT abdomen pelvis with contrast   Final Result      2 mm calculus in the left UPJ with mild hydronephrosis  No perinephric collection  Bilateral renal calculi, 4 mm and smaller on the left and 2 mm and smaller on the right        This study demonstrates a significant  finding and was documented as such in Epic for liaison and referring practitioner notification         Workstation performed: [id number]                       Critical Care Time  Procedures

## 2022-11-15 LAB — BACTERIA UR CULT: NORMAL

## 2022-11-22 ENCOUNTER — TELEPHONE (OUTPATIENT)
Dept: UROLOGY | Facility: MEDICAL CENTER | Age: 32
End: 2022-11-22

## 2022-11-22 NOTE — TELEPHONE ENCOUNTER
Please Triage  New Patient    What is the reason for the patient’s appointment? Pt called the office to schedule a NP appt for kidney stone  Pt was at the ER and had CT scan that showed 2 mm calculus in the left UPJ with mild hydronephrosis  No perinephric collection      Bilateral renal calculi, 4 mm and smaller on the left and 2 mm and smaller on the right        What office location does the patient prefer? [place]     Imaging/Lab Results: Ct Scan     Do we accept the patient's insurance or is the patient Self-Pay? Insurance Provider: [employer]   Plan Type/Number:  Member ID#: Has the patient had any previous Urologist(s)? no    Have patient records been requested? If not are records showing in Epic: in epic     Has the patient had any outside testing done? In epic     Does the patient have a personal history of cancer?  No    Pt can be reached at [phone]

## 2022-11-27 DIAGNOSIS — F41.1 GENERALIZED ANXIETY DISORDER: ICD-10-CM

## 2022-11-27 DIAGNOSIS — F41.0 PANIC ATTACKS: ICD-10-CM

## 2022-11-27 DIAGNOSIS — F33.9 DEPRESSION, RECURRENT (HCC): ICD-10-CM

## 2022-11-28 RX ORDER — FLUOXETINE 20 MG/1
TABLET, FILM COATED ORAL
Qty: 90 TABLET | Refills: 1 | Status: SHIPPED | OUTPATIENT
Start: 2022-11-28

## 2022-11-28 RX ORDER — FLUOXETINE 10 MG/1
TABLET, FILM COATED ORAL
Qty: 90 TABLET | Refills: 1 | Status: SHIPPED | OUTPATIENT
Start: 2022-11-28

## 2022-11-28 NOTE — TELEPHONE ENCOUNTER
LOV 10/5/22  NOV 2/16/23    Spoke with patient  Prozac 30 mg is helping   Would like to continue dosage

## 2023-01-04 ENCOUNTER — OFFICE VISIT (OUTPATIENT)
Dept: UROLOGY | Facility: AMBULATORY SURGERY CENTER | Age: 33
End: 2023-01-04

## 2023-01-04 VITALS
OXYGEN SATURATION: 95 % | HEIGHT: 71 IN | SYSTOLIC BLOOD PRESSURE: 132 MMHG | DIASTOLIC BLOOD PRESSURE: 78 MMHG | RESPIRATION RATE: 18 BRPM | HEART RATE: 78 BPM | WEIGHT: 315 LBS | BODY MASS INDEX: 44.1 KG/M2

## 2023-01-04 DIAGNOSIS — N20.0 CALCULUS OF KIDNEY: Primary | ICD-10-CM

## 2023-01-04 DIAGNOSIS — N23 URETERAL COLIC: ICD-10-CM

## 2023-01-04 NOTE — PROGRESS NOTES
01/04/23    Ally Merino IV   1990   [id number]     Assessment  1 Nephrolithiasis     Discussion/Plan  1 Nephrolithiasis    11/14/22 CT abdomen: 2 mm calculus in the left UPJ with mild hydronephrosis  No perinephric collection  Bilateral renal calculi, 4 mm and smaller on the left and 2 mm and smaller on the right  Educational packet provided, dietary recommendations reviewed at length, increase hydration    ER precautions reviewed   Renal US ordered to monitor resolution of hydronephrosis    Consider referral to Nephrology for metabolic work up     Patient will scheduling follow up imaging in February 2023 when his new insurance plan becomes active  He wishes to follow up annually with imaging to monitor his stone burden  All questions answered at this time       Subjective  HPI   Valdez Chris IV is a 29year old male who presents in consultation for evaluation of nephrolithiasis  He was seen in the ER 11/14/22 with sudden onset of severe LLQ pain, diarrhea, nausea/vomiting  CT revealed a 2 mm stone at the left UPJ and bilateral stone burden  He was given Zofran and Toradol for symptom control and discharged home with pain medication  Urine studies were negative for infection  He states he has felt "fine" since returning from the ER  He denies pain, fever, chills, nausea, vomiting or gross hematuria  He believes he passed the stone spontaneously  This was his first experience with kidney stones  He stated he "passed out" from the pain  He denies family history of stones  He does not consume soda or iced teas  He states he hydrates adequately with water       Review of Systems - History obtained from chart review and the patient  General ROS: negative  Psychological ROS: negative  Respiratory ROS: no cough, shortness of breath, or wheezing  Cardiovascular ROS: no chest pain or dyspnea on exertion  Gastrointestinal ROS: no abdominal pain, change in bowel habits, or black or bloody stools  Genito-Urinary ROS: no dysuria, trouble voiding, or hematuria  Musculoskeletal ROS: negative  Neurological ROS: negative  Dermatological ROS: negative       Objective  Physical Exam  Vitals and nursing note reviewed  Constitutional:       General: He is awake  He is not in acute distress  Appearance: Normal appearance  He is well-developed, well-groomed and normal weight  He is not ill-appearing, toxic-appearing or diaphoretic  Pulmonary:      Effort: Pulmonary effort is normal    Abdominal:      Tenderness: There is no right CVA tenderness or left CVA tenderness  Musculoskeletal:         General: Normal range of motion  Cervical back: Normal range of motion and neck supple  Skin:     General: Skin is warm  Neurological:      General: No focal deficit present  Mental Status: He is alert and oriented to person, place, and time  Mental status is at baseline  Psychiatric:         Attention and Perception: Attention normal          Mood and Affect: Mood normal          Speech: Speech normal          Behavior: Behavior normal  Behavior is cooperative  Thought Content: Thought content normal          Cognition and Memory: Cognition normal          Judgment: Judgment normal              CT ABDOMEN AND PELVIS WITH IV CONTRAST     INDICATION:   LLQ abdominal pain  sudden onset severe LLQ pain, recent diarrheal illness      COMPARISON:  CT abdomen and pelvis 12/26/2013     TECHNIQUE:  CT examination of the abdomen and pelvis was performed  Axial, sagittal, and coronal 2D reformatted images were created from the source data and submitted for interpretation      Radiation dose length product (DLP) for this visit:  1401 53 mGy-cm     This examination, like all CT scans performed in the [facility], was performed utilizing techniques to minimize radiation dose exposure, including the use of   iterative reconstruction and automated exposure control      IV Contrast:  100 mL of iohexol (OMNIPAQUE) 350  Enteric Contrast:  Enteric contrast was not administered      FINDINGS:     ABDOMEN     LOWER CHEST:  No clinically significant abnormality identified in the visualized lower chest      LIVER/BILIARY TREE:  Unremarkable      GALLBLADDER:  No calcified gallstones  No pericholecystic inflammatory change      SPLEEN:  Unremarkable      PANCREAS:  Unremarkable      ADRENAL GLANDS:  Unremarkable      KIDNEYS/URETERS: 2 mm calculus in the left UPJ with mild upstream hydronephrosis  No perinephric collection  Several left renal calculi, 4 mm and smaller      Right renal nonobstructing calculi, 2 mm and smaller      One or more sharply circumscribed subcentimeter renal hypodensities are noted  These lesions are too small to accurately characterize, but are statistically most likely to represent benign cortical renal cyst(s)  According to the guidelines published in   the [facility] Paper of the ACR Incidental Findings Committee (Radiology 2010), no further workup of these lesions is recommended      STOMACH AND BOWEL:  Unremarkable      APPENDIX:  A normal appendix was visualized      ABDOMINOPELVIC CAVITY:  No ascites  No pneumoperitoneum  No lymphadenopathy      VESSELS:  Unremarkable for patient's age      PELVIS     REPRODUCTIVE ORGANS:  Unremarkable for patient's age      URINARY BLADDER:  Unremarkable      ABDOMINAL WALL/INGUINAL REGIONS:  Small fat-containing umbilical hernia      OSSEOUS STRUCTURES:  No acute fracture or osseous destructive lesion identified  Mild degenerative changes of the spine      IMPRESSION:     2 mm calculus in the left UPJ with mild hydronephrosis  No perinephric collection      Bilateral renal calculi, 4 mm and smaller on the left and 2 mm and smaller on the right      This study demonstrates a significant  finding and was documented as such in [facility] for liaison and referring practitioner notification          ANKIT Rosales     I have spent 15 minutes with Patient  today in which greater than 50% of this time was spent in counseling/coordination of care regarding Intructions for management

## 2023-06-07 ENCOUNTER — OFFICE VISIT (OUTPATIENT)
Dept: INTERNAL MEDICINE CLINIC | Age: 33
End: 2023-06-07
Payer: COMMERCIAL

## 2023-06-07 VITALS
DIASTOLIC BLOOD PRESSURE: 72 MMHG | SYSTOLIC BLOOD PRESSURE: 128 MMHG | OXYGEN SATURATION: 98 % | HEIGHT: 71 IN | HEART RATE: 62 BPM | TEMPERATURE: 98.4 F | BODY MASS INDEX: 45.05 KG/M2

## 2023-06-07 DIAGNOSIS — F33.9 DEPRESSION, RECURRENT (HCC): ICD-10-CM

## 2023-06-07 DIAGNOSIS — F41.1 GENERALIZED ANXIETY DISORDER: ICD-10-CM

## 2023-06-07 DIAGNOSIS — E78.5 DYSLIPIDEMIA: ICD-10-CM

## 2023-06-07 DIAGNOSIS — G47.33 OBSTRUCTIVE SLEEP APNEA: ICD-10-CM

## 2023-06-07 DIAGNOSIS — F17.290 OTHER TOBACCO PRODUCT NICOTINE DEPENDENCE, UNCOMPLICATED: ICD-10-CM

## 2023-06-07 DIAGNOSIS — Z00.00 ANNUAL PHYSICAL EXAM: Primary | ICD-10-CM

## 2023-06-07 DIAGNOSIS — F41.0 PANIC ATTACKS: ICD-10-CM

## 2023-06-07 DIAGNOSIS — E66.01 OBESITY, CLASS III, BMI 40-49.9 (MORBID OBESITY) (HCC): ICD-10-CM

## 2023-06-07 DIAGNOSIS — M45.9 ANKYLOSING SPONDYLITIS, UNSPECIFIED SITE OF SPINE (HCC): ICD-10-CM

## 2023-06-07 PROBLEM — E66.813 OBESITY, CLASS III, BMI 40-49.9 (MORBID OBESITY): Status: ACTIVE | Noted: 2020-09-02

## 2023-06-07 PROCEDURE — 99214 OFFICE O/P EST MOD 30 MIN: CPT | Performed by: INTERNAL MEDICINE

## 2023-06-07 PROCEDURE — 99395 PREV VISIT EST AGE 18-39: CPT | Performed by: INTERNAL MEDICINE

## 2023-06-07 NOTE — PROGRESS NOTES
Assessment/Plan:    Annual physical examination  - History and physical examination done  - Pt was counseled to eat a heart healthy diet, to drink at least 2 L of water daily, to take a daily multivitamin and to exercise for at least 30 minutes of cardio exercise daily, for at least 5 days a week  - CBC, CMP, TSH and lipid panel were ordered on 10/5/2022 and lab scripts were printed for patient today and he was urged to get his blood work done   -He is up-to-date with 3 COVID vaccines but not his Tdap  - follow up in 6 months       Diagnoses and all orders for this visit:    Annual physical exam    Generalized anxiety disorder  -     Ambulatory Referral to Psychiatry; Future  -     Ambulatory Referral to Psychology; Future    Obstructive sleep apnea    Ankylosing spondylitis, unspecified site of spine ([place] [address] )    Panic attacks  -     Ambulatory Referral to Psychiatry; Future  -     Ambulatory Referral to Psychology; Future    Other tobacco product nicotine dependence, uncomplicated    Dyslipidemia    Depression, recurrent ([facility] )  -     Ambulatory Referral to Psychiatry; Future  -     Ambulatory Referral to Psychology; Future    Obesity, Class III, BMI 40-49 9 (morbid obesity) ([facility])          Subjective:      Patient ID: Janelle Guerrero is a 29 y o  male  HPI    Patient presents for an annual physical exam     Last annual physical exam- 2/9/22    Past medical history-anxiety and depression, started at age 15  Seasonal allergies and chronic back pain, nephrolithiaisis      Past surgical history- adenoidectomy and ?  Tonsillectomy    Medications- see list    Allergies-Penicillin,- ? Reaction   codeine - anaphylaxis    Diet- poor diet with a lot of junk, drinks a lot of water    Exercise- none    Alcohol use- none    Caffeine and soda use-one cup of coffee daily, not really soda or tea    Nicotine use- occasionally cigar - about once week    Recreational drug use- stopped marijuana    Work- marketing work    Sexual history, STD history and HIV testing-monogamous with wife, std hx - never, hiv testing - donated blood about 5 years    Gynecological history/Prostate health/testicular health history- testicular self exam discussed    Colonoscopy- n/a    Immunization history- up to date with the covid shot x 3, but not the tdap    Dental visit- has no t gone in a while    Vision- wears glasses - myopia and hypermetropia with astigmatism   Has been wearing glasses since he was a child  Family history-  htn - everybody,   Anxiety disorder-most family members  Bipolar disorder-dad  dm - ? Aunt,   early onset heart disease with MI - both maternal grandparents, dad's dad, lung cancer - grandma(a smoker),   ankylosing spondylitis - dad, dad is in a wheel chair,   substance abuse - dad( states that he abuses everything)    Today, patient states that he was taking Prozac and was seeing a counselor for his anxiety and depression but he believes that he  has had fatigue secondary to the meds with excessive sleepiness so he stopped the prozac abruptly  He states that he felt briefly worse after he stopped the Prozac but his symptoms have stabilized now  He states that he has not been able to see his Counsellor and has been trying to get a psychiatrist or psychotherapist but has not been able to get in and wants to see if we can help him see a psychiatrist sooner rather than later  Of note, he states that he wants a specialist that we will know the medication to put him on without trial and error of different medications  Of note, he states that he has tried other meds in the past but cannot remember the names of the medications  He has not been able to get records from his previous psychiatrist   He states that his back pain is stable    He admits to occasional dizziness, but denies fever, chills, night sweats, headache, nasal congestion, rhinorrhea, cough, chest pain, shortness of breath, palpitations, nausea, vomiting, abdominal pain, diarrhea, constipation, myalgias, arthralgias  Patient presents for a follow-up visit regarding his generalized anxiety disorder, obstructive sleep apnea, ankylosing spondylitis, dyslipidemia and depression  He states that he has not been taking his medications  The following portions of the patient's history were reviewed and updated as appropriate:   He  has a past medical history of Acute torn meniscus of knee, unspecified laterality, initial encounter, Anxiety, COVID-19 (05/14/2022), Depression, IBS (irritable bowel syndrome), and Panic attack  He   Patient Active Problem List    Diagnosis Date Noted   • Depression, recurrent ([place] [address] ) 06/01/2022   • Post covid-19 condition, unspecified 06/01/2022   • Shakiness 02/09/2022   • Dizziness, intermittent 02/09/2022   • Obstructive sleep apnea 02/09/2022   • Muscle spasm 05/05/2021   • Cervical radiculopathy 04/07/2021   • Upper back pain, chronic 03/24/2021   • Neck pain 03/24/2021   • Neuropathy 03/24/2021   • Ankylosing spondylitis ([place] [address] ) 09/23/2020   • Dyslipidemia 09/23/2020   • Obesity, Class III, BMI 40-49 9 (morbid obesity) ([place] [address] ) 09/02/2020   • Annual physical exam 09/02/2020   • Family history of ankylosing spondylitis 09/02/2020   • Chronic low back pain with bilateral sciatica 09/02/2020   • Generalized anxiety disorder 09/02/2020   • Panic attacks 09/02/2020   • Nicotine dependence-cigars 09/02/2020   • Family history of premature coronary artery disease 09/02/2020     He  has a past surgical history that includes Adenoidectomy  His family history includes Alcohol abuse in his father; Ankylosing spondylitis in his father;  Anxiety disorder in his mother; Bipolar disorder in his father; Cancer in his paternal grandfather; Coronary artery disease in his maternal grandfather and maternal grandmother; Depression in his father, mother, and sister; Diabetes in his maternal grandmother; Drug abuse in his father; Heart attack in his maternal grandfather and maternal grandmother; Hypertension in his maternal grandfather and maternal grandmother; Lung cancer in his paternal grandmother; Substance Abuse in his father and mother  He  reports that he has been smoking cigars  He has never used smokeless tobacco  He reports that he does not currently use alcohol  He reports that he does not currently use drugs after having used the following drugs: Marijuana  Current Outpatient Medications   Medication Sig Dispense Refill   • Multiple Vitamin (multivitamin) tablet Take 1 tablet by mouth daily     • cyclobenzaprine (FLEXERIL) 10 mg tablet Take 1 tablet (10 mg total) by mouth daily as needed for muscle spasms (Patient not taking: Reported on 6/7/2023) 30 tablet 0   • hydrOXYzine HCL (ATARAX) 50 mg tablet Take 0 5 tablets (25 mg total) by mouth 3 (three) times a day as needed for anxiety (Patient not taking: Reported on 6/7/2023) 60 tablet 2   • naproxen (Naprosyn) 500 mg tablet Take 1 tablet (500 mg total) by mouth 2 (two) times a day with meals (Patient not taking: Reported on 6/7/2023) 40 tablet 1     No current facility-administered medications for this visit       Current Outpatient Medications on File Prior to Visit   Medication Sig   • Multiple Vitamin (multivitamin) tablet Take 1 tablet by mouth daily   • cyclobenzaprine (FLEXERIL) 10 mg tablet Take 1 tablet (10 mg total) by mouth daily as needed for muscle spasms (Patient not taking: Reported on 6/7/2023)   • hydrOXYzine HCL (ATARAX) 50 mg tablet Take 0 5 tablets (25 mg total) by mouth 3 (three) times a day as needed for anxiety (Patient not taking: Reported on 6/7/2023)   • naproxen (Naprosyn) 500 mg tablet Take 1 tablet (500 mg total) by mouth 2 (two) times a day with meals (Patient not taking: Reported on 6/7/2023)   • [DISCONTINUED] FLUoxetine (PROzac) 10 MG tablet TAKE 1 TABLET BY MOUTH EVERY DAY (Patient not taking: Reported on 6/7/2023)   • [DISCONTINUED] FLUoxetine (PROzac) 20 MG tablet TAKE 1 TABLET BY "MOUTH EVERY DAY (Patient not taking: Reported on 6/7/2023)     No current facility-administered medications on file prior to visit  He is allergic to codeine and penicillins       Review of Systems   Constitutional: Positive for unexpected weight change  Negative for activity change, chills, fatigue and fever  HENT: Negative for ear pain, postnasal drip, rhinorrhea, sinus pressure and sore throat  Eyes: Negative for pain  Respiratory: Negative for cough, choking, chest tightness, shortness of breath and wheezing  Cardiovascular: Negative for chest pain, palpitations and leg swelling  Gastrointestinal: Negative for abdominal pain, constipation, diarrhea, nausea and vomiting  Genitourinary: Negative for dysuria and hematuria  Musculoskeletal: Positive for back pain  Negative for arthralgias, gait problem, joint swelling, myalgias and neck stiffness  Skin: Negative for pallor and rash  Neurological: Positive for dizziness (occasionally )  Negative for tremors, seizures, syncope, light-headedness and headaches  Hematological: Negative for adenopathy  Psychiatric/Behavioral: Positive for dysphoric mood and sleep disturbance (snores )  Negative for behavioral problems  The patient is nervous/anxious  Objective:      /72 (BP Location: Left arm, Patient Position: Sitting, Cuff Size: Large)   Pulse 62   Temp 98 4 °F (36 9 °C) (Temporal)   Ht 5' 11\" (1 803 m)   SpO2 98%   BMI 45 05 kg/m²          Physical Exam  Constitutional:       General: He is not in acute distress  Appearance: He is well-developed  He is not diaphoretic  HENT:      Head: Normocephalic and atraumatic  Right Ear: External ear normal  Tympanic membrane is erythematous  Left Ear: External ear normal  Tympanic membrane is injected  Nose: Mucosal edema and congestion present  Mouth/Throat:      Mouth: Mucous membranes are dry  Pharynx: Posterior oropharyngeal erythema present   No " oropharyngeal exudate  Eyes:      General: No scleral icterus  Right eye: No discharge  Left eye: No discharge  Conjunctiva/sclera: Conjunctivae normal       Pupils: Pupils are equal, round, and reactive to light  Neck:      Thyroid: No thyromegaly  Vascular: No JVD  Trachea: No tracheal deviation  Cardiovascular:      Rate and Rhythm: Normal rate and regular rhythm  Heart sounds: Heart sounds are distant  No murmur heard  No friction rub  No gallop  Pulmonary:      Effort: Pulmonary effort is normal  No respiratory distress  Breath sounds: Decreased breath sounds present  No wheezing or rales  Chest:      Chest wall: No tenderness  Abdominal:      General: Bowel sounds are normal  There is no distension  Palpations: Abdomen is soft  There is no mass  Tenderness: There is no abdominal tenderness  There is no guarding or rebound  Musculoskeletal:         General: No tenderness or deformity  Normal range of motion  Cervical back: Normal range of motion and neck supple  Lymphadenopathy:      Cervical: No cervical adenopathy  Skin:     General: Skin is warm and dry  Coloration: Skin is not pale  Findings: No erythema or rash  Neurological:      Mental Status: He is alert and oriented to person, place, and time  Cranial Nerves: No cranial nerve deficit  Motor: No abnormal muscle tone  Coordination: Coordination normal       Deep Tendon Reflexes: Reflexes are normal and symmetric        Comments: Cranial nerves 2-12 are intact bilaterally  Muscle strength is 5/5 in all extremities  Sensation is intact in bilateral face and extremities  Rapid alternating movement and finger-to-nose pointing test intact   Deep tendon reflexes are 2+ bilaterally  Gait is intact       Psychiatric:         Behavior: Behavior normal            Admission on 11/14/2022, Discharged on 11/14/2022   Component Date Value Ref Range Status   • WBC 11/14/2022 15 97 (H)  4 31 - 10 16 Thousand/uL Final   • RBC 11/14/2022 5 10  3 88 - 5 62 Million/uL Final   • Hemoglobin 11/14/2022 15 5  12 0 - 17 0 g/dL Final   • Hematocrit 11/14/2022 45 3  36 5 - 49 3 % Final   • MCV 11/14/2022 89  82 - 98 fL Final   • MCH 11/14/2022 30 4  26 8 - 34 3 pg Final   • MCHC 11/14/2022 34 2  31 4 - 37 4 g/dL Final   • RDW 11/14/2022 12 7  11 6 - 15 1 % Final   • MPV 11/14/2022 10 3  8 9 - 12 7 fL Final   • Platelets 70/27/0106 333  149 - 390 Thousands/uL Final   • nRBC 11/14/2022 0  /100 WBCs Final   • Neutrophils Relative 11/14/2022 66  43 - 75 % Final   • Immat GRANS % 11/14/2022 0  0 - 2 % Final   • Lymphocytes Relative 11/14/2022 25  14 - 44 % Final   • Monocytes Relative 11/14/2022 8  4 - 12 % Final   • Eosinophils Relative 11/14/2022 1  0 - 6 % Final   • Basophils Relative 11/14/2022 0  0 - 1 % Final   • Neutrophils Absolute 11/14/2022 10 41 (H)  1 85 - 7 62 Thousands/µL Final   • Immature Grans Absolute 11/14/2022 0 07  0 00 - 0 20 Thousand/uL Final   • Lymphocytes Absolute 11/14/2022 4 00  0 60 - 4 47 Thousands/µL Final   • Monocytes Absolute 11/14/2022 1 30 (H)  0 17 - 1 22 Thousand/µL Final   • Eosinophils Absolute 11/14/2022 0 15  0 00 - 0 61 Thousand/µL Final   • Basophils Absolute 11/14/2022 0 04  0 00 - 0 10 Thousands/µL Final   • Sodium 11/14/2022 138  135 - 147 mmol/L Final   • Potassium 11/14/2022 3 8  3 5 - 5 3 mmol/L Final   • Chloride 11/14/2022 107  96 - 108 mmol/L Final   • CO2 11/14/2022 19 (L)  21 - 32 mmol/L Final   • ANION GAP 11/14/2022 12  4 - 13 mmol/L Final   • BUN 11/14/2022 14  5 - 25 mg/dL Final   • Creatinine 11/14/2022 1 12  0 60 - 1 30 mg/dL Final    Standardized to IDMS reference method   • Glucose 11/14/2022 152 (H)  65 - 140 mg/dL Final    If the patient is fasting, the ADA then defines impaired fasting glucose as > 100 mg/dL and diabetes as > or equal to 123 mg/dL    Specimen collection should occur prior to Sulfasalazine administration due to the potential for falsely depressed results  Specimen collection should occur prior to Sulfapyridine administration due to the potential for falsely elevated results  • Calcium 11/14/2022 9 6  8 3 - 10 1 mg/dL Final   • AST 11/14/2022 34  5 - 45 U/L Final    Specimen collection should occur prior to Sulfasalazine administration due to the potential for falsely depressed results  • ALT 11/14/2022 69  12 - 78 U/L Final    Specimen collection should occur prior to Sulfasalazine and/or Sulfapyridine administration due to the potential for falsely depressed results  • Alkaline Phosphatase 11/14/2022 83  46 - 116 U/L Final   • Total Protein 11/14/2022 8 0  6 4 - 8 4 g/dL Final   • Albumin 11/14/2022 3 6  3 5 - 5 0 g/dL Final   • Total Bilirubin 11/14/2022 0 64  0 20 - 1 00 mg/dL Final    Use of this assay is not recommended for patients undergoing treatment with eltrombopag due to the potential for falsely elevated results     • eGFR 11/14/2022 87  ml/min/1 73sq m Final   • Lipase 11/14/2022 136  73 - 393 u/L Final   • Color, UA 11/14/2022 Gabrielle   Final   • Clarity, UA 11/14/2022 Slightly Cloudy   Final   • pH, UA 11/14/2022 5 5  4 5 - 8 0 Final   • Leukocytes, UA 11/14/2022 Negative  Negative Final   • Nitrite, UA 11/14/2022 Negative  Negative Final   • Protein, UA 11/14/2022 Negative  Negative mg/dl Final   • Glucose, UA 11/14/2022 Negative  Negative mg/dl Final   • Ketones, UA 11/14/2022 Negative  Negative mg/dl Final   • Urobilinogen, UA 11/14/2022 0 2  0 2, 1 0 E U /dl E U /dl Final   • Bilirubin, UA 11/14/2022 Negative  Negative Final   • Occult Blood, UA 11/14/2022 Large (A)  Negative Final   • Specific Gravity, UA 11/14/2022 1 015  1 003 - 1 030 Final   • RBC, UA 11/14/2022 Innumerable (A)  None Seen, 1-2 /hpf Final   • WBC, UA 11/14/2022 10-20 (A)  None Seen, 1-2 /hpf Final   • Epithelial Cells 11/14/2022 Occasional  None Seen, Occasional /hpf Final   • Bacteria, UA 11/14/2022 None Seen  None Seen, Occasional /hpf Final   • MUCUS THREADS 11/14/2022 Occasional (A)  None Seen Final   • Urine Culture 11/14/2022 No Growth <1000 cfu/mL   Final

## 2023-06-07 NOTE — PATIENT INSTRUCTIONS
Wellness Visit for Adults   AMBULATORY CARE:   A wellness visit  is when you see your healthcare provider to get screened for health problems  Your healthcare provider will also give you advice on how to stay healthy  Write down your questions so you remember to ask them  Ask your healthcare provider how often you should have a wellness visit  What happens at a wellness visit:  Your healthcare provider will ask about your health, and your family history of health problems  This includes high blood pressure, heart disease, and cancer  He or she will ask if you have symptoms that concern you, if you smoke, and about your mood  You may also be asked about your intake of medicines, supplements, food, and alcohol  Any of the following may be done: Your weight  will be checked  Your height may also be checked so your body mass index (BMI) can be calculated  Your BMI shows if you are at a healthy weight  Your blood pressure  and heart rate will be checked  Your temperature may also be checked  Blood and urine tests  may be done  Blood tests may be done to check your cholesterol levels  Abnormal cholesterol levels increase your risk for heart disease and stroke  You may also need a blood or urine test to check for diabetes if you are at increased risk  Urine tests may be done to look for signs of an infection or kidney disease  A physical exam  includes checking your heartbeat and lungs with a stethoscope  Your healthcare provider may also check your skin to look for sun damage  Screening tests  may be recommended  A screening test is done to check for diseases that may not cause symptoms  The screening tests you may need depend on your age, gender, family history, and lifestyle habits  For example, colorectal screening may be recommended if you are 48years old or older  Screening tests you need if you are a woman:   A Pap smear  is used to screen for cervical cancer   Pap smears are usually done every 3 to 5 years depending on your age  You may need them more often if you have had abnormal Pap smear test results in the past  Ask your healthcare provider how often you should have a Pap smear  A mammogram  is an x-ray of your breasts to screen for breast cancer  Experts recommend mammograms every 2 years starting at age 48 years  You may need a mammogram at age 52 years or younger if you have an increased risk for breast cancer  Talk to your healthcare provider about when you should start having mammograms and how often you need them  Vaccines you may need:   Get an influenza vaccine  every year  The influenza vaccine protects you from the flu  Several types of viruses cause the flu  The viruses change over time, so new vaccines are made each year  Get a tetanus-diphtheria (Td) booster vaccine  every 10 years  This vaccine protects you against tetanus and diphtheria  Tetanus is a severe infection that may cause painful muscle spasms and lockjaw  Diphtheria is a severe bacterial infection that causes a thick covering in the back of your mouth and throat  Get a human papillomavirus (HPV) vaccine  if you are female and aged 23 to 32 or male 23 to 24 and never received it  This vaccine protects you from HPV infection  HPV is the most common infection spread by sexual contact  HPV may also cause vaginal, penile, and anal cancers  Get a pneumococcal vaccine  if you are aged 72 years or older  The pneumococcal vaccine is an injection given to protect you from pneumococcal disease  Pneumococcal disease is an infection caused by pneumococcal bacteria  The infection may cause pneumonia, meningitis, or an ear infection  Get a shingles vaccine  if you are 60 or older, even if you have had shingles before  The shingles vaccine is an injection to protect you from the varicella-zoster virus  This is the same virus that causes chickenpox   Shingles is a painful rash that develops in people who had chickenpox or have been exposed to the virus  How to eat healthy:  My Plate is a model for planning healthy meals  It shows the types and amounts of foods that should go on your plate  Fruits and vegetables make up about half of your plate, and grains and protein make up the other half  A serving of dairy is included on the side of your plate  The amount of calories and serving sizes you need depends on your age, gender, weight, and height  Examples of healthy foods are listed below:  Eat a variety of vegetables  such as dark green, red, and orange vegetables  You can also include canned vegetables low in sodium (salt) and frozen vegetables without added butter or sauces  Eat a variety of fresh fruits , canned fruit in 100% juice, frozen fruit, and dried fruit  Include whole grains  At least half of the grains you eat should be whole grains  Examples include whole-wheat bread, wheat pasta, brown rice, and whole-grain cereals such as oatmeal     Eat a variety of protein foods such as seafood (fish and shellfish), lean meat, and poultry without skin (turkey and chicken)  Examples of lean meats include pork leg, shoulder, or tenderloin, and beef round, sirloin, tenderloin, and extra lean ground beef  Other protein foods include eggs and egg substitutes, beans, peas, soy products, nuts, and seeds  Choose low-fat dairy products such as skim or 1% milk or low-fat yogurt, cheese, and cottage cheese  Limit unhealthy fats  such as butter, hard margarine, and shortening  Exercise:  Exercise at least 30 minutes per day on most days of the week  Some examples of exercise include walking, biking, dancing, and swimming  You can also fit in more physical activity by taking the stairs instead of the elevator or parking farther away from stores  Include muscle strengthening activities 2 days each week  Regular exercise provides many health benefits   It helps you manage your weight, and decreases your risk for type 2 diabetes, heart disease, stroke, and high blood pressure  Exercise can also help improve your mood  Ask your healthcare provider about the best exercise plan for you  General health and safety guidelines:   Do not smoke  Nicotine and other chemicals in cigarettes and cigars can cause lung damage  Ask your healthcare provider for information if you currently smoke and need help to quit  E-cigarettes or smokeless tobacco still contain nicotine  Talk to your healthcare provider before you use these products  Limit alcohol  A drink of alcohol is 12 ounces of beer, 5 ounces of wine, or 1½ ounces of liquor  Lose weight, if needed  Being overweight increases your risk of certain health conditions  These include heart disease, high blood pressure, type 2 diabetes, and certain types of cancer  Protect your skin  Do not sunbathe or use tanning beds  Use sunscreen with a SPF 15 or higher  Apply sunscreen at least 15 minutes before you go outside  Reapply sunscreen every 2 hours  Wear protective clothing, hats, and sunglasses when you are outside  Drive safely  Always wear your seatbelt  Make sure everyone in your car wears a seatbelt  A seatbelt can save your life if you are in an accident  Do not use your cell phone when you are driving  This could distract you and cause an accident  Pull over if you need to make a call or send a text message  Practice safe sex  Use latex condoms if are sexually active and have more than one partner  Your healthcare provider may recommend screening tests for sexually transmitted infections (STIs)  Wear helmets, lifejackets, and protective gear  Always wear a helmet when you ride a bike or motorcycle, go skiing, or play sports that could cause a head injury  Wear protective equipment when you play sports  Wear a lifejacket when you are on a boat or doing water sports      © Copyright [employer] 2022 Information is for End User's use only and may not be sold, redistributed or otherwise used for commercial purposes  The above information is an  only  It is not intended as medical advice for individual conditions or treatments  Talk to your doctor, nurse or pharmacist before following any medical regimen to see if it is safe and effective for you

## 2023-06-08 ENCOUNTER — TELEPHONE (OUTPATIENT)
Dept: PSYCHIATRY | Facility: CLINIC | Age: 33
End: 2023-06-08

## 2023-06-08 NOTE — TELEPHONE ENCOUNTER
Called pt regarding referral pt has been added to med mgmt wait list   Pt prefers female provider  Writer also informed pt of contacting insurance company in the mean time for other locations in his area that may be able to offer services sooner

## 2023-06-08 NOTE — PROGRESS NOTES
Assessment/Plan:    Generalized anxiety disorder with panic attacks  -Currently worsened but patient stopped all his medications  -We will refer him to psychiatry and psychotherapy as requested  -We will follow-up with the results of his TSH    Depression  -Worsened but patient stopped all his medications believing that he was having adverse reactions to the Prozac  -We will refer him to psychiatry and psychotherapy as requested    Obstructive sleep apnea  -I suspect the patient's fatigue might be related to obstructive sleep apnea but his home sleep study showed mild sleep apnea  -I counseled him that if his symptoms of fatigue and daytime sleepiness continue, he might benefit from an in lab polysomnography    Ankylosing spondylitis  -Stable  -We will continue to monitor patient clinically  -He may use naproxen and cyclobenzaprine as needed  -Continue with exercise    Dyslipidemia  -Stable  -Continue with a heart healthy diet and with exercise  -We will follow-up with the results of his lipid panel       Diagnoses and all orders for this visit:    Annual physical exam    Generalized anxiety disorder  -     Ambulatory Referral to Psychiatry; Future  -     Ambulatory Referral to Psychology; Future    Obstructive sleep apnea    Ankylosing spondylitis, unspecified site of spine ([place] [id number] )    Panic attacks  -     Ambulatory Referral to Psychiatry; Future  -     Ambulatory Referral to Psychology; Future    Other tobacco product nicotine dependence, uncomplicated    Dyslipidemia    Depression, recurrent ([facility] )  -     Ambulatory Referral to Psychiatry; Future  -     Ambulatory Referral to Psychology; Future    Obesity, Class III, BMI 40-49 9 (morbid obesity) ([facility])        BMI Counseling: Body mass index is 45 05 kg/m²  The BMI is above normal  Nutrition recommendations include consuming healthier snacks, limiting drinks that contain sugar, reducing intake of saturated and trans fat and reducing intake of cholesterol   Exercise recommendations include moderate physical activity 150 minutes/week  No pharmacotherapy was ordered  Patient referred to PCP  Rationale for BMI follow-up plan is due to patient being overweight or obese  Subjective:      Patient ID: Jadon Pina is a 29 y o  male  HPI     Today, patient presents for a follow-up visit regarding his generalized anxiety disorder, obstructive sleep apnea, ankylosing spondylitis, dyslipidemia and depression  He states that he has not been taking his medications  Patient states that he was taking Prozac and was seeing a counselor for his anxiety and depression but he believes that the fatigue he has is secondary to the Prozac with associated excessive daytime sleepiness so he stopped the prozac abruptly  He states that he felt briefly worse after he stopped the Prozac but his symptoms have stabilized now  He states that he has not been able to see his Counsellor and has been trying to get a psychiatrist or psychotherapist but has not been able to get in and wants to see if we can help him see a psychiatrist sooner rather than later  Of note, he states that he wants a specialist that we will know the medication to put him on without trial and error of different medications  Of note, he states that he has tried other meds in the past but cannot remember the names of the medications  He has not been able to get records from his previous psychiatrist   He states that his back pain is stable  He admits to occasional dizziness, but denies fever, chills, night sweats, headache, nasal congestion, rhinorrhea, cough, chest pain, shortness of breath, palpitations, nausea, vomiting, abdominal pain, diarrhea, constipation, myalgias, arthralgias          The following portions of the patient's history were reviewed and updated as appropriate:   He  has a past medical history of Acute torn meniscus of knee, unspecified laterality, initial encounter, Anxiety, COVID-19 (05/14/2022), Depression, IBS (irritable bowel syndrome), and Panic attack  He   Patient Active Problem List    Diagnosis Date Noted   • Depression, recurrent ([place] [address] ) 06/01/2022   • Post covid-19 condition, unspecified 06/01/2022   • Shakiness 02/09/2022   • Dizziness, intermittent 02/09/2022   • Obstructive sleep apnea 02/09/2022   • Muscle spasm 05/05/2021   • Cervical radiculopathy 04/07/2021   • Upper back pain, chronic 03/24/2021   • Neck pain 03/24/2021   • Neuropathy 03/24/2021   • Ankylosing spondylitis ([place] [address] ) 09/23/2020   • Dyslipidemia 09/23/2020   • Obesity, Class III, BMI 40-49 9 (morbid obesity) ([place] [address] ) 09/02/2020   • Annual physical exam 09/02/2020   • Family history of ankylosing spondylitis 09/02/2020   • Chronic low back pain with bilateral sciatica 09/02/2020   • Generalized anxiety disorder 09/02/2020   • Panic attacks 09/02/2020   • Nicotine dependence-cigars 09/02/2020   • Family history of premature coronary artery disease 09/02/2020     He  has a past surgical history that includes Adenoidectomy  His family history includes Alcohol abuse in his father; Ankylosing spondylitis in his father; Anxiety disorder in his mother; Bipolar disorder in his father; Cancer in his paternal grandfather; Coronary artery disease in his maternal grandfather and maternal grandmother; Depression in his father, mother, and sister; Diabetes in his maternal grandmother; Drug abuse in his father; Heart attack in his maternal grandfather and maternal grandmother; Hypertension in his maternal grandfather and maternal grandmother; Lung cancer in his paternal grandmother; Substance Abuse in his father and mother  He  reports that he has been smoking cigars  He has never used smokeless tobacco  He reports that he does not currently use alcohol  He reports that he does not currently use drugs after having used the following drugs: Marijuana    Current Outpatient Medications   Medication Sig Dispense Refill   • Multiple Vitamin (multivitamin) tablet Take 1 tablet by mouth daily     • cyclobenzaprine (FLEXERIL) 10 mg tablet Take 1 tablet (10 mg total) by mouth daily as needed for muscle spasms (Patient not taking: Reported on 6/7/2023) 30 tablet 0   • hydrOXYzine HCL (ATARAX) 50 mg tablet Take 0 5 tablets (25 mg total) by mouth 3 (three) times a day as needed for anxiety (Patient not taking: Reported on 6/7/2023) 60 tablet 2   • naproxen (Naprosyn) 500 mg tablet Take 1 tablet (500 mg total) by mouth 2 (two) times a day with meals (Patient not taking: Reported on 6/7/2023) 40 tablet 1     No current facility-administered medications for this visit  Current Outpatient Medications on File Prior to Visit   Medication Sig   • Multiple Vitamin (multivitamin) tablet Take 1 tablet by mouth daily   • cyclobenzaprine (FLEXERIL) 10 mg tablet Take 1 tablet (10 mg total) by mouth daily as needed for muscle spasms (Patient not taking: Reported on 6/7/2023)   • hydrOXYzine HCL (ATARAX) 50 mg tablet Take 0 5 tablets (25 mg total) by mouth 3 (three) times a day as needed for anxiety (Patient not taking: Reported on 6/7/2023)   • naproxen (Naprosyn) 500 mg tablet Take 1 tablet (500 mg total) by mouth 2 (two) times a day with meals (Patient not taking: Reported on 6/7/2023)   • [DISCONTINUED] FLUoxetine (PROzac) 10 MG tablet TAKE 1 TABLET BY MOUTH EVERY DAY (Patient not taking: Reported on 6/7/2023)   • [DISCONTINUED] FLUoxetine (PROzac) 20 MG tablet TAKE 1 TABLET BY MOUTH EVERY DAY (Patient not taking: Reported on 6/7/2023)     No current facility-administered medications on file prior to visit  He is allergic to codeine and penicillins       Review of Systems   Constitutional: Positive for unexpected weight change (Weight gain)  Negative for activity change, chills, fatigue and fever  HENT: Negative for ear pain, postnasal drip, rhinorrhea, sinus pressure and sore throat  Eyes: Negative for pain     Respiratory: Negative for cough, choking, chest "tightness, shortness of breath and wheezing  Cardiovascular: Negative for chest pain, palpitations and leg swelling  Gastrointestinal: Negative for abdominal pain, constipation, diarrhea, nausea and vomiting  Genitourinary: Negative for dysuria and hematuria  Musculoskeletal: Positive for back pain  Negative for arthralgias, gait problem, joint swelling, myalgias and neck stiffness  Skin: Negative for pallor and rash  Neurological: Positive for dizziness (Occasional dizziness)  Negative for tremors, seizures, syncope, light-headedness and headaches  Hematological: Negative for adenopathy  Psychiatric/Behavioral: Positive for dysphoric mood and sleep disturbance  Negative for behavioral problems  The patient is nervous/anxious  Objective:      /72 (BP Location: Left arm, Patient Position: Sitting, Cuff Size: Large)   Pulse 62   Temp 98 4 °F (36 9 °C) (Temporal)   Ht 5' 11\" (1 803 m)   SpO2 98%   BMI 45 05 kg/m²          Physical Exam  Constitutional:       General: He is not in acute distress  Appearance: He is well-developed  He is obese  He is not diaphoretic  Comments: BMI is 45 05   HENT:      Head: Normocephalic and atraumatic  Right Ear: External ear normal       Left Ear: External ear normal       Nose: Nose normal       Mouth/Throat:      Mouth: Mucous membranes are dry  Pharynx: Posterior oropharyngeal erythema (Oropharyngeal erythema with Dry mucous membranes Mallampati class IV oropharynx) present  No oropharyngeal exudate  Eyes:      General: No scleral icterus  Right eye: No discharge  Left eye: No discharge  Conjunctiva/sclera: Conjunctivae normal       Pupils: Pupils are equal, round, and reactive to light  Neck:      Thyroid: No thyromegaly  Vascular: No JVD  Trachea: No tracheal deviation  Cardiovascular:      Rate and Rhythm: Normal rate and regular rhythm  Heart sounds: Normal heart sounds   No murmur " heard      No friction rub  No gallop  Pulmonary:      Effort: Pulmonary effort is normal  No respiratory distress  Breath sounds: Normal breath sounds  No wheezing or rales  Chest:      Chest wall: No tenderness  Abdominal:      General: Bowel sounds are normal  There is no distension  Palpations: Abdomen is soft  There is no mass  Tenderness: There is no abdominal tenderness  There is no guarding or rebound  Musculoskeletal:         General: No tenderness or deformity  Normal range of motion  Cervical back: Normal range of motion and neck supple  Lymphadenopathy:      Cervical: No cervical adenopathy  Skin:     General: Skin is warm and dry  Coloration: Skin is not pale  Findings: No erythema or rash  Neurological:      Mental Status: He is alert and oriented to person, place, and time  Cranial Nerves: No cranial nerve deficit  Motor: No abnormal muscle tone  Coordination: Coordination normal       Deep Tendon Reflexes: Reflexes are normal and symmetric     Psychiatric:         Behavior: Behavior normal            Admission on 11/14/2022, Discharged on 11/14/2022   Component Date Value Ref Range Status   • WBC 11/14/2022 15 97 (H)  4 31 - 10 16 Thousand/uL Final   • RBC 11/14/2022 5 10  3 88 - 5 62 Million/uL Final   • Hemoglobin 11/14/2022 15 5  12 0 - 17 0 g/dL Final   • Hematocrit 11/14/2022 45 3  36 5 - 49 3 % Final   • MCV 11/14/2022 89  82 - 98 fL Final   • MCH 11/14/2022 30 4  26 8 - 34 3 pg Final   • MCHC 11/14/2022 34 2  31 4 - 37 4 g/dL Final   • RDW 11/14/2022 12 7  11 6 - 15 1 % Final   • MPV 11/14/2022 10 3  8 9 - 12 7 fL Final   • Platelets 39/66/2072 333  149 - 390 Thousands/uL Final   • nRBC 11/14/2022 0  /100 WBCs Final   • Neutrophils Relative 11/14/2022 66  43 - 75 % Final   • Immat GRANS % 11/14/2022 0  0 - 2 % Final   • Lymphocytes Relative 11/14/2022 25  14 - 44 % Final   • Monocytes Relative 11/14/2022 8  4 - 12 % Final   • Eosinophils Relative 11/14/2022 1  0 - 6 % Final   • Basophils Relative 11/14/2022 0  0 - 1 % Final   • Neutrophils Absolute 11/14/2022 10 41 (H)  1 85 - 7 62 Thousands/µL Final   • Immature Grans Absolute 11/14/2022 0 07  0 00 - 0 20 Thousand/uL Final   • Lymphocytes Absolute 11/14/2022 4 00  0 60 - 4 47 Thousands/µL Final   • Monocytes Absolute 11/14/2022 1 30 (H)  0 17 - 1 22 Thousand/µL Final   • Eosinophils Absolute 11/14/2022 0 15  0 00 - 0 61 Thousand/µL Final   • Basophils Absolute 11/14/2022 0 04  0 00 - 0 10 Thousands/µL Final   • Sodium 11/14/2022 138  135 - 147 mmol/L Final   • Potassium 11/14/2022 3 8  3 5 - 5 3 mmol/L Final   • Chloride 11/14/2022 107  96 - 108 mmol/L Final   • CO2 11/14/2022 19 (L)  21 - 32 mmol/L Final   • ANION GAP 11/14/2022 12  4 - 13 mmol/L Final   • BUN 11/14/2022 14  5 - 25 mg/dL Final   • Creatinine 11/14/2022 1 12  0 60 - 1 30 mg/dL Final    Standardized to IDMS reference method   • Glucose 11/14/2022 152 (H)  65 - 140 mg/dL Final    If the patient is fasting, the ADA then defines impaired fasting glucose as > 100 mg/dL and diabetes as > or equal to 123 mg/dL  Specimen collection should occur prior to Sulfasalazine administration due to the potential for falsely depressed results  Specimen collection should occur prior to Sulfapyridine administration due to the potential for falsely elevated results  • Calcium 11/14/2022 9 6  8 3 - 10 1 mg/dL Final   • AST 11/14/2022 34  5 - 45 U/L Final    Specimen collection should occur prior to Sulfasalazine administration due to the potential for falsely depressed results  • ALT 11/14/2022 69  12 - 78 U/L Final    Specimen collection should occur prior to Sulfasalazine and/or Sulfapyridine administration due to the potential for falsely depressed results      • Alkaline Phosphatase 11/14/2022 83  46 - 116 U/L Final   • Total Protein 11/14/2022 8 0  6 4 - 8 4 g/dL Final   • Albumin 11/14/2022 3 6  3 5 - 5 0 g/dL Final   • Total Bilirubin 11/14/2022 0 64  0 20 - 1 00 mg/dL Final    Use of this assay is not recommended for patients undergoing treatment with eltrombopag due to the potential for falsely elevated results     • eGFR 11/14/2022 87  ml/min/1 73sq m Final   • Lipase 11/14/2022 136  73 - 393 u/L Final   • Color, UA 11/14/2022 Gabrielle   Final   • Clarity, UA 11/14/2022 Slightly Cloudy   Final   • pH, UA 11/14/2022 5 5  4 5 - 8 0 Final   • Leukocytes, UA 11/14/2022 Negative  Negative Final   • Nitrite, UA 11/14/2022 Negative  Negative Final   • Protein, UA 11/14/2022 Negative  Negative mg/dl Final   • Glucose, UA 11/14/2022 Negative  Negative mg/dl Final   • Ketones, UA 11/14/2022 Negative  Negative mg/dl Final   • Urobilinogen, UA 11/14/2022 0 2  0 2, 1 0 E U /dl E U /dl Final   • Bilirubin, UA 11/14/2022 Negative  Negative Final   • Occult Blood, UA 11/14/2022 Large (A)  Negative Final   • Specific Gravity, UA 11/14/2022 1 015  1 003 - 1 030 Final   • RBC, UA 11/14/2022 Innumerable (A)  None Seen, 1-2 /hpf Final   • WBC, UA 11/14/2022 10-20 (A)  None Seen, 1-2 /hpf Final   • Epithelial Cells 11/14/2022 Occasional  None Seen, Occasional /hpf Final   • Bacteria, UA 11/14/2022 None Seen  None Seen, Occasional /hpf Final   • MUCUS THREADS 11/14/2022 Occasional (A)  None Seen Final   • Urine Culture 11/14/2022 No Growth <1000 cfu/mL   Final

## 2023-08-04 DIAGNOSIS — F41.1 GAD (GENERALIZED ANXIETY DISORDER): Primary | ICD-10-CM

## 2023-08-04 DIAGNOSIS — F32.89 OTHER DEPRESSION: ICD-10-CM

## 2023-08-04 NOTE — TELEPHONE ENCOUNTER
LOV 6/7/23  NOV 12/13/23      Patient states he stopped medications but now is continuing them.     Please advise, thank you

## 2023-08-05 ENCOUNTER — TELEPHONE (OUTPATIENT)
Dept: INTERNAL MEDICINE CLINIC | Age: 33
End: 2023-08-05

## 2023-08-05 RX ORDER — FLUOXETINE HYDROCHLORIDE 20 MG/1
20 CAPSULE ORAL DAILY
Qty: 90 CAPSULE | Refills: 1 | Status: SHIPPED | OUTPATIENT
Start: 2023-08-05

## 2023-08-05 RX ORDER — FLUOXETINE 10 MG/1
10 CAPSULE ORAL DAILY
Qty: 90 CAPSULE | Refills: 1 | Status: SHIPPED | OUTPATIENT
Start: 2023-08-05

## 2024-03-20 DIAGNOSIS — F32.89 OTHER DEPRESSION: ICD-10-CM

## 2024-03-20 DIAGNOSIS — F41.1 GAD (GENERALIZED ANXIETY DISORDER): ICD-10-CM

## 2024-03-20 RX ORDER — FLUOXETINE HYDROCHLORIDE 20 MG/1
20 CAPSULE ORAL DAILY
Qty: 90 CAPSULE | Refills: 0 | Status: SHIPPED | OUTPATIENT
Start: 2024-03-20

## 2024-03-25 ENCOUNTER — OFFICE VISIT (OUTPATIENT)
Dept: INTERNAL MEDICINE CLINIC | Age: 34
End: 2024-03-25
Payer: COMMERCIAL

## 2024-03-25 VITALS
SYSTOLIC BLOOD PRESSURE: 122 MMHG | OXYGEN SATURATION: 97 % | DIASTOLIC BLOOD PRESSURE: 78 MMHG | TEMPERATURE: 97.7 F | HEIGHT: 71 IN | BODY MASS INDEX: 45.05 KG/M2 | HEART RATE: 78 BPM

## 2024-03-25 DIAGNOSIS — E78.5 DYSLIPIDEMIA: ICD-10-CM

## 2024-03-25 DIAGNOSIS — M45.9 ANKYLOSING SPONDYLITIS, UNSPECIFIED SITE OF SPINE (HCC): ICD-10-CM

## 2024-03-25 DIAGNOSIS — G89.29 CHRONIC MIDLINE LOW BACK PAIN WITH BILATERAL SCIATICA: Primary | ICD-10-CM

## 2024-03-25 DIAGNOSIS — M54.42 CHRONIC MIDLINE LOW BACK PAIN WITH BILATERAL SCIATICA: Primary | ICD-10-CM

## 2024-03-25 DIAGNOSIS — F33.9 DEPRESSION, RECURRENT (HCC): ICD-10-CM

## 2024-03-25 DIAGNOSIS — F41.1 GENERALIZED ANXIETY DISORDER: ICD-10-CM

## 2024-03-25 DIAGNOSIS — M54.12 CERVICAL RADICULOPATHY: ICD-10-CM

## 2024-03-25 DIAGNOSIS — M54.41 CHRONIC MIDLINE LOW BACK PAIN WITH BILATERAL SCIATICA: Primary | ICD-10-CM

## 2024-03-25 DIAGNOSIS — F41.0 PANIC ATTACKS: ICD-10-CM

## 2024-03-25 PROCEDURE — 99214 OFFICE O/P EST MOD 30 MIN: CPT | Performed by: INTERNAL MEDICINE

## 2024-03-25 NOTE — PROGRESS NOTES
Assessment/Plan:    Generalized anxiety disorder with panic attacks  -Well-controlled on Prozac  -Continue with 30 mg of Prozac daily and as needed hydroxyzine  -Will order repeat TSH  -Follow-up in 6 months for an annual physical examination    Cervical radiculopathy  -Stable  -Patient may use naproxen as needed    Ankylosing spondylitis with chronic low back pain  -Well-controlled  -Continue with as needed naproxen and Flexeril    Depression  -Well-controlled  -Continue with Prozac at current dose    Dyslipidemia  -Stable  -We will order repeat lipid panel     Diagnoses and all orders for this visit:    Chronic midline low back pain with bilateral sciatica  -     Comprehensive metabolic panel; Future  -     CBC and differential; Future  -     TSH, 3rd generation with Free T4 reflex; Future  -     Lipid panel; Future    Cervical radiculopathy  -     Comprehensive metabolic panel; Future  -     CBC and differential; Future  -     TSH, 3rd generation with Free T4 reflex; Future  -     Lipid panel; Future    Ankylosing spondylitis, unspecified site of spine (HCC)  -     Comprehensive metabolic panel; Future  -     CBC and differential; Future  -     TSH, 3rd generation with Free T4 reflex; Future  -     Lipid panel; Future    Generalized anxiety disorder  -     Comprehensive metabolic panel; Future  -     CBC and differential; Future  -     TSH, 3rd generation with Free T4 reflex; Future  -     Lipid panel; Future    Depression, recurrent (HCC)  -     Comprehensive metabolic panel; Future  -     CBC and differential; Future  -     TSH, 3rd generation with Free T4 reflex; Future  -     Lipid panel; Future    Dyslipidemia  -     Comprehensive metabolic panel; Future  -     CBC and differential; Future  -     TSH, 3rd generation with Free T4 reflex; Future  -     Lipid panel; Future    Panic attacks          Subjective:      Patient ID: Zach Mckeon IV is a 33 y.o. male.    HPI    Patient presents for a follow-up visit  regarding his generalized anxiety disorder, with panic attacks, dyslipidemia, ankylosing spondylitis with back pain and neck pain.  He states that he  has been taking some of his medications as prescribed.  Today pt states that he has been doing well on the prozac.  He feels like he gained weight initially but has not continued to gain weight.  No on going wt gain or sexual side effects   Back pain is well controlled   No taking the flexeril   No need for refills today.  He admits to nasal congestion and rhinorrhea secondary to seasonal allergies which tend to act up when the weather changes.  He normally takes over-the-counter Allegra but does not know if it actually helps.  He denies any fever, chills, night sweats, headache, dizziness,  pnd, sore throat, ear ache, sinus pain or pressure, wheezing, cough, chest pain, sob, palpitations, nausea, vomiting, diarrhea, constipation, hematochezia, hematuria, melena stools, arthralgias, myalgias, feelings of  depression or insomnia.  .    The following portions of the patient's history were reviewed and updated as appropriate: He  has a past medical history of Acute torn meniscus of knee, unspecified laterality, initial encounter, Anxiety, COVID-19 (05/14/2022), Depression, IBS (irritable bowel syndrome), and Panic attack.  He   Patient Active Problem List    Diagnosis Date Noted   • Depression, recurrent ([facility]) 06/01/2022   • Post covid-19 condition, unspecified 06/01/2022   • Shakiness 02/09/2022   • Dizziness, intermittent 02/09/2022   • Obstructive sleep apnea 02/09/2022   • Muscle spasm 05/05/2021   • Cervical radiculopathy 04/07/2021   • Upper back pain, chronic 03/24/2021   • Neck pain 03/24/2021   • Neuropathy 03/24/2021   • Ankylosing spondylitis ([facility]) 09/23/2020   • Dyslipidemia 09/23/2020   • Obesity, Class III, BMI 40-49.9 (morbid obesity) ([facility]) 09/02/2020   • Annual physical exam 09/02/2020   • Family history of ankylosing spondylitis 09/02/2020   • Chronic  low back pain with bilateral sciatica 09/02/2020   • Generalized anxiety disorder 09/02/2020   • Panic attacks 09/02/2020   • Nicotine dependence-cigars 09/02/2020   • Family history of premature coronary artery disease 09/02/2020     He  has a past surgical history that includes Adenoidectomy.  His family history includes Alcohol abuse in his father; Ankylosing spondylitis in his father; Anxiety disorder in his mother; Bipolar disorder in his father; Cancer in his paternal grandfather; Coronary artery disease in his maternal grandfather and maternal grandmother; Depression in his father, mother, and sister; Diabetes in his maternal grandmother; Drug abuse in his father; Heart attack in his maternal grandfather and maternal grandmother; Hypertension in his maternal grandfather and maternal grandmother; Lung cancer in his paternal grandmother; Substance Abuse in his father and mother.  He  reports that he has been smoking cigars. He has never used smokeless tobacco. He reports that he does not currently use alcohol. He reports that he does not currently use drugs after having used the following drugs: Marijuana.  Current Outpatient Medications   Medication Sig Dispense Refill   • FLUoxetine (PROzac) 10 mg capsule Take 1 capsule (10 mg total) by mouth daily 90 capsule 1   • FLUoxetine (PROzac) 20 mg capsule Take 1 capsule (20 mg total) by mouth daily 90 capsule 0   • Multiple Vitamin (multivitamin) tablet Take 1 tablet by mouth daily     • cyclobenzaprine (FLEXERIL) 10 mg tablet Take 1 tablet (10 mg total) by mouth daily as needed for muscle spasms (Patient not taking: Reported on 6/7/2023) 30 tablet 0   • hydrOXYzine HCL (ATARAX) 50 mg tablet Take 0.5 tablets (25 mg total) by mouth 3 (three) times a day as needed for anxiety (Patient not taking: Reported on 6/7/2023) 60 tablet 2     No current facility-administered medications for this visit.     Current Outpatient Medications on File Prior to Visit   Medication Sig    • FLUoxetine (PROzac) 10 mg capsule Take 1 capsule (10 mg total) by mouth daily   • FLUoxetine (PROzac) 20 mg capsule Take 1 capsule (20 mg total) by mouth daily   • Multiple Vitamin (multivitamin) tablet Take 1 tablet by mouth daily   • cyclobenzaprine (FLEXERIL) 10 mg tablet Take 1 tablet (10 mg total) by mouth daily as needed for muscle spasms (Patient not taking: Reported on 6/7/2023)   • hydrOXYzine HCL (ATARAX) 50 mg tablet Take 0.5 tablets (25 mg total) by mouth 3 (three) times a day as needed for anxiety (Patient not taking: Reported on 6/7/2023)     No current facility-administered medications on file prior to visit.     He is allergic to codeine and penicillins..    Review of Systems   Constitutional:  Negative for activity change, chills, fatigue, fever and unexpected weight change.   HENT:  Positive for congestion and rhinorrhea (takes allegra for his allergies - worse with changes in weather). Negative for ear pain, postnasal drip, sinus pressure and sore throat.    Eyes:  Negative for pain.   Respiratory:  Negative for cough, choking, chest tightness, shortness of breath and wheezing.    Cardiovascular:  Negative for chest pain, palpitations and leg swelling.   Gastrointestinal:  Negative for abdominal pain, constipation, diarrhea, nausea and vomiting.   Genitourinary:  Negative for dysuria and hematuria.   Musculoskeletal:  Positive for back pain (Well-controlled). Negative for arthralgias, gait problem, joint swelling, myalgias and neck stiffness.   Skin:  Negative for pallor and rash.   Neurological:  Negative for dizziness, tremors, seizures, syncope, light-headedness and headaches.   Hematological:  Negative for adenopathy.   Psychiatric/Behavioral:  Positive for dysphoric mood (Well-controlled). Negative for behavioral problems. The patient is nervous/anxious (well controlled).          Objective:      /78 (BP Location: Left arm, Patient Position: Sitting, Cuff Size: Standard)   Pulse 78  "  Temp 97.7 °F (36.5 °C) (Temporal)   Ht 5' 11\" (1.803 m)   SpO2 97%   BMI 45.05 kg/m²          Physical Exam  Constitutional:       General: He is not in acute distress.     Appearance: He is well-developed. He is obese. He is not diaphoretic.   HENT:      Head: Normocephalic and atraumatic.      Right Ear: External ear normal.      Left Ear: External ear normal.      Nose: Nose normal.      Mouth/Throat:      Mouth: Mucous membranes are dry.      Pharynx: No oropharyngeal exudate.      Comments: Dry mucous memb with Mallampati class 4 oropharynx  Eyes:      General: No scleral icterus.        Right eye: No discharge.         Left eye: No discharge.      Conjunctiva/sclera: Conjunctivae normal.      Pupils: Pupils are equal, round, and reactive to light.   Neck:      Thyroid: No thyromegaly.      Vascular: No JVD.      Trachea: No tracheal deviation.   Cardiovascular:      Rate and Rhythm: Normal rate and regular rhythm.      Heart sounds: Normal heart sounds. No murmur heard.     No friction rub. No gallop.   Pulmonary:      Effort: Pulmonary effort is normal. No respiratory distress.      Breath sounds: Decreased breath sounds present. No wheezing or rales.   Chest:      Chest wall: No tenderness.   Abdominal:      General: Bowel sounds are normal. There is no distension.      Palpations: Abdomen is soft. There is no mass.      Tenderness: There is no abdominal tenderness. There is no guarding or rebound.   Musculoskeletal:         General: No tenderness or deformity. Normal range of motion.      Cervical back: Normal range of motion and neck supple.   Lymphadenopathy:      Cervical: No cervical adenopathy.   Skin:     General: Skin is warm and dry.      Coloration: Skin is not pale.      Findings: No erythema or rash.   Neurological:      Mental Status: He is alert and oriented to person, place, and time.      Cranial Nerves: No cranial nerve deficit.      Motor: No abnormal muscle tone.      Coordination: " Coordination normal.      Deep Tendon Reflexes: Reflexes are normal and symmetric.   Psychiatric:         Behavior: Behavior normal.           No visits with results within 12 Month(s) from this visit.   Latest known visit with results is:   Admission on 11/14/2022, Discharged on 11/14/2022   Component Date Value Ref Range Status   • WBC 11/14/2022 15.97 (H)  4.31 - 10.16 Thousand/uL Final   • RBC 11/14/2022 5.10  3.88 - 5.62 Million/uL Final   • Hemoglobin 11/14/2022 15.5  12.0 - 17.0 g/dL Final   • Hematocrit 11/14/2022 45.3  36.5 - 49.3 % Final   • MCV 11/14/2022 89  82 - 98 fL Final   • MCH 11/14/2022 30.4  26.8 - 34.3 pg Final   • MCHC 11/14/2022 34.2  31.4 - 37.4 g/dL Final   • RDW 11/14/2022 12.7  11.6 - 15.1 % Final   • MPV 11/14/2022 10.3  8.9 - 12.7 fL Final   • Platelets 11/14/2022 333  149 - 390 Thousands/uL Final   • nRBC 11/14/2022 0  /100 WBCs Final   • Neutrophils Relative 11/14/2022 66  43 - 75 % Final   • Immature Grans % 11/14/2022 0  0 - 2 % Final   • Lymphocytes Relative 11/14/2022 25  14 - 44 % Final   • Monocytes Relative 11/14/2022 8  4 - 12 % Final   • Eosinophils Relative 11/14/2022 1  0 - 6 % Final   • Basophils Relative 11/14/2022 0  0 - 1 % Final   • Neutrophils Absolute 11/14/2022 10.41 (H)  1.85 - 7.62 Thousands/µL Final   • Absolute Immature Grans 11/14/2022 0.07  0.00 - 0.20 Thousand/uL Final   • Absolute Lymphocytes 11/14/2022 4.00  0.60 - 4.47 Thousands/µL Final   • Absolute Monocytes 11/14/2022 1.30 (H)  0.17 - 1.22 Thousand/µL Final   • Eosinophils Absolute 11/14/2022 0.15  0.00 - 0.61 Thousand/µL Final   • Basophils Absolute 11/14/2022 0.04  0.00 - 0.10 Thousands/µL Final   • Sodium 11/14/2022 138  135 - 147 mmol/L Final   • Potassium 11/14/2022 3.8  3.5 - 5.3 mmol/L Final   • Chloride 11/14/2022 107  96 - 108 mmol/L Final   • CO2 11/14/2022 19 (L)  21 - 32 mmol/L Final   • ANION GAP 11/14/2022 12  4 - 13 mmol/L Final   • BUN 11/14/2022 14  5 - 25 mg/dL Final   • Creatinine  11/14/2022 1.12  0.60 - 1.30 mg/dL Final    Standardized to IDMS reference method   • Glucose 11/14/2022 152 (H)  65 - 140 mg/dL Final    If the patient is fasting, the ADA then defines impaired fasting glucose as > 100 mg/dL and diabetes as > or equal to 123 mg/dL.  Specimen collection should occur prior to Sulfasalazine administration due to the potential for falsely depressed results. Specimen collection should occur prior to Sulfapyridine administration due to the potential for falsely elevated results.   • Calcium 11/14/2022 9.6  8.3 - 10.1 mg/dL Final   • AST 11/14/2022 34  5 - 45 U/L Final    Specimen collection should occur prior to Sulfasalazine administration due to the potential for falsely depressed results.    • ALT 11/14/2022 69  12 - 78 U/L Final    Specimen collection should occur prior to Sulfasalazine and/or Sulfapyridine administration due to the potential for falsely depressed results.    • Alkaline Phosphatase 11/14/2022 83  46 - 116 U/L Final   • Total Protein 11/14/2022 8.0  6.4 - 8.4 g/dL Final   • Albumin 11/14/2022 3.6  3.5 - 5.0 g/dL Final   • Total Bilirubin 11/14/2022 0.64  0.20 - 1.00 mg/dL Final    Use of this assay is not recommended for patients undergoing treatment with eltrombopag due to the potential for falsely elevated results.   • eGFR 11/14/2022 87  ml/min/1.73sq m Final   • Lipase 11/14/2022 136  73 - 393 u/L Final   • Color, UA 11/14/2022 Gabrielle   Final   • Clarity, UA 11/14/2022 Slightly Cloudy   Final   • pH, UA 11/14/2022 5.5  4.5 - 8.0 Final   • Leukocytes, UA 11/14/2022 Negative  Negative Final   • Nitrite, UA 11/14/2022 Negative  Negative Final   • Protein, UA 11/14/2022 Negative  Negative mg/dl Final   • Glucose, UA 11/14/2022 Negative  Negative mg/dl Final   • Ketones, UA 11/14/2022 Negative  Negative mg/dl Final   • Urobilinogen, UA 11/14/2022 0.2  0.2, 1.0 E.U./dl E.U./dl Final   • Bilirubin, UA 11/14/2022 Negative  Negative Final   • Occult Blood, UA 11/14/2022  Large (A)  Negative Final   • Specific Gravity, UA 11/14/2022 1.015  1.003 - 1.030 Final   • RBC, UA 11/14/2022 Innumerable (A)  None Seen, 1-2 /hpf Final   • WBC, UA 11/14/2022 10-20 (A)  None Seen, 1-2 /hpf Final   • Epithelial Cells 11/14/2022 Occasional  None Seen, Occasional /hpf Final   • Bacteria, UA 11/14/2022 None Seen  None Seen, Occasional /hpf Final   • MUCUS THREADS 11/14/2022 Occasional (A)  None Seen Final   • Urine Culture 11/14/2022 No Growth <1000 cfu/mL   Final

## 2024-05-02 DIAGNOSIS — F41.1 GAD (GENERALIZED ANXIETY DISORDER): ICD-10-CM

## 2024-05-02 DIAGNOSIS — F32.89 OTHER DEPRESSION: ICD-10-CM

## 2024-05-02 RX ORDER — FLUOXETINE 10 MG/1
10 CAPSULE ORAL DAILY
Qty: 90 CAPSULE | Refills: 1 | Status: SHIPPED | OUTPATIENT
Start: 2024-05-02

## 2024-05-02 NOTE — TELEPHONE ENCOUNTER
Reason for call:   [x] Refill   [] Prior Auth  [] Other:     Office:   [x] PCP/Provider - [facility] [place]  [] Specialty/Provider -     Medication: fluoxetine    Dose/Frequency: 10mg qd    Quantity: 90    Pharmacy: [facility]/pharmacy #[id number] - [place], [place] - [address] [place] [address]     Does the patient have enough for 3 days?   [] Yes   [x] No - Send as HP to POD

## 2024-06-20 DIAGNOSIS — F32.89 OTHER DEPRESSION: ICD-10-CM

## 2024-06-20 DIAGNOSIS — F41.1 GAD (GENERALIZED ANXIETY DISORDER): ICD-10-CM

## 2024-06-20 RX ORDER — FLUOXETINE HYDROCHLORIDE 20 MG/1
20 CAPSULE ORAL DAILY
Qty: 90 CAPSULE | Refills: 1 | Status: SHIPPED | OUTPATIENT
Start: 2024-06-20

## 2024-07-26 ENCOUNTER — TELEPHONE (OUTPATIENT)
Age: 34
End: 2024-07-26

## 2024-07-26 NOTE — TELEPHONE ENCOUNTER
Contacted patient off of Medication Management  to verify needs of services in attempts to offer patient an appointment. LVM for patient to contact intake dept  in regards to attempt to schedule.     1st attempt.

## 2024-08-09 ENCOUNTER — TELEPHONE (OUTPATIENT)
Dept: PSYCHIATRY | Facility: CLINIC | Age: 34
End: 2024-08-09

## 2024-08-09 NOTE — TELEPHONE ENCOUNTER
One week follow up call for New Patient appointment with Angela Baker on 9/10/24  was made on 8/9/24. Writer informed patient of New Patient paperwork needing to be completed 5 days prior to the appointment. Writer confirmed paperwork has been sent via [employer].    Appointment was made on: 8/2/24

## 2024-09-09 ENCOUNTER — TELEPHONE (OUTPATIENT)
Dept: OTHER | Facility: OTHER | Age: 34
End: 2024-09-09

## 2024-09-09 ENCOUNTER — TELEPHONE (OUTPATIENT)
Dept: PSYCHIATRY | Facility: CLINIC | Age: 34
End: 2024-09-09

## 2024-09-09 NOTE — TELEPHONE ENCOUNTER
Called and left message for client.  Need Virtual Care Behavioral Health Consent completed (with emergency contact other than client) and signed prior to virtual appt. on 9/10 at 3:00 pm with Dr. Angela Baker.

## 2024-09-10 ENCOUNTER — TELEMEDICINE (OUTPATIENT)
Dept: PSYCHIATRY | Facility: CLINIC | Age: 34
End: 2024-09-10
Payer: COMMERCIAL

## 2024-09-10 DIAGNOSIS — F90.0 ATTENTION DEFICIT HYPERACTIVITY DISORDER, INATTENTIVE TYPE: Primary | ICD-10-CM

## 2024-09-10 PROCEDURE — 90792 PSYCH DIAG EVAL W/MED SRVCS: CPT | Performed by: PSYCHIATRY & NEUROLOGY
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
## 2024-09-11 ENCOUNTER — TELEPHONE (OUTPATIENT)
Dept: PSYCHIATRY | Facility: CLINIC | Age: 34
End: 2024-09-11

## 2024-09-11 NOTE — TELEPHONE ENCOUNTER
Called and left message for client.  Follow-up medication check appt. already scheduled with Dr. Angela Baker on 10/8 at 10:30 am.     Requested he call to reschedule if follow-up appt. in November is preferred.

## 2024-12-24 ENCOUNTER — TELEPHONE (OUTPATIENT)
Dept: INTERNAL MEDICINE CLINIC | Age: 34
End: 2024-12-24

## 2024-12-24 DIAGNOSIS — F41.1 GENERALIZED ANXIETY DISORDER: ICD-10-CM

## 2024-12-24 RX ORDER — HYDROXYZINE HYDROCHLORIDE 50 MG/1
25 TABLET, FILM COATED ORAL 3 TIMES DAILY PRN
Qty: 60 TABLET | Refills: 2 | Status: SHIPPED | OUTPATIENT
Start: 2024-12-24

## 2024-12-24 NOTE — TELEPHONE ENCOUNTER
Patient called to request a refill for their     hydrOXYzine HCL (ATARAX) 50 mg tablet      Patient had stopped taking this but now feels like he really needs to start this medication again    Please send to [phone]

## 2025-02-05 ENCOUNTER — TELEPHONE (OUTPATIENT)
Dept: PSYCHIATRY | Facility: CLINIC | Age: 35
End: 2025-02-05

## 2025-02-05 NOTE — TELEPHONE ENCOUNTER
Writer called and left voicemail which rescheduled client's appointment from today to 3/5/25 at 9:30 in order to wait for test results to come in. Writer asked client to call back if time/date does not work.

## 2025-03-05 ENCOUNTER — TELEPHONE (OUTPATIENT)
Dept: PSYCHIATRY | Facility: CLINIC | Age: 35
End: 2025-03-05

## 2025-03-05 ENCOUNTER — TELEMEDICINE (OUTPATIENT)
Dept: PSYCHIATRY | Facility: CLINIC | Age: 35
End: 2025-03-05
Payer: COMMERCIAL

## 2025-03-05 DIAGNOSIS — F41.1 GENERALIZED ANXIETY DISORDER: ICD-10-CM

## 2025-03-05 DIAGNOSIS — F43.21 ADJUSTMENT DISORDER WITH DEPRESSED MOOD: Primary | ICD-10-CM

## 2025-03-05 PROCEDURE — 99214 OFFICE O/P EST MOD 30 MIN: CPT | Performed by: PSYCHIATRY & NEUROLOGY

## 2025-03-05 RX ORDER — BUPROPION HYDROCHLORIDE 150 MG/1
150 TABLET ORAL DAILY
Qty: 30 TABLET | Refills: 1 | Status: SHIPPED | OUTPATIENT
Start: 2025-03-05

## 2025-03-05 NOTE — PSYCH
Virtual Regular Visit    Verification of patient location:    Patient is located at Home in the following state in which I hold an active license [place]      Assessment/Plan:  Assessment & Plan  Adjustment disorder with depressed mood    Orders:    buPROPion (Wellbutrin XL) 150 mg 24 hr tablet; Take 1 tablet (150 mg total) by mouth daily    Generalized anxiety disorder  Continue therapy          Problem List Items Addressed This Visit       Generalized anxiety disorder     Other Visit Diagnoses         Adjustment disorder with depressed mood    -  Primary            Goals addressed in session: Goal 1     Depression Follow-up Plan Completed: Not applicable    Reason for visit is   Chief Complaint   Patient presents with    Medication Management        Encounter provider Angela Baker MD      Recent Visits  No visits were found meeting these conditions.  Showing recent visits within past 7 days and meeting all other requirements  Today's Visits  Date Type Provider Dept   03/05/25 Telemedicine Angela Baker MD [facility]   Showing today's visits and meeting all other requirements  Future Appointments  No visits were found meeting these conditions.  Showing future appointments within next 150 days and meeting all other requirements       The patient was identified by name and date of birth. Zach Mckeon IV was informed that this is a telemedicine visit and that the visit is being conducted throughthe Epic Embedded platform. He agrees to proceed..  My office door was closed. No one else was in the room.  He acknowledged consent and understanding of privacy and security of the video platform. The patient has agreed to participate and understands they can discontinue the visit at any time.    Patient is aware this is a billable service.     Subjective  Zach Mckeon IV is a 34 y.o. male presents for appt .  He completed neuropsychological testing; I received and reviewed the results. He was diagnosed with  "adjustment do NOS; ADHD r/o   Pt denies acute medical problems  Busy with work ( owns business)  Denies substance use/ abuse  Drinks 1 cup of coffee daily  He started therapy 2-3 weeks ago  Trial of prozac, SE sedation; melatonin - \" feeling weird\"  HPI   Pt presents with spx of ADHD - poor focus, racing thoughts \" all the time\"; rumination and obsessive thoughts sometimes. Difficulty to fall asleep sometimes. He gets distracted during the day, sometimes frustrated; less productive  Anxiety - panic attacks 1 x month - shaking, crying, tachycardia, SOB, fear to die; feeling \" drained \" after  Racing thoughts daily  Mood - mild mood swings - \" 1 day feeling good, the next day might be upset\". Pt does ADLs, stays social and active  Sleep - sometimes difficult to fall asleep; interrupted  Past Medical History:   Diagnosis Date    Acute torn meniscus of knee, unspecified laterality, initial encounter     Anxiety     COVID-19 05/14/2022    tested positive on at home test    Depression     IBS (irritable bowel syndrome)     Panic attack        Past Surgical History:   Procedure Laterality Date    ADENOIDECTOMY         Current Outpatient Medications   Medication Sig Dispense Refill    cyclobenzaprine (FLEXERIL) 10 mg tablet Take 1 tablet (10 mg total) by mouth daily as needed for muscle spasms (Patient not taking: Reported on 6/7/2023) 30 tablet 0    hydrOXYzine HCL (ATARAX) 50 mg tablet Take 0.5 tablets (25 mg total) by mouth 3 (three) times a day as needed for anxiety 60 tablet 2    Multiple Vitamin (multivitamin) tablet Take 1 tablet by mouth daily       No current facility-administered medications for this visit.        Allergies   Allergen Reactions    Codeine Other (See Comments)     Unknown    Penicillins Other (See Comments)     Unknown       Review of Systems   Constitutional:  Negative for activity change and appetite change.   Psychiatric/Behavioral:  Positive for decreased concentration and sleep disturbance. " Negative for suicidal ideas. The patient is nervous/anxious.        Video Exam    There were no vitals filed for this visit.    Physical Exam  Constitutional:       Appearance: Normal appearance. He is normal weight.   Neurological:      Mental Status: He is alert.   Psychiatric:         Attention and Perception: Perception normal. He is inattentive.         Mood and Affect: Affect normal. Mood is anxious. Mood is not depressed.         Speech: Speech normal.         Behavior: Behavior normal. Behavior is cooperative.         Judgment: Judgment normal.          Visit Time  Face to face  Visit Start Time: 9.30 am   Visit Stop Time: 9.48 am   Total Visit Duration:  30 minutes total spent in patient care

## 2025-03-05 NOTE — BH TREATMENT PLAN
TREATMENT PLAN (Medication Management Only)        [facility]    Name and Date of Birth:  Zach Mckeon IV 34 y.o. 1990  MRN: [id number]  Date of Treatment Plan: March 5, 2025  Diagnosis/Diagnoses:    1. Adjustment disorder with depressed mood    2. Generalized anxiety disorder      Strengths/Personal Resources for Self-Care: independence, motivation for treatment.  Area/Areas of need (in own words): anxiety, ADHD symptoms  1. Long Term Goal:   improve ADHD symptoms.  Target Date:6 months - 9/5/2025  Person/Persons responsible for completion of goal: Zach  2.  Short Term Objective (s) - How will we reach this goal?:   A.  Provider new recommended medication/dosage changes and/or continue medication(s): continue current medications as prescribed Wellbutrin XL.  B.  N/A.  C.  N/A.  Target Date:6 months - 9/5/2025  Person/Persons Responsible for Completion of Goal: Zach  Progress Towards Goals: continuing treatment  Treatment Modality: medication management every 4 weeks  Review due 180 days from date of this plan: 6 months - 9/5/2025  Expected length of service: ongoing treatment unless revised  My Physician/PA/NP and I have developed this plan together and I agree to work on the goals and objectives. I understand the treatment goals that were developed for my treatment.   Electronic Signatures: on file (unless signed below)    Angela Baker MD 03/05/25

## 2025-03-21 ENCOUNTER — TELEPHONE (OUTPATIENT)
Dept: PSYCHIATRY | Facility: CLINIC | Age: 35
End: 2025-03-21

## 2025-03-21 NOTE — TELEPHONE ENCOUNTER
Writer called and left voicemail reminding client to schedule his follow up with Dr. Baker expected around the first week of April.

## 2025-05-14 ENCOUNTER — OFFICE VISIT (OUTPATIENT)
Dept: INTERNAL MEDICINE CLINIC | Age: 35
End: 2025-05-14
Payer: COMMERCIAL

## 2025-05-14 VITALS
OXYGEN SATURATION: 97 % | DIASTOLIC BLOOD PRESSURE: 62 MMHG | HEART RATE: 74 BPM | HEIGHT: 71 IN | BODY MASS INDEX: 40.6 KG/M2 | TEMPERATURE: 98.4 F | WEIGHT: 290 LBS | SYSTOLIC BLOOD PRESSURE: 120 MMHG

## 2025-05-14 DIAGNOSIS — E66.01 MORBID OBESITY WITH BMI OF 40.0-44.9, ADULT (HCC): ICD-10-CM

## 2025-05-14 DIAGNOSIS — F41.1 GENERALIZED ANXIETY DISORDER: Primary | ICD-10-CM

## 2025-05-14 DIAGNOSIS — F17.290 OTHER TOBACCO PRODUCT NICOTINE DEPENDENCE, UNCOMPLICATED: ICD-10-CM

## 2025-05-14 DIAGNOSIS — M54.41 CHRONIC MIDLINE LOW BACK PAIN WITH BILATERAL SCIATICA: ICD-10-CM

## 2025-05-14 DIAGNOSIS — E78.5 DYSLIPIDEMIA: ICD-10-CM

## 2025-05-14 DIAGNOSIS — M54.42 CHRONIC MIDLINE LOW BACK PAIN WITH BILATERAL SCIATICA: ICD-10-CM

## 2025-05-14 DIAGNOSIS — Z81.3 FAMILY HISTORY OF DRUG ABUSE: ICD-10-CM

## 2025-05-14 DIAGNOSIS — G89.29 CHRONIC MIDLINE LOW BACK PAIN WITH BILATERAL SCIATICA: ICD-10-CM

## 2025-05-14 DIAGNOSIS — F33.9 DEPRESSION, RECURRENT (HCC): ICD-10-CM

## 2025-05-14 DIAGNOSIS — M45.9 ANKYLOSING SPONDYLITIS, UNSPECIFIED SITE OF SPINE (HCC): ICD-10-CM

## 2025-05-14 DIAGNOSIS — R73.9 HYPERGLYCEMIA: ICD-10-CM

## 2025-05-14 PROCEDURE — 99214 OFFICE O/P EST MOD 30 MIN: CPT | Performed by: INTERNAL MEDICINE

## 2025-05-14 NOTE — PROGRESS NOTES
Name: Zach Mckeon IV      : 1990      MRN: [id number]  Encounter Provider: Carrie Ford DO  Encounter Date: 2025   Encounter department: [facility]  :  Assessment & Plan  Generalized anxiety disorder  -With occasional panic attacks  - Patient declines a daily medication but would like a prescription for clonazepam  - I explained to the patient that because of his family history of drug abuse, it would be very unwise to place him on a benzodiazepine because this could trigger addiction and him.       Hyperglycemia  -Stable  - Will order CMP as well as hemoglobin A1c and follow-up with the results  Orders:    CBC and differential; Future    TSH, 3rd generation with Free T4 reflex; Future    Comprehensive metabolic panel; Future    Lipid panel; Future    Hemoglobin A1C; Future    Depression, recurrent (HCC)  -Patient declines a daily medication  - Will order a TSH and follow-up with the results  - Continue to follow with psychotherapy and psychiatry  - Follow-up in about 1 to 4 weeks for an annual physical exam    Orders:    CBC and differential; Future    TSH, 3rd generation with Free T4 reflex; Future    Comprehensive metabolic panel; Future    Lipid panel; Future    Other tobacco product nicotine dependence, uncomplicated  -Patient smokes occasional cigars  - Cessation counseling was given  - Spent about 3 minutes on smoking cessation counseling  Orders:    CBC and differential; Future    TSH, 3rd generation with Free T4 reflex; Future    Comprehensive metabolic panel; Future    Lipid panel; Future    Chronic midline low back pain with bilateral sciatica  -Much improved with weight loss  - Will continue to monitor him clinically  - Continue with diet and exercise  Orders:    CBC and differential; Future    TSH, 3rd generation with Free T4 reflex; Future    Comprehensive metabolic panel; Future    Lipid panel; Future    Dyslipidemia  -Not optimally controlled  but without a recent lipid panel.  - We will recheck a lipid panel and patient was counseled to continue with diet and exercise  Orders:    CBC and differential; Future    TSH, 3rd generation with Free T4 reflex; Future    Comprehensive metabolic panel; Future    Lipid panel; Future    Ankylosing spondylitis, unspecified site of spine (HCC)  -Very well-controlled  - Will continue to monitor patient clinically       Family history of drug abuse  -This puts patient at risk for drug dependence as well so we will try and avoid controlled substances.       Morbid obesity with BMI of 40.0-44.9, adult (HCC)    -Improving, patient reports that he has lost over 50 pounds in 7 months  - He was counseled to monitor himself for neck swelling, change in speech, neck pain, difficulty swallowing as well as abdominal pain and to report if he notices any such symptoms.  - He was also counseled to check for family history of medullary thyroid cancer or multiple endocrine neoplasia type II as this would predispose him to those cancers as well if he takes semaglutide.             Depression Screening and Follow-up Plan: Patient was screened for depression during today's encounter. They screened negative with a PHQ-9 score of 3.        History of Present Illness     HPI  Patient presents for a follow-up visit regarding his anxiety, depression, chronic low back pain with ankylosing spondylitis.  He states that has been taking his medications as prescribed.  Today states that he has lost 56 lbs in 7 months using semaglutide that he got from an online source.  He states that he currently eats 1500 morena a day and is quite active, exercising about 5 days a week and running for up to 5 miles sometimes.  He admits that he feels shaky sometimes.  He states that he would like to have blood work done.  ? Grandparents had dm   Back pain is 100% better   Quit his job and his back pain is much better  Works for himself now   Tried welbutrin for a few  weeks and it made him anxious and gave him stomach problems and he stopped it.  He states that he currently sees a psychiatrist and also sees his therapist weekly.  He states that his therapist told him that he should ask his PCP for a prescription of clonazepam.  Occasionally has a panic attack about a couple of times a month and he states that when he does have a panic attack it is quite severe.  Feels like he has both anxiety and depression cos of things he has had in his life   Has no interest in taking a daily medication for anxiety or depression.  Eating [id number] morena a day   Not seeing wt management or nutritionist   Runs 5 times a week for 2-5 miles a day   Does not drink alcohol now - used to drink max of 3 drinks   Has been drunk in the past but states that it is not a habit  Occasional smokes cigars  No recreational drug use  Whole family has a hx of drug abuse       Review of Systems   Constitutional:  Negative for activity change, chills, fatigue, fever and unexpected weight change.   HENT:  Negative for ear pain, postnasal drip, rhinorrhea, sinus pressure and sore throat.    Eyes:  Negative for pain.   Respiratory:  Negative for cough, choking, chest tightness, shortness of breath and wheezing.    Cardiovascular:  Negative for chest pain, palpitations and leg swelling.   Gastrointestinal:  Negative for abdominal pain, constipation, diarrhea, nausea and vomiting.   Genitourinary:  Negative for dysuria and hematuria.   Musculoskeletal:  Negative for arthralgias, back pain, gait problem, joint swelling, myalgias and neck stiffness.   Skin:  Negative for pallor and rash.   Neurological:  Negative for dizziness, tremors, seizures, syncope, light-headedness and headaches.   Hematological:  Negative for adenopathy.   Psychiatric/Behavioral:  Positive for dysphoric mood. Negative for behavioral problems and sleep disturbance. The patient is nervous/anxious.        Objective   /62 (BP Location: Left arm,  "Patient Position: Sitting, Cuff Size: Large)   Pulse 74   Temp 98.4 °F (36.9 °C) (Temporal)   Ht 5' 11\" (1.803 m)   Wt 132 kg (290 lb) Comment: shoes off  SpO2 97%   BMI 40.45 kg/m²      Physical Exam  Constitutional:       General: He is not in acute distress.     Appearance: He is well-developed. He is not diaphoretic.   HENT:      Head: Normocephalic and atraumatic.      Right Ear: External ear normal.      Left Ear: External ear normal.      Nose: Nose normal.      Mouth/Throat:      Mouth: Mucous membranes are dry.      Pharynx: Posterior oropharyngeal erythema present. No oropharyngeal exudate.     Eyes:      General: No scleral icterus.        Right eye: No discharge.         Left eye: No discharge.      Conjunctiva/sclera: Conjunctivae normal.      Pupils: Pupils are equal, round, and reactive to light.     Neck:      Thyroid: No thyromegaly.      Vascular: No JVD.      Trachea: No tracheal deviation.     Cardiovascular:      Rate and Rhythm: Normal rate and regular rhythm.      Heart sounds: Normal heart sounds. No murmur heard.     No friction rub. No gallop.   Pulmonary:      Effort: Pulmonary effort is normal. No respiratory distress.      Breath sounds: Normal breath sounds. No wheezing or rales.   Chest:      Chest wall: No tenderness.   Abdominal:      General: Bowel sounds are normal. There is no distension.      Palpations: Abdomen is soft. There is no mass.      Tenderness: There is no abdominal tenderness. There is no guarding or rebound.     Musculoskeletal:         General: No tenderness or deformity. Normal range of motion.      Cervical back: Normal range of motion and neck supple.   Lymphadenopathy:      Cervical: No cervical adenopathy.     Skin:     General: Skin is warm and dry.      Coloration: Skin is not pale.      Findings: No erythema or rash.     Neurological:      Mental Status: He is alert and oriented to person, place, and time.      Cranial Nerves: No cranial nerve deficit. "      Motor: No abnormal muscle tone.      Coordination: Coordination normal.      Deep Tendon Reflexes: Reflexes are normal and symmetric.     Psychiatric:         Mood and Affect: Mood is anxious (Anxious affect).         Behavior: Behavior normal.

## 2025-05-14 NOTE — ASSESSMENT & PLAN NOTE
-Much improved with weight loss  - Will continue to monitor him clinically  - Continue with diet and exercise  Orders:    CBC and differential; Future    TSH, 3rd generation with Free T4 reflex; Future    Comprehensive metabolic panel; Future    Lipid panel; Future

## 2025-05-14 NOTE — PATIENT INSTRUCTIONS
PLEASE CONFIRM THAT YOU DO NOT HAVE A FAMILY HISTORY OF MEDULLARY THYROID CARCINOMA OR MULTIPLE ENDOCRINE NEOPLASIA TYPE 2     MULTIPLE ENDOCRINE NEOPLASIA TYPE 2 INCLUDES MEDULLARY THYROID CANCER, PARATHYROID HYPERPLASIA AND PHEOCHROMOCYTOMA OR MUCOSAL NEUROMAS

## 2025-05-14 NOTE — ASSESSMENT & PLAN NOTE
-Patient smokes occasional cigars  - Cessation counseling was given  - Spent about 3 minutes on smoking cessation counseling  Orders:    CBC and differential; Future    TSH, 3rd generation with Free T4 reflex; Future    Comprehensive metabolic panel; Future    Lipid panel; Future

## 2025-05-14 NOTE — ASSESSMENT & PLAN NOTE
-Patient declines a daily medication  - Will order a TSH and follow-up with the results  - Continue to follow with psychotherapy and psychiatry  - Follow-up in about 1 to 4 weeks for an annual physical exam    Orders:    CBC and differential; Future    TSH, 3rd generation with Free T4 reflex; Future    Comprehensive metabolic panel; Future    Lipid panel; Future

## 2025-05-15 NOTE — ASSESSMENT & PLAN NOTE
-With occasional panic attacks  - Patient declines a daily medication but would like a prescription for clonazepam  - I explained to the patient that because of his family history of drug abuse, it would be very unwise to place him on a benzodiazepine because this could trigger addiction and him.

## 2025-06-03 ENCOUNTER — RA CDI HCC (OUTPATIENT)
Dept: OTHER | Facility: HOSPITAL | Age: 35
End: 2025-06-03

## 2025-06-09 NOTE — TELEPHONE ENCOUNTER
I called and left patient a message on his voicemail regarding possible referral to a physician who would prescribe medical marijuana for him 
Patient calling about finding a possible referral for a doctor that would prescribe him medical marijuana  Patient said Dr Vera Harris did call and did not leave him a referral of where to call for a doctor for marijuana for his back pain? ?
Pt states that the last time he was in to see Dr Marquis Allan for his pain they discussed the possible use of Medical Marijuana  Pt states he has thought about it and would like to find out what his next steps are to get set up with the [employer] application process to help manage his back pain 
26.6

## 2025-06-25 ENCOUNTER — TELEPHONE (OUTPATIENT)
Dept: PSYCHIATRY | Facility: CLINIC | Age: 35
End: 2025-06-25